# Patient Record
Sex: FEMALE | Race: WHITE | NOT HISPANIC OR LATINO | Employment: FULL TIME | URBAN - METROPOLITAN AREA
[De-identification: names, ages, dates, MRNs, and addresses within clinical notes are randomized per-mention and may not be internally consistent; named-entity substitution may affect disease eponyms.]

---

## 2023-04-25 LAB
ESTRADIOL (PG/ML) IN SER/PLAS: 19 PG/ML
ESTRADIOL (PG/ML) IN SER/PLAS: NORMAL PG/ML
HEMATOCRIT (%) IN BLOOD BY AUTOMATED COUNT: NORMAL

## 2023-05-02 LAB
ESTRADIOL (PG/ML) IN SER/PLAS: 402 PG/ML
HEMATOCRIT (%) IN BLOOD BY AUTOMATED COUNT: 43.6 % (ref 36–46)

## 2023-05-04 LAB — ESTRADIOL (PG/ML) IN SER/PLAS: 1080 PG/ML

## 2023-05-06 LAB
ESTRADIOL (PG/ML) IN SER/PLAS: 1837 PG/ML
PROGESTERONE (NG/ML) IN SER/PLAS: 0.9 NG/ML

## 2023-05-07 LAB
ESTRADIOL (PG/ML) IN SER/PLAS: 2562 PG/ML
PROGESTERONE (NG/ML) IN SER/PLAS: 1.2 NG/ML

## 2023-05-08 LAB — CHORIOGONADOTROPIN (MIU/ML) IN SER/PLAS: 25 MIU/ML

## 2023-05-09 LAB
LUTEINIZING HORMONE (IU/ML) IN SER/PLAS: 57.9 IU/L
PROGESTERONE (NG/ML) IN SER/PLAS: 3.6 NG/ML
PROGESTERONE (NG/ML) IN SER/PLAS: 6.3 NG/ML

## 2023-06-02 ENCOUNTER — LAB (OUTPATIENT)
Dept: LAB | Facility: LAB | Age: 33
End: 2023-06-02
Payer: COMMERCIAL

## 2023-06-03 LAB — TOBACCO SCREEN, URINE: NEGATIVE

## 2023-07-05 LAB
ESTRADIOL (PG/ML) IN SER/PLAS: <19 PG/ML
HEMATOCRIT (%) IN BLOOD BY AUTOMATED COUNT: 38.7 % (ref 36–46)

## 2023-07-11 LAB — ESTRADIOL (PG/ML) IN SER/PLAS: 322 PG/ML

## 2023-07-13 LAB — ESTRADIOL (PG/ML) IN SER/PLAS: 789 PG/ML

## 2023-07-15 LAB
ESTRADIOL (PG/ML) IN SER/PLAS: 1402 PG/ML
PROGESTERONE (NG/ML) IN SER/PLAS: 0.9 NG/ML

## 2023-07-16 LAB
ESTRADIOL (PG/ML) IN SER/PLAS: 2312 PG/ML
PROGESTERONE (NG/ML) IN SER/PLAS: 1 NG/ML

## 2023-07-17 LAB
CHORIOGONADOTROPIN (MIU/ML) IN SER/PLAS: 46 MIU/ML
LUTEINIZING HORMONE (IU/ML) IN SER/PLAS: 46.2 IU/L
PROGESTERONE (NG/ML) IN SER/PLAS: 4.8 NG/ML

## 2023-08-24 LAB
ESTRADIOL (PG/ML) IN SER/PLAS: 121 PG/ML
PROGESTERONE (NG/ML) IN SER/PLAS: 0.5 NG/ML

## 2023-09-01 LAB — PROGESTERONE (NG/ML) IN SER/PLAS: 54.5 NG/ML

## 2023-09-11 LAB — CHORIOGONADOTROPIN (MIU/ML) IN SER/PLAS: 261 MIU/ML

## 2023-09-18 LAB — CHORIOGONADOTROPIN (MIU/ML) IN SER/PLAS: 3056 MIU/ML

## 2023-09-26 LAB
CHORIOGONADOTROPIN (MIU/ML) IN SER/PLAS: ABNORMAL MIU/ML
PROGESTERONE (NG/ML) IN SER/PLAS: 54.8 NG/ML

## 2023-10-06 ENCOUNTER — TELEPHONE (OUTPATIENT)
Dept: ENDOCRINOLOGY | Facility: CLINIC | Age: 33
End: 2023-10-06
Payer: COMMERCIAL

## 2023-10-06 NOTE — TELEPHONE ENCOUNTER
Returned patient's phone call. Patient aware to continue baby ASA 81mg daily and once she sees OB, they will manage from there. No further questions at this time.    MAYLIN SIDDIQUI on 10/6/23 at 1:05 PM.

## 2023-10-11 ENCOUNTER — TELEPHONE (OUTPATIENT)
Dept: MATERNAL FETAL MEDICINE | Facility: CLINIC | Age: 33
End: 2023-10-11
Payer: COMMERCIAL

## 2023-10-17 ENCOUNTER — PHARMACY VISIT (OUTPATIENT)
Dept: PHARMACY | Facility: CLINIC | Age: 33
End: 2023-10-17
Payer: COMMERCIAL

## 2023-10-17 ENCOUNTER — SPECIALTY PHARMACY (OUTPATIENT)
Dept: PHARMACY | Facility: CLINIC | Age: 33
End: 2023-10-17

## 2023-10-17 PROCEDURE — RXMED WILLOW AMBULATORY MEDICATION CHARGE

## 2023-10-19 ENCOUNTER — PHARMACY VISIT (OUTPATIENT)
Dept: PHARMACY | Facility: CLINIC | Age: 33
End: 2023-10-19
Payer: COMMERCIAL

## 2023-10-19 ENCOUNTER — SPECIALTY PHARMACY (OUTPATIENT)
Dept: PHARMACY | Facility: CLINIC | Age: 33
End: 2023-10-19

## 2023-10-19 PROBLEM — L41.0 PLEVA (PITYRIASIS LICHENOIDES ET VARIOLIFORMIS ACUTA): Status: ACTIVE | Noted: 2023-10-19

## 2023-10-19 PROBLEM — N97.9 FEMALE INFERTILITY: Status: ACTIVE | Noted: 2023-10-19

## 2023-10-19 PROBLEM — Q95.0 BALANCED AUTOSOMAL TRANSLOCATION IN NORMAL INDIVIDUAL: Status: ACTIVE | Noted: 2023-10-19

## 2023-10-19 PROCEDURE — RXMED WILLOW AMBULATORY MEDICATION CHARGE

## 2023-10-19 RX ORDER — PNV NO.95/FERROUS FUM/FOLIC AC 28MG-0.8MG
TABLET ORAL
COMMUNITY
End: 2024-04-10 | Stop reason: ALTCHOICE

## 2023-10-19 RX ORDER — NORGESTIMATE AND ETHINYL ESTRADIOL 0.25-0.035
1 KIT ORAL DAILY
COMMUNITY
End: 2024-01-16 | Stop reason: ALTCHOICE

## 2023-10-19 RX ORDER — NEEDLES, DISPOSABLE 25GX5/8"
NEEDLE, DISPOSABLE MISCELLANEOUS
COMMUNITY
End: 2024-01-16 | Stop reason: ALTCHOICE

## 2023-10-19 RX ORDER — SPIRONOLACTONE 50 MG/1
1 TABLET, FILM COATED ORAL DAILY
COMMUNITY
Start: 2018-09-11 | End: 2024-01-16 | Stop reason: ALTCHOICE

## 2023-10-19 RX ORDER — CLOMIPHENE CITRATE 50 MG/1
1 TABLET ORAL DAILY
COMMUNITY
Start: 2022-02-25 | End: 2023-10-20

## 2023-10-19 RX ORDER — ESTRADIOL 2 MG/1
3 TABLET ORAL DAILY
COMMUNITY
Start: 2023-10-04 | End: 2024-01-16 | Stop reason: ALTCHOICE

## 2023-10-20 ENCOUNTER — INITIAL PRENATAL (OUTPATIENT)
Dept: OBSTETRICS AND GYNECOLOGY | Facility: CLINIC | Age: 33
End: 2023-10-20
Payer: COMMERCIAL

## 2023-10-20 VITALS — WEIGHT: 146 LBS | DIASTOLIC BLOOD PRESSURE: 62 MMHG | SYSTOLIC BLOOD PRESSURE: 136 MMHG | BODY MASS INDEX: 22.89 KG/M2

## 2023-10-20 DIAGNOSIS — Z34.01 ENCOUNTER FOR PRENATAL CARE OF FIRST PREGNANCY, FIRST TRIMESTER (HHS-HCC): Primary | ICD-10-CM

## 2023-10-20 DIAGNOSIS — Z34.01 ENCOUNTER FOR SUPERVISION OF NORMAL FIRST PREGNANCY IN FIRST TRIMESTER (HHS-HCC): ICD-10-CM

## 2023-10-20 DIAGNOSIS — Z31.83 IN VITRO FERTILIZATION: ICD-10-CM

## 2023-10-20 PROBLEM — L41.0 PLEVA (PITYRIASIS LICHENOIDES ET VARIOLIFORMIS ACUTA): Status: RESOLVED | Noted: 2023-10-19 | Resolved: 2023-10-20

## 2023-10-20 PROCEDURE — 87086 URINE CULTURE/COLONY COUNT: CPT

## 2023-10-20 PROCEDURE — 90471 IMMUNIZATION ADMIN: CPT | Performed by: OBSTETRICS & GYNECOLOGY

## 2023-10-20 PROCEDURE — 90686 IIV4 VACC NO PRSV 0.5 ML IM: CPT | Performed by: OBSTETRICS & GYNECOLOGY

## 2023-10-20 PROCEDURE — 0500F INITIAL PRENATAL CARE VISIT: CPT | Performed by: OBSTETRICS & GYNECOLOGY

## 2023-10-20 NOTE — PROGRESS NOTES
Subjective   Patient ID [id number]   Emelyn Perez is a 32 y.o.  at 9w5d with a working estimated date of delivery of 2024, by Ultrasound who presents for an initial prenatal visit. This pregnancy is planned and conceived via IVF for a balanced translocation.     Pob: three very early ?chemical pregnancies at 4 wks  PMH: none  PSH: none  Meds: estrogen/progesterone (stop at 11 wks), prenatal vitamin, Vit D and ASA 81 mg     Her pregnancy is complicated by:  none    OB History    Para Term  AB Living   1             SAB IAB Ectopic Multiple Live Births                  # Outcome Date GA Lbr Chevy/2nd Weight Sex Delivery Anes PTL Lv   1 Current                   Objective   Physical Exam  Weight: 66.2 kg (146 lb)  Expected Total Weight Gain: Could not be calculated   Pregravid BMI: Could not be calculated  BP: 136/62         Physical Exam  Constitutional:       Appearance: Normal appearance. She is normal weight.   Abdominal:      Comments: US: single intrauterine pregnancy with +FCA c/w prior scans   Neurological:      Mental Status: She is alert.         Assessment/Plan   33 y/o  at 9.5 wks by IVF presenting for new ob visit.    Routine prenatal care: plan OB labs n/v, not a candidate for ASA, will stop her ASA 81 mg now.  Immunizations: flu shot given today, COVID booster recommended  Genetics: embyro PGT tested, discussed if she would like genetic screening, NIPS is still recommended.   had negative carrier screening, patient herself did not have carrier screening  IVF pregnancy: discussed serial growth, NSTs at 36 wks and delivery by 39.6 wks.    Follow up in 4 weeks for return OB visit.

## 2023-10-22 LAB — BACTERIA UR CULT: NO GROWTH

## 2023-10-27 ENCOUNTER — LAB (OUTPATIENT)
Dept: LAB | Facility: LAB | Age: 33
End: 2023-10-27
Payer: COMMERCIAL

## 2023-10-27 DIAGNOSIS — Z34.01 ENCOUNTER FOR PRENATAL CARE OF FIRST PREGNANCY, FIRST TRIMESTER (HHS-HCC): ICD-10-CM

## 2023-10-27 LAB
ABO GROUP (TYPE) IN BLOOD: NORMAL
ANTIBODY SCREEN: NORMAL
ERYTHROCYTE [DISTWIDTH] IN BLOOD BY AUTOMATED COUNT: 12.7 % (ref 11.5–14.5)
HBV SURFACE AG SERPL QL IA: NONREACTIVE
HCT VFR BLD AUTO: 38.7 % (ref 36–46)
HCV AB SER QL: NONREACTIVE
HGB BLD-MCNC: 12.7 G/DL (ref 12–16)
HIV 1+2 AB+HIV1 P24 AG SERPL QL IA: NONREACTIVE
MCH RBC QN AUTO: 29.2 PG (ref 26–34)
MCHC RBC AUTO-ENTMCNC: 32.8 G/DL (ref 32–36)
MCV RBC AUTO: 89 FL (ref 80–100)
NRBC BLD-RTO: 0 /100 WBCS (ref 0–0)
PLATELET # BLD AUTO: 344 X10*3/UL (ref 150–450)
PMV BLD AUTO: 10.9 FL (ref 7.5–11.5)
RBC # BLD AUTO: 4.35 X10*6/UL (ref 4–5.2)
RH FACTOR (ANTIGEN D): NORMAL
WBC # BLD AUTO: 15.4 X10*3/UL (ref 4.4–11.3)

## 2023-10-27 PROCEDURE — 36415 COLL VENOUS BLD VENIPUNCTURE: CPT

## 2023-10-27 PROCEDURE — 86900 BLOOD TYPING SEROLOGIC ABO: CPT

## 2023-10-27 PROCEDURE — 87340 HEPATITIS B SURFACE AG IA: CPT

## 2023-10-27 PROCEDURE — 85027 COMPLETE CBC AUTOMATED: CPT

## 2023-10-27 PROCEDURE — 86780 TREPONEMA PALLIDUM: CPT

## 2023-10-27 PROCEDURE — 86901 BLOOD TYPING SEROLOGIC RH(D): CPT

## 2023-10-27 PROCEDURE — 86850 RBC ANTIBODY SCREEN: CPT

## 2023-10-27 PROCEDURE — 86317 IMMUNOASSAY INFECTIOUS AGENT: CPT

## 2023-10-27 PROCEDURE — 86803 HEPATITIS C AB TEST: CPT

## 2023-10-27 PROCEDURE — 87389 HIV-1 AG W/HIV-1&-2 AB AG IA: CPT

## 2023-10-28 LAB
RUBV IGG SERPL IA-ACNC: 3 IA
RUBV IGG SERPL QL IA: POSITIVE
T PALLIDUM AB SER QL: NONREACTIVE

## 2023-11-03 ENCOUNTER — TELEPHONE (OUTPATIENT)
Dept: OBSTETRICS AND GYNECOLOGY | Facility: CLINIC | Age: 33
End: 2023-11-03

## 2023-11-03 DIAGNOSIS — O21.9 NAUSEA AND VOMITING IN PREGNANCY PRIOR TO 22 WEEKS GESTATION (HHS-HCC): Primary | ICD-10-CM

## 2023-11-03 DIAGNOSIS — N97.9 FEMALE INFERTILITY, UNSPECIFIED: ICD-10-CM

## 2023-11-03 RX ORDER — ONDANSETRON 4 MG/1
4 TABLET, FILM COATED ORAL EVERY 12 HOURS PRN
Qty: 14 TABLET | Refills: 0 | Status: SHIPPED | OUTPATIENT
Start: 2023-11-03 | End: 2023-11-10

## 2023-11-03 RX ORDER — ESTRADIOL 2 MG/1
6 TABLET ORAL DAILY
Qty: 90 TABLET | Refills: 2 | OUTPATIENT
Start: 2023-11-03

## 2023-11-03 NOTE — TELEPHONE ENCOUNTER
Anjel Mandujano, can you please check that this patient needs this script. She has been seen for OB care and should have stopped her Estrace at the end of October, TYShanna :)

## 2023-11-03 NOTE — TELEPHONE ENCOUNTER
Called patient to make sure she stopped taking her estrace last week, as Shanna Warner received Rx request for it. Patient confirmed she stopped taking her estrace at 10w6d, and did not put in the request.    MAYLIN SIDDIQUI on 11/3/23 at 9:11 AM.

## 2023-11-03 NOTE — TELEPHONE ENCOUNTER
11.5 wk ob called OB line c/o horrible nausea, which has recently started.  Patients coworkers, who are also pregnant, advised her to call to see if she can have a prescription for Zofran.  Patient also has a headache today.  Patient has not had any vomiting yet but her nausea is becoming very bothersome.    Patient advised ok to take tylenol in pregnancy, per box instructions for her headache.  Patient advised to try over the counter options below.    *Ginger capsules up to 4 times a day  *Unisom 1 tab/Vitamin B6 1 tab at bedtime, 1 vitamin B6 in the morning and 1 vitamin B6 in the afternoon  * Prego pops, ginger snaps, ginger candies, ginger tea, lemon drop candies, lemon water, pregnancy bands  *Switching to a gummy prenatal vitamin for the next few weeks may help with nausea  *Eating small meals throughout the day can help as well.    Patient encouraged to try to push fluids to prevent dehydration.  Try to drink at least 2-3 liters of fluids a day.  Propel, Gatorade, PowerAde, Pedialyte, Liquid IV, ginger ale or even sprite.  Eating bland foods or broth for the 1st few weeks may help until her nausea can get under control.  Try the over the counter options for at least 4-5 days and call if not helping.      Patient was assured that I will send in a message to the provider to see if something can be sent to your pharmacy as well.      Please advise on Zofran request

## 2023-11-14 ENCOUNTER — APPOINTMENT (OUTPATIENT)
Dept: RADIOLOGY | Facility: CLINIC | Age: 33
End: 2023-11-14
Payer: COMMERCIAL

## 2023-11-16 ENCOUNTER — ANCILLARY PROCEDURE (OUTPATIENT)
Dept: RADIOLOGY | Facility: CLINIC | Age: 33
End: 2023-11-16
Payer: COMMERCIAL

## 2023-11-16 DIAGNOSIS — Z34.01 ENCOUNTER FOR PRENATAL CARE OF FIRST PREGNANCY, FIRST TRIMESTER (HHS-HCC): ICD-10-CM

## 2023-11-16 PROCEDURE — 76801 OB US < 14 WKS SINGLE FETUS: CPT | Performed by: OBSTETRICS & GYNECOLOGY

## 2023-11-16 PROCEDURE — 76801 OB US < 14 WKS SINGLE FETUS: CPT

## 2023-11-17 ENCOUNTER — ROUTINE PRENATAL (OUTPATIENT)
Dept: OBSTETRICS AND GYNECOLOGY | Facility: CLINIC | Age: 33
End: 2023-11-17
Payer: COMMERCIAL

## 2023-11-17 VITALS — DIASTOLIC BLOOD PRESSURE: 80 MMHG | BODY MASS INDEX: 23.91 KG/M2 | WEIGHT: 152.5 LBS | SYSTOLIC BLOOD PRESSURE: 126 MMHG

## 2023-11-17 DIAGNOSIS — Z34.01 ENCOUNTER FOR SUPERVISION OF NORMAL FIRST PREGNANCY IN FIRST TRIMESTER (HHS-HCC): ICD-10-CM

## 2023-11-17 DIAGNOSIS — Z3A.13 13 WEEKS GESTATION OF PREGNANCY (HHS-HCC): Primary | ICD-10-CM

## 2023-11-17 PROCEDURE — 88175 CYTOPATH C/V AUTO FLUID REDO: CPT

## 2023-11-17 PROCEDURE — 88141 CYTOPATH C/V INTERPRET: CPT | Performed by: PATHOLOGY

## 2023-11-17 PROCEDURE — 87800 DETECT AGNT MULT DNA DIREC: CPT

## 2023-11-17 PROCEDURE — 87624 HPV HI-RISK TYP POOLED RSLT: CPT

## 2023-11-17 PROCEDURE — 0501F PRENATAL FLOW SHEET: CPT | Performed by: OBSTETRICS & GYNECOLOGY

## 2023-11-17 NOTE — PROGRESS NOTES
Routine ob at 13.5 wks.  Feeling okay, having some nausea but manageable.  Unable to measure NT but did not appear abnormal.  Normal ob labs.  Declining NIPS.  Normal physical exam today, pap and gc/ct done.  RTC 4 wks.

## 2023-11-18 LAB
C TRACH RRNA SPEC QL NAA+PROBE: NEGATIVE
N GONORRHOEA DNA SPEC QL PROBE+SIG AMP: NEGATIVE

## 2023-12-08 ENCOUNTER — TELEPHONE (OUTPATIENT)
Dept: OBSTETRICS AND GYNECOLOGY | Facility: CLINIC | Age: 33
End: 2023-12-08
Payer: COMMERCIAL

## 2023-12-08 LAB

## 2023-12-12 NOTE — TELEPHONE ENCOUNTER
----- Message from Radha Abad MD sent at 12/8/2023  5:29 PM EST -----  Pap asc-us, hpv pos, needs colposcopy    ----- Message -----  From: Lab, Background User  Sent: 11/18/2023   1:16 PM EST  To: Radha Abad MD

## 2023-12-18 ENCOUNTER — ROUTINE PRENATAL (OUTPATIENT)
Dept: OBSTETRICS AND GYNECOLOGY | Facility: CLINIC | Age: 33
End: 2023-12-18
Payer: COMMERCIAL

## 2023-12-18 VITALS — BODY MASS INDEX: 24.93 KG/M2 | WEIGHT: 159 LBS | SYSTOLIC BLOOD PRESSURE: 122 MMHG | DIASTOLIC BLOOD PRESSURE: 82 MMHG

## 2023-12-18 DIAGNOSIS — R87.620 ATYPICAL SQUAMOUS CELL CHANGES OF UNDETERMINED SIGNIFICANCE (ASCUS) ON VAGINAL CYTOLOGY WITH POSITIVE HIGH RISK HUMAN PAPILLOMA VIRUS (HPV): ICD-10-CM

## 2023-12-18 DIAGNOSIS — Z3A.18 18 WEEKS GESTATION OF PREGNANCY (HHS-HCC): Primary | ICD-10-CM

## 2023-12-18 DIAGNOSIS — R87.811 ATYPICAL SQUAMOUS CELL CHANGES OF UNDETERMINED SIGNIFICANCE (ASCUS) ON VAGINAL CYTOLOGY WITH POSITIVE HIGH RISK HUMAN PAPILLOMA VIRUS (HPV): ICD-10-CM

## 2023-12-18 PROCEDURE — 0501F PRENATAL FLOW SHEET: CPT | Performed by: OBSTETRICS & GYNECOLOGY

## 2023-12-18 PROCEDURE — 57452 EXAM OF CERVIX W/SCOPE: CPT | Performed by: OBSTETRICS & GYNECOLOGY

## 2023-12-18 NOTE — PROGRESS NOTES
Routine ob at 18.1 wks.  Feeling well.  Ob colpo today for ASC-US, hpv pos (other) pap.  Has anatomy scan scheduled.  RTC 4 wks.

## 2023-12-18 NOTE — PROGRESS NOTES
Patient ID: Emelyn Perez is a 33 y.o. female.    Colposcopy    Date/Time: 12/18/2023 12:44 PM    Performed by: Radha Abad MD  Authorized by: Radha Abad MD    Consent:     Patient questions answered: yes      Risks and benefits of the procedure and its alternatives discussed: yes      Consent obtained:  Written  Procedure:     Colposcopy with: colposcopy only      Cervix visibility: fully visualized      SCJ visibility: fully visualized      Lesion visualized: fully visualized      Acetowhite lesion(s): cervix    Post-procedure:     Patient tolerance of procedure:  Patient tolerated the procedure well with no immediate complications  Comments:      Ob colpo for ASC-US, hpv positive (other) pap.  Impression CIN1.  No biopsies taken.  Plan postpartum repeat colpo.    Radha Abad MD

## 2023-12-26 ENCOUNTER — ANCILLARY PROCEDURE (OUTPATIENT)
Dept: RADIOLOGY | Facility: CLINIC | Age: 33
End: 2023-12-26
Payer: COMMERCIAL

## 2023-12-26 DIAGNOSIS — Z34.90 ENCOUNTER FOR SUPERVISION OF NORMAL PREGNANCY, UNSPECIFIED, UNSPECIFIED TRIMESTER (HHS-HCC): ICD-10-CM

## 2023-12-26 DIAGNOSIS — O09.819 HIGH RISK PREGNANCY DUE TO ASSISTED REPRODUCTIVE TECHNOLOGY (HHS-HCC): ICD-10-CM

## 2023-12-26 PROCEDURE — 76817 TRANSVAGINAL US OBSTETRIC: CPT | Performed by: OBSTETRICS & GYNECOLOGY

## 2023-12-26 PROCEDURE — 76811 OB US DETAILED SNGL FETUS: CPT | Performed by: OBSTETRICS & GYNECOLOGY

## 2023-12-26 PROCEDURE — 76820 UMBILICAL ARTERY ECHO: CPT | Performed by: OBSTETRICS & GYNECOLOGY

## 2023-12-26 PROCEDURE — 76817 TRANSVAGINAL US OBSTETRIC: CPT

## 2023-12-26 PROCEDURE — 76811 OB US DETAILED SNGL FETUS: CPT

## 2024-01-02 ENCOUNTER — TELEPHONE (OUTPATIENT)
Dept: OBSTETRICS AND GYNECOLOGY | Facility: CLINIC | Age: 34
End: 2024-01-02

## 2024-01-02 NOTE — TELEPHONE ENCOUNTER
20.2 wk ob left message on ob line c/o horrible sinus pressure and is wondering what she can take for relief.    Left message for patient to call back to discuss symptoms.    Following message sent through [employer]:     Anjel Dunaway,    Sorry to hear your not feeling well.  Attached is our list of safe over the counter medication that can be taken in pregnancy to refer to.    You can take the following based of your symptoms:        Mucines- for congestion; per box instructions  Sudafed or pseudoephedrine- for sinus pressure/headache if you do not have issues with high blood pressure; per box instructions.  If you do have high blood pressure, then should avoid Sudafed or pseudoephedrine.  You can try Afrin nasal spray, per box instructions; Actifed or Claritin D, per box instructions.    Tylenol- for headache, body aches, chills; per box instructions.     Make sure you are staying well hydrated, drinking at least 2-3 liters of water a day and getting plenty of rest.  If you start feeling worse, than she would need to get in with either PCP or Urgent Care for evaluation.  If needed, antibiotics are safe in pregnancy      Please call with any other questions or concerns.    -Ludmila  OB navigator

## 2024-01-12 ENCOUNTER — ANCILLARY PROCEDURE (OUTPATIENT)
Dept: RADIOLOGY | Facility: CLINIC | Age: 34
End: 2024-01-12
Payer: COMMERCIAL

## 2024-01-12 DIAGNOSIS — Z34.90 ENCOUNTER FOR SUPERVISION OF NORMAL PREGNANCY, UNSPECIFIED, UNSPECIFIED TRIMESTER (HHS-HCC): ICD-10-CM

## 2024-01-12 PROCEDURE — 76816 OB US FOLLOW-UP PER FETUS: CPT

## 2024-01-12 PROCEDURE — 76816 OB US FOLLOW-UP PER FETUS: CPT | Performed by: OBSTETRICS & GYNECOLOGY

## 2024-01-16 ENCOUNTER — ROUTINE PRENATAL (OUTPATIENT)
Dept: OBSTETRICS AND GYNECOLOGY | Facility: CLINIC | Age: 34
End: 2024-01-16
Payer: COMMERCIAL

## 2024-01-16 VITALS — BODY MASS INDEX: 26.65 KG/M2 | WEIGHT: 170 LBS | SYSTOLIC BLOOD PRESSURE: 124 MMHG | DIASTOLIC BLOOD PRESSURE: 84 MMHG

## 2024-01-16 DIAGNOSIS — Z3A.22 22 WEEKS GESTATION OF PREGNANCY (HHS-HCC): Primary | ICD-10-CM

## 2024-01-16 DIAGNOSIS — Z34.01 ENCOUNTER FOR SUPERVISION OF NORMAL FIRST PREGNANCY IN FIRST TRIMESTER (HHS-HCC): ICD-10-CM

## 2024-01-16 PROBLEM — O43.199 MARGINAL INSERTION OF UMBILICAL CORD AFFECTING MANAGEMENT OF MOTHER (HHS-HCC): Status: ACTIVE | Noted: 2024-01-16

## 2024-01-16 PROCEDURE — 59425 ANTEPARTUM CARE ONLY: CPT | Performed by: OBSTETRICS & GYNECOLOGY

## 2024-01-16 NOTE — PROGRESS NOTES
Routine ob at 22.2 wks.  Feeling well.  Not yet feeling fetal movement.  Anatomy scan sig for marginal cord insertion and EFW 10% at anatomy scan.  Had follow up scan on Friday but report is pending, has next scan at 28 wks.  RTC 4 wks, 1 hour then.

## 2024-01-26 ENCOUNTER — TELEPHONE (OUTPATIENT)
Dept: OBSTETRICS AND GYNECOLOGY | Facility: CLINIC | Age: 34
End: 2024-01-26
Payer: COMMERCIAL

## 2024-01-26 ENCOUNTER — HOSPITAL ENCOUNTER (INPATIENT)
Facility: HOSPITAL | Age: 34
LOS: 26 days | Discharge: HOME | End: 2024-02-21
Attending: OBSTETRICS & GYNECOLOGY | Admitting: OBSTETRICS & GYNECOLOGY
Payer: COMMERCIAL

## 2024-01-26 DIAGNOSIS — Z31.83 IN VITRO FERTILIZATION: ICD-10-CM

## 2024-01-26 DIAGNOSIS — O16.9: ICD-10-CM

## 2024-01-26 DIAGNOSIS — O36.5921 POOR FETAL GROWTH AFFECTING MANAGEMENT OF MOTHER IN SECOND TRIMESTER, FETUS 1 OF MULTIPLE GESTATION (HHS-HCC): Primary | ICD-10-CM

## 2024-01-26 DIAGNOSIS — Q95.0 BALANCED AUTOSOMAL TRANSLOCATION IN NORMAL INDIVIDUAL: ICD-10-CM

## 2024-01-26 DIAGNOSIS — O36.5920: ICD-10-CM

## 2024-01-26 DIAGNOSIS — O14.12: ICD-10-CM

## 2024-01-26 DIAGNOSIS — O43.199 MARGINAL INSERTION OF UMBILICAL CORD AFFECTING MANAGEMENT OF MOTHER (HHS-HCC): ICD-10-CM

## 2024-01-26 LAB
ALBUMIN SERPL BCP-MCNC: 3.6 G/DL (ref 3.4–5)
ALBUMIN SERPL BCP-MCNC: 3.9 G/DL (ref 3.4–5)
ALP SERPL-CCNC: 43 U/L (ref 33–110)
ALP SERPL-CCNC: 49 U/L (ref 33–110)
ALT SERPL W P-5'-P-CCNC: 22 U/L (ref 7–45)
ALT SERPL W P-5'-P-CCNC: 23 U/L (ref 7–45)
ANION GAP SERPL CALC-SCNC: 12 MMOL/L (ref 10–20)
ANION GAP SERPL CALC-SCNC: 16 MMOL/L (ref 10–20)
AST SERPL W P-5'-P-CCNC: 20 U/L (ref 9–39)
AST SERPL W P-5'-P-CCNC: 21 U/L (ref 9–39)
BILIRUB SERPL-MCNC: 0.3 MG/DL (ref 0–1.2)
BILIRUB SERPL-MCNC: 0.3 MG/DL (ref 0–1.2)
BUN SERPL-MCNC: 14 MG/DL (ref 6–23)
BUN SERPL-MCNC: 15 MG/DL (ref 6–23)
CALCIUM SERPL-MCNC: 8 MG/DL (ref 8.6–10.6)
CALCIUM SERPL-MCNC: 9 MG/DL (ref 8.6–10.3)
CHLORIDE SERPL-SCNC: 102 MMOL/L (ref 98–107)
CHLORIDE SERPL-SCNC: 107 MMOL/L (ref 98–107)
CO2 SERPL-SCNC: 19 MMOL/L (ref 21–32)
CO2 SERPL-SCNC: 22 MMOL/L (ref 21–32)
CREAT SERPL-MCNC: 0.55 MG/DL (ref 0.5–1.05)
CREAT SERPL-MCNC: 0.59 MG/DL (ref 0.5–1.05)
CREAT UR-MCNC: 12.6 MG/DL (ref 20–320)
EGFRCR SERPLBLD CKD-EPI 2021: >90 ML/MIN/1.73M*2
EGFRCR SERPLBLD CKD-EPI 2021: >90 ML/MIN/1.73M*2
ERYTHROCYTE [DISTWIDTH] IN BLOOD BY AUTOMATED COUNT: 13.4 % (ref 11.5–14.5)
ERYTHROCYTE [DISTWIDTH] IN BLOOD BY AUTOMATED COUNT: 13.4 % (ref 11.5–14.5)
GLUCOSE SERPL-MCNC: 120 MG/DL (ref 74–99)
GLUCOSE SERPL-MCNC: 98 MG/DL (ref 74–99)
HCT VFR BLD AUTO: 38.3 % (ref 36–46)
HCT VFR BLD AUTO: 38.7 % (ref 36–46)
HGB BLD-MCNC: 12.7 G/DL (ref 12–16)
HGB BLD-MCNC: 12.9 G/DL (ref 12–16)
HOLD SPECIMEN: NORMAL
HOLD SPECIMEN: NORMAL
MCH RBC QN AUTO: 29.3 PG (ref 26–34)
MCH RBC QN AUTO: 29.4 PG (ref 26–34)
MCHC RBC AUTO-ENTMCNC: 33.2 G/DL (ref 32–36)
MCHC RBC AUTO-ENTMCNC: 33.3 G/DL (ref 32–36)
MCV RBC AUTO: 88 FL (ref 80–100)
MCV RBC AUTO: 88 FL (ref 80–100)
NRBC BLD-RTO: 0 /100 WBCS (ref 0–0)
NRBC BLD-RTO: 0.1 /100 WBCS (ref 0–0)
PLATELET # BLD AUTO: 255 X10*3/UL (ref 150–450)
PLATELET # BLD AUTO: 266 X10*3/UL (ref 150–450)
POTASSIUM SERPL-SCNC: 3.8 MMOL/L (ref 3.5–5.3)
POTASSIUM SERPL-SCNC: 4 MMOL/L (ref 3.5–5.3)
PROT SERPL-MCNC: 6.2 G/DL (ref 6.4–8.2)
PROT SERPL-MCNC: 6.3 G/DL (ref 6.4–8.2)
PROT UR-ACNC: 18 MG/DL (ref 5–24)
PROT/CREAT UR: 1.43 MG/MG CREAT (ref 0–0.17)
RBC # BLD AUTO: 4.34 X10*6/UL (ref 4–5.2)
RBC # BLD AUTO: 4.39 X10*6/UL (ref 4–5.2)
SODIUM SERPL-SCNC: 133 MMOL/L (ref 136–145)
SODIUM SERPL-SCNC: 137 MMOL/L (ref 136–145)
TREPONEMA PALLIDUM IGG+IGM AB [PRESENCE] IN SERUM OR PLASMA BY IMMUNOASSAY: NONREACTIVE
URATE SERPL-MCNC: 3.8 MG/DL (ref 2.3–6.7)
WBC # BLD AUTO: 16.1 X10*3/UL (ref 4.4–11.3)
WBC # BLD AUTO: 19.6 X10*3/UL (ref 4.4–11.3)

## 2024-01-26 PROCEDURE — 36415 COLL VENOUS BLD VENIPUNCTURE: CPT | Performed by: OBSTETRICS & GYNECOLOGY

## 2024-01-26 PROCEDURE — 51702 INSERT TEMP BLADDER CATH: CPT

## 2024-01-26 PROCEDURE — 85027 COMPLETE CBC AUTOMATED: CPT

## 2024-01-26 PROCEDURE — 84075 ASSAY ALKALINE PHOSPHATASE: CPT

## 2024-01-26 PROCEDURE — 36415 COLL VENOUS BLD VENIPUNCTURE: CPT

## 2024-01-26 PROCEDURE — 2500000004 HC RX 250 GENERAL PHARMACY W/ HCPCS (ALT 636 FOR OP/ED)

## 2024-01-26 PROCEDURE — 84075 ASSAY ALKALINE PHOSPHATASE: CPT | Performed by: OBSTETRICS & GYNECOLOGY

## 2024-01-26 PROCEDURE — 2500000004 HC RX 250 GENERAL PHARMACY W/ HCPCS (ALT 636 FOR OP/ED): Performed by: OBSTETRICS & GYNECOLOGY

## 2024-01-26 PROCEDURE — 85027 COMPLETE CBC AUTOMATED: CPT | Performed by: OBSTETRICS & GYNECOLOGY

## 2024-01-26 PROCEDURE — 99223 1ST HOSP IP/OBS HIGH 75: CPT

## 2024-01-26 PROCEDURE — 2500000001 HC RX 250 WO HCPCS SELF ADMINISTERED DRUGS (ALT 637 FOR MEDICARE OP)

## 2024-01-26 PROCEDURE — 99199 UNLISTED SPECIAL SVC PX/RPRT: CPT

## 2024-01-26 PROCEDURE — 1220000001 HC OB SEMI-PRIVATE ROOM DAILY

## 2024-01-26 PROCEDURE — 82570 ASSAY OF URINE CREATININE: CPT | Performed by: OBSTETRICS & GYNECOLOGY

## 2024-01-26 PROCEDURE — 99215 OFFICE O/P EST HI 40 MIN: CPT | Mod: 25,27

## 2024-01-26 PROCEDURE — 84550 ASSAY OF BLOOD/URIC ACID: CPT | Performed by: OBSTETRICS & GYNECOLOGY

## 2024-01-26 PROCEDURE — 86780 TREPONEMA PALLIDUM: CPT

## 2024-01-26 RX ORDER — CALCIUM GLUCONATE 98 MG/ML
1 INJECTION, SOLUTION INTRAVENOUS ONCE AS NEEDED
Status: DISCONTINUED | OUTPATIENT
Start: 2024-01-26 | End: 2024-01-26

## 2024-01-26 RX ORDER — LABETALOL HYDROCHLORIDE 5 MG/ML
20 INJECTION, SOLUTION INTRAVENOUS ONCE
Status: DISCONTINUED | OUTPATIENT
Start: 2024-01-26 | End: 2024-01-27

## 2024-01-26 RX ORDER — NIFEDIPINE 30 MG/1
30 TABLET, FILM COATED, EXTENDED RELEASE ORAL ONCE
Status: COMPLETED | OUTPATIENT
Start: 2024-01-26 | End: 2024-01-26

## 2024-01-26 RX ORDER — HYDRALAZINE HYDROCHLORIDE 20 MG/ML
5 INJECTION INTRAMUSCULAR; INTRAVENOUS ONCE AS NEEDED
Status: DISCONTINUED | OUTPATIENT
Start: 2024-01-26 | End: 2024-01-27

## 2024-01-26 RX ORDER — ACETAMINOPHEN 325 MG/1
975 TABLET ORAL ONCE
Status: COMPLETED | OUTPATIENT
Start: 2024-01-26 | End: 2024-01-26

## 2024-01-26 RX ORDER — LABETALOL HYDROCHLORIDE 5 MG/ML
INJECTION, SOLUTION INTRAVENOUS
Status: COMPLETED
Start: 2024-01-26 | End: 2024-01-26

## 2024-01-26 RX ORDER — ONDANSETRON 4 MG/1
4 TABLET, FILM COATED ORAL EVERY 6 HOURS PRN
Status: DISCONTINUED | OUTPATIENT
Start: 2024-01-26 | End: 2024-01-27

## 2024-01-26 RX ORDER — NIFEDIPINE 10 MG/1
10 CAPSULE ORAL ONCE AS NEEDED
Status: DISCONTINUED | OUTPATIENT
Start: 2024-01-26 | End: 2024-01-27 | Stop reason: DRUGHIGH

## 2024-01-26 RX ORDER — CALCIUM GLUCONATE 98 MG/ML
1 INJECTION, SOLUTION INTRAVENOUS ONCE AS NEEDED
Status: DISCONTINUED | OUTPATIENT
Start: 2024-01-26 | End: 2024-01-29 | Stop reason: ALTCHOICE

## 2024-01-26 RX ORDER — NIFEDIPINE 30 MG/1
30 TABLET, FILM COATED, EXTENDED RELEASE ORAL
Status: COMPLETED | OUTPATIENT
Start: 2024-01-26 | End: 2024-01-26

## 2024-01-26 RX ORDER — METOCLOPRAMIDE 10 MG/1
10 TABLET ORAL ONCE
Status: COMPLETED | OUTPATIENT
Start: 2024-01-26 | End: 2024-01-26

## 2024-01-26 RX ORDER — LABETALOL HYDROCHLORIDE 5 MG/ML
20 INJECTION, SOLUTION INTRAVENOUS ONCE AS NEEDED
Status: COMPLETED | OUTPATIENT
Start: 2024-01-26 | End: 2024-01-26

## 2024-01-26 RX ORDER — LABETALOL HYDROCHLORIDE 5 MG/ML
40 INJECTION, SOLUTION INTRAVENOUS ONCE
Status: COMPLETED | OUTPATIENT
Start: 2024-01-26 | End: 2024-01-26

## 2024-01-26 RX ORDER — BETAMETHASONE SODIUM PHOSPHATE AND BETAMETHASONE ACETATE 3; 3 MG/ML; MG/ML
12 INJECTION, SUSPENSION INTRA-ARTICULAR; INTRALESIONAL; INTRAMUSCULAR; SOFT TISSUE ONCE
Status: COMPLETED | OUTPATIENT
Start: 2024-01-26 | End: 2024-01-26

## 2024-01-26 RX ORDER — MAGNESIUM SULFATE HEPTAHYDRATE 40 MG/ML
2 INJECTION, SOLUTION INTRAVENOUS CONTINUOUS
Status: DISCONTINUED | OUTPATIENT
Start: 2024-01-26 | End: 2024-01-29 | Stop reason: ALTCHOICE

## 2024-01-26 RX ORDER — MAGNESIUM SULFATE HEPTAHYDRATE 40 MG/ML
2 INJECTION, SOLUTION INTRAVENOUS CONTINUOUS
Status: DISCONTINUED | OUTPATIENT
Start: 2024-01-26 | End: 2024-01-26

## 2024-01-26 RX ORDER — ONDANSETRON HYDROCHLORIDE 2 MG/ML
4 INJECTION, SOLUTION INTRAVENOUS EVERY 6 HOURS PRN
Status: DISCONTINUED | OUTPATIENT
Start: 2024-01-26 | End: 2024-01-27

## 2024-01-26 RX ORDER — DIPHENHYDRAMINE HCL 25 MG
25 CAPSULE ORAL ONCE
Status: COMPLETED | OUTPATIENT
Start: 2024-01-26 | End: 2024-01-26

## 2024-01-26 RX ORDER — LIDOCAINE HYDROCHLORIDE 10 MG/ML
0.5 INJECTION INFILTRATION; PERINEURAL ONCE AS NEEDED
Status: DISCONTINUED | OUTPATIENT
Start: 2024-01-26 | End: 2024-01-27

## 2024-01-26 RX ORDER — SODIUM CHLORIDE, SODIUM LACTATE, POTASSIUM CHLORIDE, CALCIUM CHLORIDE 600; 310; 30; 20 MG/100ML; MG/100ML; MG/100ML; MG/100ML
75 INJECTION, SOLUTION INTRAVENOUS CONTINUOUS
Status: DISCONTINUED | OUTPATIENT
Start: 2024-01-26 | End: 2024-01-27

## 2024-01-26 RX ADMIN — ACETAMINOPHEN 975 MG: 325 TABLET ORAL at 23:46

## 2024-01-26 RX ADMIN — NIFEDIPINE 30 MG: 30 TABLET, FILM COATED, EXTENDED RELEASE ORAL at 15:26

## 2024-01-26 RX ADMIN — BETAMETHASONE SODIUM PHOSPHATE AND BETAMETHASONE ACETATE 12 MG: 3; 3 INJECTION, SUSPENSION INTRA-ARTICULAR; INTRALESIONAL; INTRAMUSCULAR at 14:45

## 2024-01-26 RX ADMIN — MAGNESIUM SULFATE IN WATER 2 G/HR: 20 INJECTION, SOLUTION INTRAVENOUS at 21:10

## 2024-01-26 RX ADMIN — LABETALOL HYDROCHLORIDE 40 MG: 5 INJECTION, SOLUTION INTRAVENOUS at 15:05

## 2024-01-26 RX ADMIN — DIPHENHYDRAMINE HYDROCHLORIDE 25 MG: 25 CAPSULE ORAL at 23:46

## 2024-01-26 RX ADMIN — SODIUM CHLORIDE, POTASSIUM CHLORIDE, SODIUM LACTATE AND CALCIUM CHLORIDE 75 ML/HR: 600; 310; 30; 20 INJECTION, SOLUTION INTRAVENOUS at 15:08

## 2024-01-26 RX ADMIN — LABETALOL HYDROCHLORIDE 20 MG: 5 INJECTION, SOLUTION INTRAVENOUS at 14:17

## 2024-01-26 RX ADMIN — NIFEDIPINE 30 MG: 30 TABLET, FILM COATED, EXTENDED RELEASE ORAL at 21:10

## 2024-01-26 RX ADMIN — MAGNESIUM SULFATE HEPTAHYDRATE 2 G/HR: 40 INJECTION, SOLUTION INTRAVENOUS at 14:29

## 2024-01-26 RX ADMIN — METOCLOPRAMIDE 10 MG: 10 TABLET ORAL at 23:46

## 2024-01-26 RX ADMIN — MAGNESIUM SULFATE HEPTAHYDRATE 2 G/HR: 40 INJECTION, SOLUTION INTRAVENOUS at 14:52

## 2024-01-26 RX ADMIN — LABETALOL HYDROCHLORIDE 40 MG: 5 INJECTION, SOLUTION INTRAVENOUS at 16:25

## 2024-01-26 ASSESSMENT — PAIN SCALES - GENERAL
PAINLEVEL_OUTOF10: 0 - NO PAIN
PAINLEVEL_OUTOF10: 0 - NO PAIN
PAINLEVEL_OUTOF10: 1
PAINLEVEL_OUTOF10: 0 - NO PAIN
PAINLEVEL_OUTOF10: 2
PAINLEVEL_OUTOF10: 0 - NO PAIN
PAINLEVEL_OUTOF10: 1
PAINLEVEL_OUTOF10: 0 - NO PAIN
PAINLEVEL_OUTOF10: 0 - NO PAIN

## 2024-01-26 ASSESSMENT — PAIN DESCRIPTION - DESCRIPTORS
DESCRIPTORS: ACHING

## 2024-01-26 NOTE — TELEPHONE ENCOUNTER
Pt called at 23-5 weeks, IVF pregnancy, stating she has had ankle swelling for about a week now. Woke up with morning with severe swelling with pitting edema. Put on compression stockings, no relief. 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 At work today she had her BP taken. First reading was 150/93 on auto cuff. Then 144/64 with manual, then again on auto 144/100. Denies any HA's or URQ pain. Next obfu with Rafi on 2/6/24. Please advise.

## 2024-01-26 NOTE — NURSING NOTE
"1340: pt vital signs severe range, provider notified new orders for blood work and an IV  1355: provider aware of BP  1410: provider aware of BP, new orders for 20mg labetalol IV, new orders to initiate mag sulfate IV  1417: 20 mg labetalol IV given  1428: provider aware of BP  1429: Mag sulfate 6mg bolus given, RN remains at bedside for continuous nursing evaluation  1430: New orders for handley catheter  1435: handley catheter inserted by RN   1445: Betamethasone given IM   1452\" Magnesium sulfate running at 2mg  1458: provider aware of BP, new orders for 40 mg of labetalol  1505: 40 mg of labetalol given  1526: Oral nifedipine given per provider order  1530: provider at bedside, ultrasound performed infant HR audible at 140's  1618: provider aware of BP, new orders for labetalol 40 mg  1625: 40 mg of labetalol given per provider order  1628: transport at bedside  1652: pt off unit with transport    "

## 2024-01-26 NOTE — H&P
Obstetrical Admission History and Physical     Emelyn Perez is a 33 y.o. . At 23w 5d with new onset of severe range HTN.    Chief Complaint: Hypertension   She had swollen ankles today. 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 Advised to come in to triage. Denies past hx HTN. IVF pregnancy. Has no HA , nausea or vision changes.   BP here 162/92, 166/98, 165/100 - Labetalol 20 IV given       Assessment/Plan    Labetalol 20 then  40 mg IV, added 40 mg IV w recurrence /98 not sustained.   Mag sulfate prophylaxis .  BMZ 1st dose given   Transfer to [facility] Dr Jazmín Duron accepts.   Pt aware and agree.       Pregnancy Problems (from 10/20/23 to present)    Preeclampsia w severe features     IVF pregnancy      Problem Noted Resolved    Marginal insertion of umbilical cord affecting management of mother 2024 by Radha Abad MD No    Priority:  Medium      Overview Signed 2024  2:58 PM by Radha Abad MD     -serial growth         Encounter for supervision of normal first pregnancy in first trimester 10/20/2023 by Radha Abad MD No    Priority:  Medium      Overview Addendum 2023  5:11 PM by Radha Abad MD     -s/p flu shot 10/20  -declines NIPS                    Obstetrical History   OB History    Para Term  AB Living   1             SAB IAB Ectopic Multiple Live Births                  # Outcome Date GA Lbr Chevy/2nd Weight Sex Delivery Anes PTL Lv   1 Current                Past Medical History  Past Medical History:   Diagnosis Date    Benedict product of in vitro fertilization (IVF) pregnancy     Personal history of other diseases of the respiratory system 2020    History of sinusitis        Past Surgical History   Past Surgical History:   Procedure Laterality Date    OTHER SURGICAL HISTORY  2020    No history of surgery       Social History  Social History     Tobacco Use    Smoking status: Never     Passive exposure: Never    Smokeless tobacco: Never    Substance Use Topics    Alcohol use: Never     Substance and Sexual Activity   Drug Use Never       Allergies  Patient has no known allergies.     Medications  PNV     Objective    Last Vitals  Temp Pulse Resp BP MAP O2 Sat     76   (!) 159/95   99 %     Physical Examination  GENERAL: Examination reveals a well developed, well nourished, gravid female in no acute distress. She is alert and cooperative.  ABDOMEN: soft, gravid, nontender, nondistended, no abnormal masses, no epigastric pain  FHR is  , with  , and a   tracing.    EXTREMITIES: no redness or tenderness in the calves or thighs, no edema  SKIN: normal coloration and turgor, no rashes  NEUROLOGICAL: DTRs normal and symmetrical  No ctxs.   FHR Cat 1 - 150s baseline w no decels     Lab Review  Lab Results   Component Value Date    WBC 16.1 (H) 01/26/2024    HGB 12.7 01/26/2024    HCT 38.3 01/26/2024     01/26/2024     Lab Results   Component Value Date    GLUCOSE 98 01/26/2024     01/26/2024    K 3.8 01/26/2024     01/26/2024    CO2 22 01/26/2024    ANIONGAP 12 01/26/2024    BUN 15 01/26/2024    CREATININE 0.59 01/26/2024    EGFR >90 01/26/2024    CALCIUM 9.0 01/26/2024    ALBUMIN 3.6 01/26/2024    PROT 6.2 (L) 01/26/2024    ALKPHOS 43 01/26/2024    ALT 22 01/26/2024    AST 21 01/26/2024    BILITOT 0.3 01/26/2024         Sade Green MD

## 2024-01-26 NOTE — TELEPHONE ENCOUNTER
Per Dr Perdomo and Dr Green (who conversed over the phone) recommendation is to visit L&D at [facility] for triage and lab workup. Pt notified and will be on her way. No further questions.

## 2024-01-27 LAB
ABO GROUP (TYPE) IN BLOOD: NORMAL
ANTIBODY SCREEN: NORMAL
GLUCOSE BLD MANUAL STRIP-MCNC: 103 MG/DL (ref 74–99)
GLUCOSE BLD MANUAL STRIP-MCNC: 114 MG/DL (ref 74–99)
GLUCOSE BLD MANUAL STRIP-MCNC: 125 MG/DL (ref 74–99)
RH FACTOR (ANTIGEN D): NORMAL

## 2024-01-27 PROCEDURE — 99232 SBSQ HOSP IP/OBS MODERATE 35: CPT | Performed by: STUDENT IN AN ORGANIZED HEALTH CARE EDUCATION/TRAINING PROGRAM

## 2024-01-27 PROCEDURE — 2500000004 HC RX 250 GENERAL PHARMACY W/ HCPCS (ALT 636 FOR OP/ED)

## 2024-01-27 PROCEDURE — 1210000001 HC SEMI-PRIVATE ROOM DAILY

## 2024-01-27 PROCEDURE — 59025 FETAL NON-STRESS TEST: CPT

## 2024-01-27 PROCEDURE — 36415 COLL VENOUS BLD VENIPUNCTURE: CPT | Performed by: STUDENT IN AN ORGANIZED HEALTH CARE EDUCATION/TRAINING PROGRAM

## 2024-01-27 PROCEDURE — 86850 RBC ANTIBODY SCREEN: CPT | Performed by: STUDENT IN AN ORGANIZED HEALTH CARE EDUCATION/TRAINING PROGRAM

## 2024-01-27 PROCEDURE — 99222 1ST HOSP IP/OBS MODERATE 55: CPT

## 2024-01-27 PROCEDURE — 59025 FETAL NON-STRESS TEST: CPT | Mod: GC

## 2024-01-27 PROCEDURE — 2500000001 HC RX 250 WO HCPCS SELF ADMINISTERED DRUGS (ALT 637 FOR MEDICARE OP)

## 2024-01-27 PROCEDURE — 82947 ASSAY GLUCOSE BLOOD QUANT: CPT

## 2024-01-27 PROCEDURE — 87081 CULTURE SCREEN ONLY: CPT

## 2024-01-27 PROCEDURE — 2500000004 HC RX 250 GENERAL PHARMACY W/ HCPCS (ALT 636 FOR OP/ED): Performed by: OBSTETRICS & GYNECOLOGY

## 2024-01-27 PROCEDURE — 99199 UNLISTED SPECIAL SVC PX/RPRT: CPT

## 2024-01-27 RX ORDER — OXYTOCIN 10 [USP'U]/ML
10 INJECTION, SOLUTION INTRAMUSCULAR; INTRAVENOUS ONCE AS NEEDED
Status: DISCONTINUED | OUTPATIENT
Start: 2024-01-27 | End: 2024-02-17

## 2024-01-27 RX ORDER — HYDRALAZINE HYDROCHLORIDE 20 MG/ML
5 INJECTION INTRAMUSCULAR; INTRAVENOUS ONCE AS NEEDED
Status: COMPLETED | OUTPATIENT
Start: 2024-01-27 | End: 2024-01-29

## 2024-01-27 RX ORDER — MISOPROSTOL 200 UG/1
800 TABLET ORAL ONCE AS NEEDED
Status: COMPLETED | OUTPATIENT
Start: 2024-01-27 | End: 2024-02-16

## 2024-01-27 RX ORDER — NIFEDIPINE 10 MG/1
10 CAPSULE ORAL ONCE AS NEEDED
Status: DISCONTINUED | OUTPATIENT
Start: 2024-01-27 | End: 2024-02-17

## 2024-01-27 RX ORDER — LOPERAMIDE HYDROCHLORIDE 2 MG/1
4 CAPSULE ORAL EVERY 2 HOUR PRN
Status: DISCONTINUED | OUTPATIENT
Start: 2024-01-27 | End: 2024-02-17

## 2024-01-27 RX ORDER — TERBUTALINE SULFATE 1 MG/ML
0.25 INJECTION SUBCUTANEOUS ONCE AS NEEDED
Status: DISCONTINUED | OUTPATIENT
Start: 2024-01-27 | End: 2024-02-17

## 2024-01-27 RX ORDER — CARBOPROST TROMETHAMINE 250 UG/ML
250 INJECTION, SOLUTION INTRAMUSCULAR ONCE AS NEEDED
Status: COMPLETED | OUTPATIENT
Start: 2024-01-27 | End: 2024-02-16

## 2024-01-27 RX ORDER — BETAMETHASONE SODIUM PHOSPHATE AND BETAMETHASONE ACETATE 3; 3 MG/ML; MG/ML
12 INJECTION, SUSPENSION INTRA-ARTICULAR; INTRALESIONAL; INTRAMUSCULAR; SOFT TISSUE ONCE
Status: COMPLETED | OUTPATIENT
Start: 2024-01-27 | End: 2024-01-27

## 2024-01-27 RX ORDER — LABETALOL HYDROCHLORIDE 5 MG/ML
20 INJECTION, SOLUTION INTRAVENOUS ONCE AS NEEDED
Status: COMPLETED | OUTPATIENT
Start: 2024-01-27 | End: 2024-01-29

## 2024-01-27 RX ORDER — ONDANSETRON 4 MG/1
4 TABLET, FILM COATED ORAL EVERY 6 HOURS PRN
Status: DISCONTINUED | OUTPATIENT
Start: 2024-01-27 | End: 2024-02-17

## 2024-01-27 RX ORDER — METOCLOPRAMIDE 10 MG/1
10 TABLET ORAL EVERY 6 HOURS PRN
Status: DISCONTINUED | OUTPATIENT
Start: 2024-01-27 | End: 2024-02-17

## 2024-01-27 RX ORDER — ACETAMINOPHEN 325 MG/1
975 TABLET ORAL ONCE
Status: COMPLETED | OUTPATIENT
Start: 2024-01-27 | End: 2024-01-27

## 2024-01-27 RX ORDER — ONDANSETRON HYDROCHLORIDE 2 MG/ML
4 INJECTION, SOLUTION INTRAVENOUS EVERY 6 HOURS PRN
Status: DISCONTINUED | OUTPATIENT
Start: 2024-01-27 | End: 2024-02-17

## 2024-01-27 RX ORDER — METOCLOPRAMIDE 10 MG/1
10 TABLET ORAL ONCE
Status: COMPLETED | OUTPATIENT
Start: 2024-01-27 | End: 2024-01-27

## 2024-01-27 RX ORDER — METOCLOPRAMIDE HYDROCHLORIDE 5 MG/ML
10 INJECTION INTRAMUSCULAR; INTRAVENOUS EVERY 6 HOURS PRN
Status: DISCONTINUED | OUTPATIENT
Start: 2024-01-27 | End: 2024-02-17

## 2024-01-27 RX ORDER — NIFEDIPINE 60 MG/1
60 TABLET, FILM COATED, EXTENDED RELEASE ORAL
Status: DISCONTINUED | OUTPATIENT
Start: 2024-01-27 | End: 2024-01-29

## 2024-01-27 RX ORDER — SODIUM CHLORIDE, SODIUM LACTATE, POTASSIUM CHLORIDE, CALCIUM CHLORIDE 600; 310; 30; 20 MG/100ML; MG/100ML; MG/100ML; MG/100ML
125 INJECTION, SOLUTION INTRAVENOUS CONTINUOUS
Status: DISCONTINUED | OUTPATIENT
Start: 2024-01-27 | End: 2024-02-19

## 2024-01-27 RX ORDER — TRANEXAMIC ACID 100 MG/ML
1000 INJECTION, SOLUTION INTRAVENOUS ONCE AS NEEDED
Status: COMPLETED | OUTPATIENT
Start: 2024-01-27 | End: 2024-02-16

## 2024-01-27 RX ORDER — METHYLERGONOVINE MALEATE 0.2 MG/ML
0.2 INJECTION INTRAVENOUS ONCE AS NEEDED
Status: DISCONTINUED | OUTPATIENT
Start: 2024-01-27 | End: 2024-02-17

## 2024-01-27 RX ORDER — OXYTOCIN/0.9 % SODIUM CHLORIDE 30/500 ML
60 PLASTIC BAG, INJECTION (ML) INTRAVENOUS ONCE AS NEEDED
Status: COMPLETED | OUTPATIENT
Start: 2024-01-27 | End: 2024-02-17

## 2024-01-27 RX ORDER — DIPHENHYDRAMINE HCL 25 MG
25 CAPSULE ORAL ONCE
Status: COMPLETED | OUTPATIENT
Start: 2024-01-27 | End: 2024-01-27

## 2024-01-27 RX ORDER — LIDOCAINE HYDROCHLORIDE 10 MG/ML
30 INJECTION INFILTRATION; PERINEURAL ONCE AS NEEDED
Status: DISCONTINUED | OUTPATIENT
Start: 2024-01-27 | End: 2024-02-17

## 2024-01-27 RX ADMIN — ACETAMINOPHEN 975 MG: 325 TABLET ORAL at 05:55

## 2024-01-27 RX ADMIN — DIPHENHYDRAMINE HYDROCHLORIDE 25 MG: 25 CAPSULE ORAL at 03:54

## 2024-01-27 RX ADMIN — MAGNESIUM SULFATE IN WATER 2 G/HR: 20 INJECTION, SOLUTION INTRAVENOUS at 08:33

## 2024-01-27 RX ADMIN — NIFEDIPINE 60 MG: 60 TABLET, FILM COATED, EXTENDED RELEASE ORAL at 07:32

## 2024-01-27 RX ADMIN — SODIUM CHLORIDE, POTASSIUM CHLORIDE, SODIUM LACTATE AND CALCIUM CHLORIDE 75 ML/HR: 600; 310; 30; 20 INJECTION, SOLUTION INTRAVENOUS at 06:00

## 2024-01-27 RX ADMIN — METOCLOPRAMIDE 10 MG: 10 TABLET ORAL at 05:55

## 2024-01-27 RX ADMIN — BETAMETHASONE ACETATE AND BETAMETHASONE SODIUM PHOSPHATE 12 MG: 3; 3 INJECTION, SUSPENSION INTRA-ARTICULAR; INTRALESIONAL; INTRAMUSCULAR; SOFT TISSUE at 14:44

## 2024-01-27 SDOH — SOCIAL STABILITY: SOCIAL INSECURITY: VERBAL ABUSE: DENIES

## 2024-01-27 SDOH — SOCIAL STABILITY: SOCIAL INSECURITY: HAVE YOU HAD THOUGHTS OF HARMING ANYONE ELSE?: NO

## 2024-01-27 SDOH — HEALTH STABILITY: MENTAL HEALTH: WISH TO BE DEAD (PAST 1 MONTH): NO

## 2024-01-27 SDOH — SOCIAL STABILITY: SOCIAL INSECURITY: ABUSE SCREEN: ADULT

## 2024-01-27 SDOH — HEALTH STABILITY: MENTAL HEALTH: WERE YOU ABLE TO COMPLETE ALL THE BEHAVIORAL HEALTH SCREENINGS?: YES

## 2024-01-27 SDOH — SOCIAL STABILITY: SOCIAL INSECURITY: ARE YOU OR HAVE YOU BEEN THREATENED OR ABUSED PHYSICALLY, EMOTIONALLY, OR SEXUALLY BY ANYONE?: NO

## 2024-01-27 SDOH — HEALTH STABILITY: MENTAL HEALTH: NON-SPECIFIC ACTIVE SUICIDAL THOUGHTS (PAST 1 MONTH): NO

## 2024-01-27 SDOH — HEALTH STABILITY: MENTAL HEALTH: SUICIDAL BEHAVIOR (LIFETIME): NO

## 2024-01-27 SDOH — SOCIAL STABILITY: SOCIAL INSECURITY: ARE THERE ANY APPARENT SIGNS OF INJURIES/BEHAVIORS THAT COULD BE RELATED TO ABUSE/NEGLECT?: NO

## 2024-01-27 SDOH — ECONOMIC STABILITY: HOUSING INSECURITY: DO YOU FEEL UNSAFE GOING BACK TO THE PLACE WHERE YOU ARE LIVING?: NO

## 2024-01-27 SDOH — SOCIAL STABILITY: SOCIAL INSECURITY: HAS ANYONE EVER THREATENED TO HURT YOUR FAMILY OR YOUR PETS?: NO

## 2024-01-27 SDOH — SOCIAL STABILITY: SOCIAL INSECURITY: DOES ANYONE TRY TO KEEP YOU FROM HAVING/CONTACTING OTHER FRIENDS OR DOING THINGS OUTSIDE YOUR HOME?: NO

## 2024-01-27 SDOH — SOCIAL STABILITY: SOCIAL INSECURITY: PHYSICAL ABUSE: DENIES

## 2024-01-27 SDOH — SOCIAL STABILITY: SOCIAL INSECURITY: DO YOU FEEL ANYONE HAS EXPLOITED OR TAKEN ADVANTAGE OF YOU FINANCIALLY OR OF YOUR PERSONAL PROPERTY?: NO

## 2024-01-27 ASSESSMENT — PAIN DESCRIPTION - DESCRIPTORS
DESCRIPTORS: ACHING

## 2024-01-27 ASSESSMENT — PAIN SCALES - GENERAL
PAINLEVEL_OUTOF10: 0 - NO PAIN
PAINLEVEL_OUTOF10: 2
PAINLEVEL_OUTOF10: 0 - NO PAIN
PAINLEVEL_OUTOF10: 1
PAINLEVEL_OUTOF10: 0 - NO PAIN
PAINLEVEL_OUTOF10: 1
PAINLEVEL_OUTOF10: 2
PAINLEVEL_OUTOF10: 1
PAINLEVEL_OUTOF10: 3
PAINLEVEL_OUTOF10: 0 - NO PAIN
PAINLEVEL_OUTOF10: 1
PAINLEVEL_OUTOF10: 0 - NO PAIN
PAINLEVEL_OUTOF10: 0 - NO PAIN
PAINLEVEL_OUTOF10: 1
PAINLEVEL_OUTOF10: 0 - NO PAIN
PAINLEVEL_OUTOF10: 2
PAINLEVEL_OUTOF10: 0 - NO PAIN

## 2024-01-27 ASSESSMENT — LIFESTYLE VARIABLES
HOW OFTEN DO YOU HAVE 6 OR MORE DRINKS ON ONE OCCASION: NEVER
HOW OFTEN DO YOU HAVE A DRINK CONTAINING ALCOHOL: NEVER
HOW MANY STANDARD DRINKS CONTAINING ALCOHOL DO YOU HAVE ON A TYPICAL DAY: PATIENT DOES NOT DRINK
SKIP TO QUESTIONS 9-10: 1
AUDIT-C TOTAL SCORE: 0
AUDIT-C TOTAL SCORE: 0

## 2024-01-27 ASSESSMENT — ACTIVITIES OF DAILY LIVING (ADL): LACK_OF_TRANSPORTATION: NO

## 2024-01-27 ASSESSMENT — PAIN - FUNCTIONAL ASSESSMENT
PAIN_FUNCTIONAL_ASSESSMENT: 0-10

## 2024-01-27 ASSESSMENT — PATIENT HEALTH QUESTIONNAIRE - PHQ9
SUM OF ALL RESPONSES TO PHQ9 QUESTIONS 1 & 2: 0
2. FEELING DOWN, DEPRESSED OR HOPELESS: NOT AT ALL
1. LITTLE INTEREST OR PLEASURE IN DOING THINGS: NOT AT ALL

## 2024-01-27 NOTE — H&P
Obstetrical Admission History and Physical     Emelyn Perez is a 33 y.o. .     Chief Complaint: Hypertension    Assessment/Plan    32yo  @ 23w5d by IVF dating presenting as transfer for severe range BP    sPEC  - Diagnosed by severe range BP requiring IV tx, has also had severe ranges >4 hrs apart  - S/p IV lab  at [place] [facility]   - HELLP labs were negative x1, P:C 1.43   - Asx  - Magnesium started at OSH, will cont IV Mg @ 2 g/hr, no si/sx of toxicity. Plan to continue Mg for 12 hours minimum  - PO Nifed 30 started at OSH, will give additional 30 now to uptitrate to nifed 60, given that pt is intermittently severe range currently  - Monitor UOP  - Discussed with patient recommendation for inpatient admission due to sPEC, and recommendation for delivery at 34.0 wga at the latest. Discussed indications for delivery before 34wks including but not limited to rapid uptitration of antihypertensives.    Fetal status  - cEFM while on L&D  - AGA tracing currently.  - BMZ #1 given at OSH , 2nd dose ordered for 24hrs later  - Marginal insertion of umbilical cord, last fetal biometry low-normal with EFW 10% on , follow up US  pending final read. Bedside growth scan on admission with EFW 537g, 11%. Plan for formal US on Monday if still pregnant.  - Breech presentation   - NICU consulted    Routine:  - GBS collected, pending  - TDAP: will administer when appropriate  - Flu 10/23  - 1hr: not yet completed, will obtain fasting BG and postprandial checks in s/o BMZ  - BCM: undecided    Dispo: Admit for sPEC. Discussed with patient recommendation for inpatient admission until delivery at 34wks or sooner    Pt seen and d/w Dr. Lyly Garza MD PGY-2        Principal Problem:    Labor and delivery indication for care or intervention      Subjective   Pt reports that she noticed leg and hand swelling over the last few days, and then was worried so took BP at work today. States BP ranged 140-160/ at  work. Pt then presented to [facility]. Denies HA, vision changes, RUQ pain, CP, SOB today.    OSH hosp course: Received lab 20/40 for severe ranges. Mag started. 1st BMZ given approx 1445. Nifed 30 administered. Transferred to [facility].     Obstetrical History   OB History    Para Term  AB Living   1             SAB IAB Ectopic Multiple Live Births                  # Outcome Date GA Lbr Chevy/2nd Weight Sex Delivery Anes PTL Lv   1 Current                Past Medical History  Past Medical History:   Diagnosis Date     product of in vitro fertilization (IVF) pregnancy     Personal history of other diseases of the respiratory system 2020    History of sinusitis        Past Surgical History   Past Surgical History:   Procedure Laterality Date    OTHER SURGICAL HISTORY  2020    No history of surgery       Social History  Social History     Tobacco Use    Smoking status: Never     Passive exposure: Never    Smokeless tobacco: Never   Substance Use Topics    Alcohol use: Never     Substance and Sexual Activity   Drug Use Never       Allergies  Patient has no known allergies.     Medications  Medications Prior to Admission   Medication Sig Dispense Refill Last Dose    chorionic gonadotropin (Pregnyl) 10,000 unit injection RECONSTITUTE ACCORDING TO INSTRUCTIONS AND INJECT 10,000 UNITS (1 ML) UNDER THE SKIN AS A ONE TIME DOSE, AS DIRECTED PER PROVIDER FOR TRIGGER. (Patient not taking: Reported on 2024) 1 each 1 More than a month    chorionic gonadotropin (Pregnyl) 10,000 unit injection RECONSTITUTE ACCORDING TO INSTRUCTIONS AND INJECT 10,000 UNITS (1 ML) UNDER THE SKIN AS A ONE TIME DOSE, AS DIRECTED PER PROVIDER FOR TRIGGER. (Patient not taking: Reported on 2024) 1 each 2 More than a month    lidocaine-prilocaine (Emla) 2.5-2.5 % cream APPLY TO TREATMENT AREA 1 HOUR PRIOR TO INJECTION. (Patient not taking: Reported on 2024) 60 g 2 More than a month    prenatal vit no.124-iron-folic  "(Prenatal Vitamin) 27 mg iron- 800 mcg tablet Take by mouth.       progesterone 50 mg/mL injection DRAW UP 1.5 ML (75 MG) AND INJECT INTO THE MUSCLE AT THE SAME TIME EACH MORNING AS DIRECTED PER PROVIDER. (Patient not taking: Reported on 1/26/2024) 50 mL 2 More than a month    syringe, disposable, 3 mL syringe USED TO ADMINISTER PROGESTERONE IN OIL (Patient not taking: Reported on 1/26/2024) 30 each 2 More than a month    transparent dressings 2 3/8 X 2 3/4 \" bandage USE AS DIRECTED WITH LIDOCAINE CREAM (Patient not taking: Reported on 1/26/2024) 30 each 2 More than a month       Objective    Last Vitals  Temp Pulse Resp BP MAP O2 Sat   36.5 °C (97.7 °F) 88 19 (!) 150/88   98 %     Physical Examination  Constitutional: No visible distress, alert and cooperative  Eyes: clear sclera  Head/Neck: Normocephalic  Respiratory/Thorax: Normal respiratory effort on RA  Cardiovascular: Reg rate  Gastrointestinal: soft, nondistended, nontender, gravid  Musculoskeletal: grossly normal ROM  Extremities: No LE edema. Scant edema in hands.  Neurological: A&Ox3  Psychological: Appropriate mood and behavior    BSUS: breech presentation, posterior placenta, EFW 537g (11%)      Lab Review  Lab Results   Component Value Date    WBC 19.6 (H) 01/26/2024    HGB 12.9 01/26/2024    HCT 38.7 01/26/2024     01/26/2024     Lab Results   Component Value Date    GLUCOSE 98 01/26/2024     01/26/2024    K 3.8 01/26/2024     01/26/2024    CO2 22 01/26/2024    ANIONGAP 12 01/26/2024    BUN 15 01/26/2024    CREATININE 0.59 01/26/2024    EGFR >90 01/26/2024    CALCIUM 9.0 01/26/2024    ALBUMIN 3.6 01/26/2024    PROT 6.2 (L) 01/26/2024    ALKPHOS 43 01/26/2024    ALT 22 01/26/2024    AST 21 01/26/2024    BILITOT 0.3 01/26/2024     Lab Results   Component Value Date    UTPCR 1.43 (H) 01/26/2024       "

## 2024-01-27 NOTE — PROGRESS NOTES
OB Progress Note    Reason for Consult: sPEC    Assessment/Plan    32yo  @ 23w6d by IVF dating presenting as transfer for severe range BP     sPEC  - Diagnosed by severe range BP requiring IV tx, has also had severe ranges >4 hrs apart  - S/p IV lab  at [facility]   - HELLP labs were negative x2, P:C 1.43   - Currently has 3/10 HA s/p tylenol -> will give benadryl   - Magnesium started at OSH at approx 1430, so already s/p 12 hrs magnesium, and will cont IV Mg @ 2 g/hr at this time given headache  - PO Nifed 30 started at OSH -> nifed 60 daily ()  - Discussed with patient recommendation for inpatient admission due to sPEC, and recommendation for delivery at 34.0 wga at the latest. Discussed indications for delivery before 34wks including but not limited to rapid uptitration of antihypertensives.     Fetal status  - cEFM while on L&D, AGA  - AGA tracing currently.  - BMZ #1 given at OSH , 2nd dose ordered for 24hrs later  - Marginal insertion of umbilical cord, last fetal biometry low-normal with EFW 10% on , follow up US  pending final read. Bedside growth scan on admission with EFW 537g, 11%. Plan for formal US on Monday if still pregnant.  - Breech presentation   - NICU consulted, to see pt today     Routine:  - GBS collected, pending  - TDAP: will administer when appropriate  - Flu 10/23  - 1hr: not yet completed, will obtain fasting BG and postprandial checks in s/o BMZ  - BCM: undecided     Dispo: Admit for sPEC. Discussed with patient recommendation for inpatient admission until delivery at 34wks or sooner    To be discussed with Dr.Onslow Domonique Garza MD PGY-2  MFM [id number]    Subjective   Pt feeling okay this morning, reports HA 2/10 that is similar to headaches she gets when she is very tired. Mag infusing. Denies CP, SOB, BRET pain, vision changes. Swelling improved.    Obstetrical History   OB History    Para Term  AB Living   1             SAB IAB Ectopic Multiple  Live Births                  # Outcome Date GA Lbr Chevy/2nd Weight Sex Delivery Anes PTL Lv   1 Current                Past Medical History  Past Medical History:   Diagnosis Date     product of in vitro fertilization (IVF) pregnancy     Personal history of other diseases of the respiratory system 2020    History of sinusitis        Past Surgical History   Past Surgical History:   Procedure Laterality Date    OTHER SURGICAL HISTORY  2020    No history of surgery       Social History  Social History     Tobacco Use    Smoking status: Never     Passive exposure: Never    Smokeless tobacco: Never   Substance Use Topics    Alcohol use: Never     Substance and Sexual Activity   Drug Use Never       Allergies  Patient has no known allergies.     Medications  Medications Prior to Admission   Medication Sig Dispense Refill Last Dose    [] chorionic gonadotropin (Pregnyl) 10,000 unit injection RECONSTITUTE ACCORDING TO INSTRUCTIONS AND INJECT 10,000 UNITS (1 ML) UNDER THE SKIN AS A ONE TIME DOSE, AS DIRECTED PER PROVIDER FOR TRIGGER. (Patient not taking: Reported on 2024) 1 each 1 More than a month    chorionic gonadotropin (Pregnyl) 10,000 unit injection RECONSTITUTE ACCORDING TO INSTRUCTIONS AND INJECT 10,000 UNITS (1 ML) UNDER THE SKIN AS A ONE TIME DOSE, AS DIRECTED PER PROVIDER FOR TRIGGER. (Patient not taking: Reported on 2024) 1 each 2 More than a month    lidocaine-prilocaine (Emla) 2.5-2.5 % cream APPLY TO TREATMENT AREA 1 HOUR PRIOR TO INJECTION. (Patient not taking: Reported on 2024) 60 g 2 More than a month    prenatal vit no.124-iron-folic (Prenatal Vitamin) 27 mg iron- 800 mcg tablet Take by mouth.       progesterone 50 mg/mL injection DRAW UP 1.5 ML (75 MG) AND INJECT INTO THE MUSCLE AT THE SAME TIME EACH MORNING AS DIRECTED PER PROVIDER. (Patient not taking: Reported on 2024) 50 mL 2 More than a month    syringe, disposable, 3 mL syringe USED TO ADMINISTER  "PROGESTERONE IN OIL (Patient not taking: Reported on 1/26/2024) 30 each 2 More than a month    transparent dressings 2 3/8 X 2 3/4 \" bandage USE AS DIRECTED WITH LIDOCAINE CREAM (Patient not taking: Reported on 1/26/2024) 30 each 2 More than a month       Objective    Last Vitals  Temp Pulse Resp BP MAP O2 Sat   37.2 °C (99 °F) 81 18 119/73   100 %     Physical Examination  Constitutional: No visible distress, alert and cooperative  Eyes: clear sclera  Head/Neck: Normocephalic  Respiratory/Thorax: Normal respiratory effort on RA  Cardiovascular: Reg rate  Gastrointestinal: soft, nondistended, nontender, gravid  Musculoskeletal: grossly normal ROM  Extremities: No LE edema. Scant edema in hands.  Neurological: A&Ox3  Psychological: Appropriate mood and behavior     BSUS: breech presentation, posterior placenta, EFW 537g (11%)    Lab Review  No results found for: \"GRPBSTREP\"  Lab Results   Component Value Date    GLUCOSE 120 (H) 01/26/2024     (L) 01/26/2024    K 4.0 01/26/2024     01/26/2024    CO2 19 (L) 01/26/2024    ANIONGAP 16 01/26/2024    BUN 14 01/26/2024    CREATININE 0.55 01/26/2024    EGFR >90 01/26/2024    CALCIUM 8.0 (L) 01/26/2024    ALBUMIN 3.9 01/26/2024    PROT 6.3 (L) 01/26/2024    ALKPHOS 49 01/26/2024    ALT 23 01/26/2024    AST 20 01/26/2024    BILITOT 0.3 01/26/2024     "

## 2024-01-27 NOTE — SIGNIFICANT EVENT
At bedside to assess patient. Has now been on IV Mg for >12hrs. Upon transfer she initially was asymptomatic, then developed a mild 1/10 headache. HA then resolved with tylenol, but now is back at 3/10 in severity. Otherwise, denies vision changes, CP, SOB, or RUQ pain.    Visit Vitals  /61   Pulse 87   Temp 36.3 °C (97.3 °F) (Temporal)   Resp 18     sPEC  - Diagnosed by severe range BP requiring IV tx, has also had severe ranges >4 hrs apart  - S/p IV lab 20/40 at [facility] 1/26  - HELLP labs were negative x2, P:C 1.43   - Bps currently controlled on nifed 60mg daily (1/26)   - Has now received >12hrs of IV Mg, however given recurrence of headache will continue Mg until HA resolved. Will treat now with benadryl. Continue to monitor on L&D while on Mg.     Fetal well being  - cEFM while on L&D  - AGA tracing currently.  - BMZ #1 given at OSH 1/26, 2nd dose ordered for 24hrs later  - Marginal insertion of umbilical cord, last fetal biometry low-normal with EFW 10% on 12/26, follow up US 1/12 pending final read. Bedside growth scan on admission with EFW 537g, 11%. Plan for formal US on Monday if still pregnant.  - Breech presentation   - NICU consulted    D/w Dr. Lyly Garcia MD PGY-4  Obstetrics and Gynecology

## 2024-01-27 NOTE — CARE PLAN
The patient's goals for the shift include patient will remain pregnant as long as FHR remains reassuring.    The clinical goals for the shift include maternal BP remains <160/110, FHR remains reassuring    Over the shift, the patient made progress towards goals. Maternal BP remained <160/110, HA resolved. Transferred to [facility] 4.

## 2024-01-27 NOTE — CARE PLAN
The patient's goals for the shift include patient will remain pregnant as long as FHR remains reassuring.    The clinical goals for the shift include Patient will not fall by end of shift      Problem: Antepartum  Goal: FHR remains reassuring  Outcome: Progressing  Goal: Minimize anxiety/maximize coping  Outcome: Progressing     Problem: Vaginal Birth or  Section  Goal: Minimal s/sx of HDP and BP<160/110  Outcome: Progressing     Problem: Hypertensive Disorder of Pregnancy (HDP)  Goal: Minimal s/sx of HDP and BP<160/110  Outcome: Progressing     Problem: Pain - Adult  Goal: Verbalizes/displays adequate comfort level or baseline comfort level  Outcome: Progressing     Problem: Safety - Adult  Goal: Free from fall injury  Outcome: Progressing

## 2024-01-28 LAB
GLUCOSE BLD MANUAL STRIP-MCNC: 120 MG/DL (ref 74–99)
GLUCOSE BLD MANUAL STRIP-MCNC: 86 MG/DL (ref 74–99)
GLUCOSE BLD MANUAL STRIP-MCNC: 96 MG/DL (ref 74–99)

## 2024-01-28 PROCEDURE — 59025 FETAL NON-STRESS TEST: CPT | Mod: GC | Performed by: STUDENT IN AN ORGANIZED HEALTH CARE EDUCATION/TRAINING PROGRAM

## 2024-01-28 PROCEDURE — 99232 SBSQ HOSP IP/OBS MODERATE 35: CPT | Performed by: STUDENT IN AN ORGANIZED HEALTH CARE EDUCATION/TRAINING PROGRAM

## 2024-01-28 PROCEDURE — 82947 ASSAY GLUCOSE BLOOD QUANT: CPT

## 2024-01-28 PROCEDURE — 2500000004 HC RX 250 GENERAL PHARMACY W/ HCPCS (ALT 636 FOR OP/ED)

## 2024-01-28 PROCEDURE — 59025 FETAL NON-STRESS TEST: CPT | Performed by: STUDENT IN AN ORGANIZED HEALTH CARE EDUCATION/TRAINING PROGRAM

## 2024-01-28 PROCEDURE — 1210000001 HC SEMI-PRIVATE ROOM DAILY

## 2024-01-28 RX ADMIN — NIFEDIPINE 60 MG: 60 TABLET, FILM COATED, EXTENDED RELEASE ORAL at 07:04

## 2024-01-28 ASSESSMENT — PAIN SCALES - GENERAL: PAINLEVEL_OUTOF10: 0 - NO PAIN

## 2024-01-28 NOTE — PROGRESS NOTES
OB Progress Note    Reason for Consult: sPEC    Assessment/Plan    32yo  @ 24w0d by IVF dating presenting as transfer for severe range BP     sPEC  - Diagnosed by severe range BP requiring IV tx, has also had severe ranges >4 hrs apart  - S/p IV lab  at [facility]   - HELLP labs were negative x2, P:C 1.43   - Currently has 3/10 HA s/p tylenol -> will give benadryl   - Magnesium started at OSH at approx 1430, so already s/p 12 hrs magnesium, and will cont IV Mg @ 2 g/hr at this time given headache  - PO Nifed 30 started at OSH -> nifed 60 daily ()  - Discussed with patient recommendation for inpatient admission due to sPEC, and recommendation for delivery at 34.0 wga at the latest. Discussed indications for delivery before 34wks including but not limited to rapid uptitration of antihypertensives.     Fetal status  - cEFM while on L&D, AGA  - AGA tracing currently.  - BMZ #1 given at OSH , 2nd dose ordered for 24hrs later  - Marginal insertion of umbilical cord, last fetal biometry low-normal with EFW 10% on , follow up US  pending final read. Bedside growth scan on admission with EFW 537g, 11%. Plan for formal US tomorrow.  - Breech presentation, re-scan if headed toward delivery.  - NICU consulted, to see pt today     Routine:  - GBS collected, pending  - TDAP: will administer when appropriate  - Flu 10/23  - 1hr: not yet completed, will obtain fasting BG and postprandial checks in s/o BMZ  - BCM: undecided     Dispo: Continued inpatient BP management and observation.    D/w Dr. Michael Bustamante MD PGY-3  M[facility] Pager [phone]  Phone: [phone]     Subjective   Pt feeling okay this morning, Denies CP, SOB, BRET pain, vision changes. Swelling improved.    Obstetrical History   OB History    Para Term  AB Living   1             SAB IAB Ectopic Multiple Live Births                  # Outcome Date GA Lbr Chevy/2nd Weight Sex Delivery Anes PTL Lv   1 Current                Past  Medical History  Past Medical History:   Diagnosis Date    [place] product of in vitro fertilization (IVF) pregnancy     Personal history of other diseases of the respiratory system 2020    History of sinusitis        Past Surgical History   Past Surgical History:   Procedure Laterality Date    OTHER SURGICAL HISTORY  2020    No history of surgery       Social History  Social History     Tobacco Use    Smoking status: Never     Passive exposure: Never    Smokeless tobacco: Never   Substance Use Topics    Alcohol use: Never     Substance and Sexual Activity   Drug Use Never       Allergies  Patient has no known allergies.     Medications  Medications Prior to Admission   Medication Sig Dispense Refill Last Dose    [] chorionic gonadotropin (Pregnyl) 10,000 unit injection RECONSTITUTE ACCORDING TO INSTRUCTIONS AND INJECT 10,000 UNITS (1 ML) UNDER THE SKIN AS A ONE TIME DOSE, AS DIRECTED PER PROVIDER FOR TRIGGER. (Patient not taking: Reported on 2024) 1 each 1 More than a month    chorionic gonadotropin (Pregnyl) 10,000 unit injection RECONSTITUTE ACCORDING TO INSTRUCTIONS AND INJECT 10,000 UNITS (1 ML) UNDER THE SKIN AS A ONE TIME DOSE, AS DIRECTED PER PROVIDER FOR TRIGGER. (Patient not taking: Reported on 2024) 1 each 2 More than a month    lidocaine-prilocaine (Emla) 2.5-2.5 % cream APPLY TO TREATMENT AREA 1 HOUR PRIOR TO INJECTION. (Patient not taking: Reported on 2024) 60 g 2 More than a month    prenatal vit no.124-iron-folic (Prenatal Vitamin) 27 mg iron- 800 mcg tablet Take by mouth.       progesterone 50 mg/mL injection DRAW UP 1.5 ML (75 MG) AND INJECT INTO THE MUSCLE AT THE SAME TIME EACH MORNING AS DIRECTED PER PROVIDER. (Patient not taking: Reported on 2024) 50 mL 2 More than a month    syringe, disposable, 3 mL syringe USED TO ADMINISTER PROGESTERONE IN OIL (Patient not taking: Reported on 2024) 30 each 2 More than a month    transparent dressings 2 3/8 X 2 3/4  "\" bandage USE AS DIRECTED WITH LIDOCAINE CREAM (Patient not taking: Reported on 1/26/2024) 30 each 2 More than a month       Objective    Last Vitals  Temp Pulse Resp BP MAP O2 Sat   37 °C (98.6 °F) 64 18 (!) 141/78   98 %     Physical Examination  Constitutional: No visible distress, alert and cooperative  Eyes: clear sclera  Head/Neck: Normocephalic  Respiratory/Thorax: Normal respiratory effort on RA  Cardiovascular: Reg rate  Gastrointestinal: soft, nondistended, nontender, gravid  Musculoskeletal: grossly normal ROM  Extremities: No LE edema. Scant edema in hands.  Neurological: A&Ox3  Psychological: Appropriate mood and behavior     BSUS: breech presentation, posterior placenta, EFW 537g (11%)    Lab Review  No results found for: \"GRPBSTREP\"  Lab Results   Component Value Date    GLUCOSE 120 (H) 01/26/2024     (L) 01/26/2024    K 4.0 01/26/2024     01/26/2024    CO2 19 (L) 01/26/2024    ANIONGAP 16 01/26/2024    BUN 14 01/26/2024    CREATININE 0.55 01/26/2024    EGFR >90 01/26/2024    CALCIUM 8.0 (L) 01/26/2024    ALBUMIN 3.9 01/26/2024    PROT 6.3 (L) 01/26/2024    ALKPHOS 49 01/26/2024    ALT 23 01/26/2024    AST 20 01/26/2024    BILITOT 0.3 01/26/2024     "

## 2024-01-29 ENCOUNTER — APPOINTMENT (OUTPATIENT)
Dept: RADIOLOGY | Facility: HOSPITAL | Age: 34
End: 2024-01-29
Payer: COMMERCIAL

## 2024-01-29 PROBLEM — O14.12: Status: ACTIVE | Noted: 2024-01-29

## 2024-01-29 PROBLEM — O36.5920 POOR FETAL GROWTH AFFECTING MANAGEMENT OF MOTHER IN SECOND TRIMESTER (HHS-HCC): Status: ACTIVE | Noted: 2024-01-29

## 2024-01-29 LAB
ALBUMIN SERPL BCP-MCNC: 3.3 G/DL (ref 3.4–5)
ALBUMIN SERPL BCP-MCNC: 3.6 G/DL (ref 3.4–5)
ALP SERPL-CCNC: 40 U/L (ref 33–110)
ALP SERPL-CCNC: 42 U/L (ref 33–110)
ALT SERPL W P-5'-P-CCNC: 16 U/L (ref 7–45)
ALT SERPL W P-5'-P-CCNC: 24 U/L (ref 7–45)
ANION GAP SERPL CALC-SCNC: 13 MMOL/L (ref 10–20)
ANION GAP SERPL CALC-SCNC: 14 MMOL/L (ref 10–20)
AST SERPL W P-5'-P-CCNC: 16 U/L (ref 9–39)
AST SERPL W P-5'-P-CCNC: 22 U/L (ref 9–39)
BILIRUB SERPL-MCNC: 0.3 MG/DL (ref 0–1.2)
BILIRUB SERPL-MCNC: 0.3 MG/DL (ref 0–1.2)
BUN SERPL-MCNC: 16 MG/DL (ref 6–23)
BUN SERPL-MCNC: 18 MG/DL (ref 6–23)
CALCIUM SERPL-MCNC: 9 MG/DL (ref 8.6–10.6)
CALCIUM SERPL-MCNC: 9 MG/DL (ref 8.6–10.6)
CHLORIDE SERPL-SCNC: 105 MMOL/L (ref 98–107)
CHLORIDE SERPL-SCNC: 106 MMOL/L (ref 98–107)
CO2 SERPL-SCNC: 21 MMOL/L (ref 21–32)
CO2 SERPL-SCNC: 22 MMOL/L (ref 21–32)
CREAT SERPL-MCNC: 0.54 MG/DL (ref 0.5–1.05)
CREAT SERPL-MCNC: 0.66 MG/DL (ref 0.5–1.05)
EGFRCR SERPLBLD CKD-EPI 2021: >90 ML/MIN/1.73M*2
EGFRCR SERPLBLD CKD-EPI 2021: >90 ML/MIN/1.73M*2
ERYTHROCYTE [DISTWIDTH] IN BLOOD BY AUTOMATED COUNT: 13.8 % (ref 11.5–14.5)
ERYTHROCYTE [DISTWIDTH] IN BLOOD BY AUTOMATED COUNT: 13.8 % (ref 11.5–14.5)
GLUCOSE SERPL-MCNC: 66 MG/DL (ref 74–99)
GLUCOSE SERPL-MCNC: 80 MG/DL (ref 74–99)
GP B STREP GENITAL QL CULT: NORMAL
HCT VFR BLD AUTO: 37.3 % (ref 36–46)
HCT VFR BLD AUTO: 40.7 % (ref 36–46)
HGB BLD-MCNC: 12.8 G/DL (ref 12–16)
HGB BLD-MCNC: 13.4 G/DL (ref 12–16)
MCH RBC QN AUTO: 30 PG (ref 26–34)
MCH RBC QN AUTO: 30.8 PG (ref 26–34)
MCHC RBC AUTO-ENTMCNC: 32.9 G/DL (ref 32–36)
MCHC RBC AUTO-ENTMCNC: 34.3 G/DL (ref 32–36)
MCV RBC AUTO: 90 FL (ref 80–100)
MCV RBC AUTO: 91 FL (ref 80–100)
NRBC BLD-RTO: 0 /100 WBCS (ref 0–0)
NRBC BLD-RTO: 0 /100 WBCS (ref 0–0)
PLATELET # BLD AUTO: 269 X10*3/UL (ref 150–450)
PLATELET # BLD AUTO: 270 X10*3/UL (ref 150–450)
POTASSIUM SERPL-SCNC: 4.1 MMOL/L (ref 3.5–5.3)
POTASSIUM SERPL-SCNC: 4.4 MMOL/L (ref 3.5–5.3)
PROT SERPL-MCNC: 5.7 G/DL (ref 6.4–8.2)
PROT SERPL-MCNC: 6.1 G/DL (ref 6.4–8.2)
RBC # BLD AUTO: 4.15 X10*6/UL (ref 4–5.2)
RBC # BLD AUTO: 4.46 X10*6/UL (ref 4–5.2)
SODIUM SERPL-SCNC: 136 MMOL/L (ref 136–145)
SODIUM SERPL-SCNC: 137 MMOL/L (ref 136–145)
WBC # BLD AUTO: 16.2 X10*3/UL (ref 4.4–11.3)
WBC # BLD AUTO: 16.5 X10*3/UL (ref 4.4–11.3)

## 2024-01-29 PROCEDURE — 2500000004 HC RX 250 GENERAL PHARMACY W/ HCPCS (ALT 636 FOR OP/ED)

## 2024-01-29 PROCEDURE — 36415 COLL VENOUS BLD VENIPUNCTURE: CPT | Performed by: STUDENT IN AN ORGANIZED HEALTH CARE EDUCATION/TRAINING PROGRAM

## 2024-01-29 PROCEDURE — 2500000004 HC RX 250 GENERAL PHARMACY W/ HCPCS (ALT 636 FOR OP/ED): Performed by: STUDENT IN AN ORGANIZED HEALTH CARE EDUCATION/TRAINING PROGRAM

## 2024-01-29 PROCEDURE — 99232 SBSQ HOSP IP/OBS MODERATE 35: CPT | Performed by: STUDENT IN AN ORGANIZED HEALTH CARE EDUCATION/TRAINING PROGRAM

## 2024-01-29 PROCEDURE — 99222 1ST HOSP IP/OBS MODERATE 55: CPT | Performed by: PEDIATRICS

## 2024-01-29 PROCEDURE — 1100000001 HC PRIVATE ROOM DAILY

## 2024-01-29 PROCEDURE — 99223 1ST HOSP IP/OBS HIGH 75: CPT | Performed by: STUDENT IN AN ORGANIZED HEALTH CARE EDUCATION/TRAINING PROGRAM

## 2024-01-29 PROCEDURE — 80053 COMPREHEN METABOLIC PANEL: CPT | Performed by: STUDENT IN AN ORGANIZED HEALTH CARE EDUCATION/TRAINING PROGRAM

## 2024-01-29 PROCEDURE — 76820 UMBILICAL ARTERY ECHO: CPT

## 2024-01-29 PROCEDURE — 76816 OB US FOLLOW-UP PER FETUS: CPT | Performed by: OBSTETRICS & GYNECOLOGY

## 2024-01-29 PROCEDURE — 59025 FETAL NON-STRESS TEST: CPT | Performed by: OBSTETRICS & GYNECOLOGY

## 2024-01-29 PROCEDURE — 76816 OB US FOLLOW-UP PER FETUS: CPT

## 2024-01-29 PROCEDURE — 2500000001 HC RX 250 WO HCPCS SELF ADMINISTERED DRUGS (ALT 637 FOR MEDICARE OP): Performed by: STUDENT IN AN ORGANIZED HEALTH CARE EDUCATION/TRAINING PROGRAM

## 2024-01-29 PROCEDURE — 85027 COMPLETE CBC AUTOMATED: CPT | Performed by: STUDENT IN AN ORGANIZED HEALTH CARE EDUCATION/TRAINING PROGRAM

## 2024-01-29 PROCEDURE — 76820 UMBILICAL ARTERY ECHO: CPT | Performed by: OBSTETRICS & GYNECOLOGY

## 2024-01-29 RX ORDER — LABETALOL 100 MG/1
200 TABLET, FILM COATED ORAL 2 TIMES DAILY
Status: DISCONTINUED | OUTPATIENT
Start: 2024-01-29 | End: 2024-01-29

## 2024-01-29 RX ORDER — LABETALOL HYDROCHLORIDE 5 MG/ML
40 INJECTION, SOLUTION INTRAVENOUS ONCE
Status: COMPLETED | OUTPATIENT
Start: 2024-01-29 | End: 2024-01-29

## 2024-01-29 RX ORDER — HYDROXYZINE HYDROCHLORIDE 25 MG/1
50 TABLET, FILM COATED ORAL ONCE
Status: COMPLETED | OUTPATIENT
Start: 2024-01-29 | End: 2024-01-29

## 2024-01-29 RX ORDER — LABETALOL 100 MG/1
200 TABLET, FILM COATED ORAL ONCE
Status: COMPLETED | OUTPATIENT
Start: 2024-01-29 | End: 2024-01-29

## 2024-01-29 RX ORDER — LABETALOL HYDROCHLORIDE 5 MG/ML
20 INJECTION, SOLUTION INTRAVENOUS ONCE
Status: COMPLETED | OUTPATIENT
Start: 2024-01-29 | End: 2024-01-29

## 2024-01-29 RX ORDER — LABETALOL 100 MG/1
400 TABLET, FILM COATED ORAL EVERY 12 HOURS
Status: DISCONTINUED | OUTPATIENT
Start: 2024-01-30 | End: 2024-02-01

## 2024-01-29 RX ORDER — NIFEDIPINE 90 MG/1
90 TABLET, EXTENDED RELEASE ORAL DAILY
Status: DISCONTINUED | OUTPATIENT
Start: 2024-01-30 | End: 2024-01-29

## 2024-01-29 RX ORDER — NIFEDIPINE 60 MG/1
60 TABLET, FILM COATED, EXTENDED RELEASE ORAL EVERY 12 HOURS
Status: DISCONTINUED | OUTPATIENT
Start: 2024-01-30 | End: 2024-02-17

## 2024-01-29 RX ORDER — NIFEDIPINE 30 MG/1
30 TABLET, FILM COATED, EXTENDED RELEASE ORAL ONCE
Status: COMPLETED | OUTPATIENT
Start: 2024-01-29 | End: 2024-01-29

## 2024-01-29 RX ORDER — LABETALOL 100 MG/1
200 TABLET, FILM COATED ORAL EVERY 12 HOURS
Status: DISCONTINUED | OUTPATIENT
Start: 2024-01-30 | End: 2024-01-29

## 2024-01-29 RX ORDER — LABETALOL HYDROCHLORIDE 5 MG/ML
20 INJECTION, SOLUTION INTRAVENOUS ONCE
Status: DISCONTINUED | OUTPATIENT
Start: 2024-01-29 | End: 2024-01-29

## 2024-01-29 RX ORDER — HYDRALAZINE HYDROCHLORIDE 20 MG/ML
5 INJECTION INTRAMUSCULAR; INTRAVENOUS ONCE
Status: DISCONTINUED | OUTPATIENT
Start: 2024-01-29 | End: 2024-01-29

## 2024-01-29 RX ORDER — NIFEDIPINE 60 MG/1
60 TABLET, FILM COATED, EXTENDED RELEASE ORAL 2 TIMES DAILY
Status: DISCONTINUED | OUTPATIENT
Start: 2024-01-30 | End: 2024-01-29

## 2024-01-29 RX ADMIN — LABETALOL HYDROCHLORIDE 20 MG: 5 INJECTION, SOLUTION INTRAVENOUS at 12:55

## 2024-01-29 RX ADMIN — HYDROXYZINE HYDROCHLORIDE 50 MG: 25 TABLET, FILM COATED ORAL at 19:43

## 2024-01-29 RX ADMIN — HYDRALAZINE HYDROCHLORIDE 5 MG: 20 INJECTION INTRAMUSCULAR; INTRAVENOUS at 16:22

## 2024-01-29 RX ADMIN — NIFEDIPINE 30 MG: 30 TABLET, FILM COATED, EXTENDED RELEASE ORAL at 16:22

## 2024-01-29 RX ADMIN — LABETALOL HYDROCHLORIDE 200 MG: 100 TABLET, FILM COATED ORAL at 19:43

## 2024-01-29 RX ADMIN — LABETALOL HYDROCHLORIDE 40 MG: 5 INJECTION, SOLUTION INTRAVENOUS at 19:40

## 2024-01-29 RX ADMIN — NIFEDIPINE 60 MG: 60 TABLET, FILM COATED, EXTENDED RELEASE ORAL at 06:44

## 2024-01-29 RX ADMIN — LABETALOL HYDROCHLORIDE 20 MG: 5 INJECTION INTRAVENOUS at 19:17

## 2024-01-29 RX ADMIN — NIFEDIPINE 30 MG: 30 TABLET, FILM COATED, EXTENDED RELEASE ORAL at 12:38

## 2024-01-29 RX ADMIN — LABETALOL HYDROCHLORIDE 200 MG: 100 TABLET, FILM COATED ORAL at 22:33

## 2024-01-29 RX ADMIN — LABETALOL HYDROCHLORIDE 40 MG: 5 INJECTION INTRAVENOUS at 13:24

## 2024-01-29 ASSESSMENT — PAIN SCALES - GENERAL

## 2024-01-29 ASSESSMENT — PAIN - FUNCTIONAL ASSESSMENT
PAIN_FUNCTIONAL_ASSESSMENT: 0-10

## 2024-01-29 NOTE — PROGRESS NOTES
Antepartum Progress Note    Assessment/Plan   Emelyn Perez is a 34yo  @ 24w1d by IVF dating admitted for sPEC    sPEC  - Diagnosed by severe range BP requiring IV tx  - Brought down to L&D this PM for severe range BP requiring further IR txt. Asx. Defer Mg at this time given BP control now improving and asx  - Most recent IR txt: lab 20/40, hydral 5 ( @ 1620)  - Current BP regimen: nifed 60 daily () -> increased to 60 BID ()  - HELLP labs neg x2 on admission, P:C 1.43. HELLP labs neg this AM  - Continue twice weekly HELLP labs (Mon/Thurs) while inpatient  - S/p 12hr Mag gtt on admission     Fetal well-being, FGR  - Growth US  EFW 526g 4%, AC 33%. Normal dopplers  - Short long bones noted on US , for genetics eval  - For twice weekly BPP and Dopplers  - CEFM while on L&D, cat 1 currently  - Daily NSTs on antepartum  - S/p BMZ -  - Marginal cord insertion  - NICU consulted and seen      IUP  - GBS negative   - Consider early Tdap given increased likelihood of PTD  - 1hr GTT not yet completed, POCT BG checks wnl in s/o BMZ  - PPBC: undecided    Seen and d/w Dr. Sharda Hernandez MD  PGY-3, Obstetrics and Gynecology    Subjective   No HA, vision changes, SOB, CP, RUQ pain, LE edema    Objective   Allergies:   Patient has no known allergies.    Last Vitals:  Temp Pulse Resp BP MAP Pulse Ox   36.8 °C (98.2 °F) 75 18 (!) 153/87   98 %     Vitals Min/Max Last 24 Hours:  Temp  Min: 36.8 °C (98.2 °F)  Max: 37.1 °C (98.8 °F)  Pulse  Min: 55  Max: 79  Resp  Min: 18  Max: 20  BP  Min: 119/65  Max: 169/92    Intake/Output:   No intake or output data in the 24 hours ending 24 1830    Physical Exam:  Physical Exam  Constitutional:       Appearance: Normal appearance.   HENT:      Head: Normocephalic and atraumatic.      Nose: Nose normal.      Mouth/Throat:      Mouth: Mucous membranes are moist.   Eyes:      Extraocular Movements: Extraocular movements intact.    Pulmonary:      Effort: Pulmonary effort is normal.   Musculoskeletal:         General: Normal range of motion.   Skin:     General: Skin is warm and dry.   Neurological:      General: No focal deficit present.      Mental Status: She is alert and oriented to person, place, and time.   Psychiatric:         Mood and Affect: Mood normal.         Behavior: Behavior normal.       Fetal Assessment  Baseline Fetal Heart Rate (bpm): 145 bpm  Baseline Classification: Normal  Variability: Moderate (Between 6 and 25 BPM)  Pattern: Accelerations  FHR Category: Category I     Contraction Frequency: none    Lab Data:  Lab Results   Component Value Date    WBC 16.2 (H) 01/29/2024    HGB 13.4 01/29/2024    HCT 40.7 01/29/2024     01/29/2024     Lab Results   Component Value Date    GLUCOSE 66 (L) 01/29/2024     01/29/2024    K 4.1 01/29/2024     01/29/2024    CO2 22 01/29/2024    ANIONGAP 14 01/29/2024    BUN 18 01/29/2024    CREATININE 0.66 01/29/2024    EGFR >90 01/29/2024    CALCIUM 9.0 01/29/2024    ALBUMIN 3.6 01/29/2024    PROT 6.1 (L) 01/29/2024    ALKPHOS 40 01/29/2024    ALT 16 01/29/2024    AST 16 01/29/2024    BILITOT 0.3 01/29/2024

## 2024-01-29 NOTE — PROGRESS NOTES
"ANTEPARTUM PROGRESS NOTE   2024, 11:23 AM     SUBJECTIVE: No acute events overnight. Patient has no complaints this morning. Denies painful contractions, VB, LOF. Reports normal fetal movement.   Denies HA, vision changes, CP, SOB, RUQ or epigastric pain    OBJECTIVE:   BP (!) 157/89   Pulse 70   Temp 37.1 °C (98.8 °F) (Temporal)   Resp 18   Ht 1.702 m (5' 7\")   Wt 78.2 kg (172 lb 6.4 oz)   LMP 2023   SpO2 99%   BMI 27.00 kg/m²    Temp  Min: 36.9 °C (98.4 °F)  Max: 37.5 °C (99.5 °F)  Pulse  Min: 68  Max: 79  BP  Min: 119/65  Max: 157/89    General:  AAOx3, No acute distress  Cardiovascular: Warm and well perfused  Respiratory: Normal respiratory effort     NST: Baseline 140/ + accels/ - decels/ mod anjana  [place]: quiet     Labs:   Lab Results   Component Value Date    WBC 16.2 (H) 2024    HGB 13.4 2024    HCT 40.7 2024     2024     Lab Results   Component Value Date    GLUCOSE 66 (L) 2024     2024    K 4.1 2024     2024    CO2 22 2024    ANIONGAP 14 2024    BUN 18 2024    CREATININE 0.66 2024    EGFR >90 2024    CALCIUM 9.0 2024    ALBUMIN 3.6 2024    PROT 6.1 (L) 2024    ALKPHOS 40 2024    ALT 16 2024    AST 16 2024    BILITOT 0.3 2024       ASSESSMENT AND PLAN:   32yo  @ 24w1d by IVF dating presenting as transfer for severe range BP.        sPEC  - Diagnosed by severe range BP requiring IV tx, has also had severe ranges >4 hrs apart  - S/p IV lab  at [facility]   - HELLP labs were negative x2, P:C 1.43  - For Twice weekly HELLP labs (Mon/Thurs) while inpatient  - Asymptomatic this AM  -s/p 12hr Mag gtt on admission  - Current BP Regimen: nifed 60 daily ()  - Continued recommendation for inpatient monitoring due to sPEC, and recommendation for delivery at 34.0 wga at the latest. Today we re-addressed indications for delivery before 34wks including but " not limited to rapid uptitration of antihypertensives and for concern for fetal wellbeing in the setting of now known FGR.     FGR  - EFW (1/25): 526g (4%ile), normal Dopplers  - Twice weekly BPP and Dopplers     Fetal status  - AGA tracing this AM  - BMZ 1/26-1/27  - Marginal insertion of umbilical cord, last fetal biometry low-normal with EFW 10% on 12/26, follow up US 1/12 pending final read. Bedside growth scan on admission with EFW 537g, 11%. Formal US with EFW 4%, AC 33%, Long Bones <1%. Dopplers wnl. Discussed plan for twice weekly dopplers moving forward to monitor fetal status closely.  - Breech presentation 1/29 US, re-scan if headed toward delivery.  - NICU consulted and aware, to see pt today (1/29)     Routine:  - GBS negative 1/27  - TDAP: will offer today given increased likelihood of PTD.  - Flu 10/23  - 1hr: not yet completed, POCT BG checks wnl in the s/o BMZ  - BCM: undecided     Dispo: Continued inpatient BP management and observation.    D/w Dr. Michael Bustamante MD PGY-3  [facility] Pager [phone]  Phone: [phone]     Principal Problem:    Severe pre-eclampsia in second trimester  Active Problems:    Poor fetal growth affecting management of mother in second trimester

## 2024-01-29 NOTE — SIGNIFICANT EVENT
BP Cuff and Home Monitoring  Patient will obtain her cuff on [employer].  Please check your blood pressure twice a day; when you're resting, sitting upright, feet flat on the floor, in left arm resting on a table. If the top number is greater than 160 and/or the bottom number is greater than 110, call your OB provider immediately or come to the nearest emergency room. If your blood pressure is between 150/100 and 160/110, rest for 1 hour and then recheck you blood pressure. If on recheck it is still between 150/100 and 160/110, call your OB provider or go to the nearest emergency room.

## 2024-01-29 NOTE — CONSULTS
"Reason For Consult  24 1/7 weeks with sPEC and FGR    NICU PRENATAL CONSULT NOTE  Emelyn Perez is a 33 y.o. .  She has been admitted for management of her sPEC and for fetal growth monitoring.  This is an IVF pregnancy    Chief Complaint: Hypertension    Emelyn Perez is a 33 y.o.  with AGUILAR 2024, by Ultrasound presenting with high BP.    Requesting Physician/Service:  OB- Dr Stafford  Consulting Physician/Service: DENISE - DANIELLA Peterson    Reason for Consultation: 24 weeks 1d with sPEC    Pregnancy Complications: None up until the high BP  IVF, marginal insertion of cord    Prenatal labs:   Lab Results   Component Value Date    ABO O 2024    LABRH POS 2024    ABSCRN NEG 2024    RUBIG Positive 10/27/2023     Toxicology:   No results found for: \"AMPHETAMINE\", \"MAMPHBLDS\", \"BARBITURATE\", \"BARBSCRNUR\", \"BENZODIAZ\", \"BENZO\", \"BUPRENBLDS\", \"CANNABBLDS\", \"CANNABINOID\", \"COCBLDS\", \"COCAI\", \"METHABLDS\", \"METH\", \"OXYBLDS\", \"OXYCODONE\", \"PCPBLDS\", \"PCP\", \"OPIATBLDS\", \"OPIATE\", \"FENTANYL\", \"DRBLDCOMM\"  Labs:  Lab Results   Component Value Date    GRPBSTREP No Group B Streptococcus (GBS) isolated 2024    HIV1X2 Nonreactive 10/27/2023    HEPBSAG Nonreactive 10/27/2023    HEPCAB Nonreactive 10/27/2023    NEISSGONOAMP Negative 2023    CHLAMTRACAMP Negative 2023    SYPHT Nonreactive 2024     Fetal Imaging:  === Results for orders placed in visit on 23 ===    US OB < 14 weeks early [[id number]] 2023    Status: Normal    Medical History  She has a past medical history of [place] product of in vitro fertilization (IVF) pregnancy and Personal history of other diseases of the respiratory system (2020).     Family History  Family History   Problem Relation Name Age of Onset    No Known Problems Mother      No Known Problems Father          Social History  She reports that she has never smoked. She has never been exposed to tobacco smoke. She has never used smokeless tobacco. " She reports that she does not drink alcohol and does not use drugs.      Assessment/Recommendations:  Met with Ms Perez and her  and they were very receptive to have a discussion.  They are having a son.  She has received 2 doses of Betamethasone  Resuscitation and ICN care plans and expectations for hospital course were discussed including:  RDS/BPD/Ventilator, IVH/Developmental outcome, OG feedings/NEC/use of breastmilk , risk of infection, expected length of stay, and apnea/monitoring,      1. I discussed the predicted survival (50-60%) and neurodevelopment impairment risk of an infant at 24 weeks gestation.  I explained resuscitation teams’ presence at delivery and our plan of care.    2. Discussed possible short term morbidities including:   RDS, possible intubation, surfactant.   IVH   Infection.   Need for vascular access and obtained verbal permission.   Duration of hospitalization.    3. I strongly encouraged the mother to provide breast milk to the infant. Discussed use of donor milk and she is agreeable to using donor breast milk.    4.         Discussed with parents plan may need to be modified after the baby has been born and examined.    Encouraged them to tour the NICU when clinically stable  Will be happy to return if they have more questions.      Patient made aware that Neonatology will be present for delivery and that we remain available for any questions/concerns that might arise. Thank you for the consult.    Electronically signed by:  Michelle Peterson MD     I spent 60 minutes for the consult with at least half the time being face to face with the patient.

## 2024-01-30 PROCEDURE — 59025 FETAL NON-STRESS TEST: CPT | Mod: GC

## 2024-01-30 PROCEDURE — 1210000001 HC SEMI-PRIVATE ROOM DAILY

## 2024-01-30 PROCEDURE — 99418 PROLNG IP/OBS E/M EA 15 MIN: CPT | Performed by: STUDENT IN AN ORGANIZED HEALTH CARE EDUCATION/TRAINING PROGRAM

## 2024-01-30 PROCEDURE — 99232 SBSQ HOSP IP/OBS MODERATE 35: CPT

## 2024-01-30 PROCEDURE — 2500000004 HC RX 250 GENERAL PHARMACY W/ HCPCS (ALT 636 FOR OP/ED)

## 2024-01-30 PROCEDURE — 99233 SBSQ HOSP IP/OBS HIGH 50: CPT | Performed by: STUDENT IN AN ORGANIZED HEALTH CARE EDUCATION/TRAINING PROGRAM

## 2024-01-30 PROCEDURE — 2500000001 HC RX 250 WO HCPCS SELF ADMINISTERED DRUGS (ALT 637 FOR MEDICARE OP)

## 2024-01-30 PROCEDURE — 59025 FETAL NON-STRESS TEST: CPT

## 2024-01-30 RX ADMIN — NIFEDIPINE 60 MG: 60 TABLET, EXTENDED RELEASE ORAL at 21:54

## 2024-01-30 RX ADMIN — NIFEDIPINE 60 MG: 60 TABLET, EXTENDED RELEASE ORAL at 09:02

## 2024-01-30 RX ADMIN — LABETALOL HYDROCHLORIDE 400 MG: 100 TABLET, FILM COATED ORAL at 20:09

## 2024-01-30 RX ADMIN — LABETALOL HYDROCHLORIDE 400 MG: 100 TABLET, FILM COATED ORAL at 07:46

## 2024-01-30 ASSESSMENT — PAIN SCALES - GENERAL
PAINLEVEL_OUTOF10: 0 - NO PAIN

## 2024-01-30 ASSESSMENT — PAIN - FUNCTIONAL ASSESSMENT
PAIN_FUNCTIONAL_ASSESSMENT: 0-10
PAIN_FUNCTIONAL_ASSESSMENT: 0-10

## 2024-01-30 NOTE — CARE PLAN
Problem: Antepartum  Goal: FHR remains reassuring  Outcome: Progressing  Goal: Minimize anxiety/maximize coping  Outcome: Progressing     Problem: Hypertensive Disorder of Pregnancy (HDP)  Goal: Minimal s/sx of HDP and BP<160/110  Outcome: Progressing  Pt was stable this shift. She had no s/sx of HDP. Her VS were stable. She had no cramping or contractions

## 2024-01-30 NOTE — SIGNIFICANT EVENT
"Update    Subjective: To bedside to eval pt in setting of recent severe range BP. Genetics in room talking with patient. Pt reports anxiety somewhat improved with atarax. No HA, vision changes, SOB, CP     Objective:  Vitals: BP (!) 161/84 Comment: 15x2  Pulse 62   Temp 36.7 °C (98.1 °F) (Temporal)   Resp 18   Ht 1.702 m (5' 7\")   Wt 78.2 kg (172 lb 6.4 oz)   LMP 03/14/2023   SpO2 98%   BMI 27.00 kg/m²     Fetal Assessment  Baseline Fetal Heart Rate (bpm): 140 bpm  Baseline Classification: Normal  Variability: Moderate (Between 6 and 25 BPM)  Pattern: Accelerations  FHR Category: Category I     Contraction Frequency: none    Assessment/Plan:  sPEC  - Diagnosed by severe range BP requiring IV tx  - Most recent IR txt: lab 20/40, hydral 5, lab 20 (1/29 @ 1900)  - Current BP regimen: nifed 60 daily (1/27) -> increased to 60 BID (1/29) + lab 200 BID -> 400 BID (1/29 PM). Rapidly uptitrated since this afternoon  - HELLP labs neg x2 on admission, P:C 1.43. HELLP labs neg 1/29 AM & PM  - S/p 12hr Mag gtt on admission. Discussed restarting Mg with Dr. Rodriguez - restart if trouble controlling BP, defer at this time    Dispo: continue to monitor on L&D until 4hrs without severe range BP    D/w Dr. Danielle and Dr. Sharda Hernandez MD  PGY-3, Obstetrics and Gynecology    "

## 2024-01-30 NOTE — CONSULTS
Basic info:  Emelyn Perez (Legal Name)    Pronouns: she/her/hers   33 y.o., 1990   Gender identity: Female   Legal sex: Female   Sex assigned at birth: Female   Marital status:    Race: White   Ethnicity: Not , /a, or Tajik origin   Languages   English   [address]  [facility] [address]   Employer: [employer]   Occupation: PHYSICAL THERAPIST ASST   MRN: [id number]        Requesting physician/service:  Reason for the visit / consultation:      Referred by: MFDOREEN  For: abnormal ultrasound: shortened long bone    History of Present Illness:      Emelyn Perez is a 33 y.o.  woman at 24w1d admitted for preeclampsia with severe features and FGR in an IVF pregnancy. Now normotensive to mild range on Nifedipine 60mg every day.     Growth US :   - EFW 526g 4%, AC 33%. Normal dopplers  - Short long bones noted on US   - Marginal cord insertion  Formal US with EFW 4%, AC 33%, Long Bones <1%. Dopplers wnl. Discussed plan for twice weekly dopplers moving forward to monitor fetal status closely.  - Breech presentation  US, re-scan if headed toward delivery.    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 Emelyn has since had three chemical pregnancies, and no live births     had negative carrier screening, patient herself did not have carrier screening    The fetus was conceived by IVF + PGT.   Preimplantation Genetic Testing (PGT-A?, PGT-M?, and PGT-SR) was performed through [facility], and selected for implantation.      Review of Systems: (for fetus)    Constitutional: FGR  Head and Neck: No concerns reported  Eyes:  No concerns reported  ENT:  No concerns reported  Respiratory:  No concerns reported  Cardiovascular: No concerns reported  Gastrointestinal:  No concerns reported  Genitourinary:  " No concerns reported  Reproductive/Endocrine:  No concerns reported  Musculoskeletal:  Shortened long bone <1%  Hematology/Lymphatic:  No concerns reported  Skin: No concerns reported  Neurological:  No concerns reported  Psychiatric: No concerns reported       Pertinent positive and negative ROS are listed in HPI, PMH and above, otherwise reviewed in detail for 15 systems and negative      Birth History: (for fetus)   Conceived by IVF  Mom 33 years old,     - GBS negative   - TDAP: will be offered by MFM given increased likelihood of PTD.  - Flu 10/23  - 1hr: not yet completed, POCT BG checks wnl in the s/o BMZ  - BCM: undecided    The - GBS negative   - TDAP: will be offered by MFM given increased likelihood of PTD.  - Flu 10/23  - 1hr: not yet completed, POCT BG checks wnl in the s/o BMZ    The pregnancy was complicated by sPEC-severe feature  Prenatal ultrasound was abnormal with FGR, shortened log bone    Developmental History:      n/a    Past Medical History:      n/a    Past Surgical History:      n/a    Allergies:   n/a    Immunizations:    n/a    Medications:      Family History:    (Ref: scanned pedigree in the EMR)    For proper prenatal genetics evaluation and counseling, the following family history is documented from the perspectives of the fetus (instead of the mother)      Social History:    Occupation: Physical therapist  Smoke? No            Examination:       Basic Vital Sign Results:  Blood pressure (!) 141/79, pulse 62, temperature 36.7 °C (98.1 °F), temperature source Temporal, resp. rate 18, height 1.702 m (5' 7\"), weight 78.2 kg (172 lb 6.4 oz), last menstrual period 2023, SpO2 98 %.    Physical Exam:   General: No distress. Spontaneous movements. Appears well-developed and well-nourished.   Skin: No apparent rashes or jaundice  Hair: No abnormal distribution   Head Shape: normocephalic, atraumatic      Face:  no asymmetry for facial expression  Eyes:  No hypertelorism, no " periorbital edema, no epicanthal folds. No discharge or swelling. No scleral icterus or injection.   Ears:  set and rotation within normal limit  Nose: no apparent dysmorphology found  Mouth: no microretrognathia  Neck:  no webbing   Chest: No respiratory distress.   Heart: No cyanosis  Extremities:  No evidence of joint contractures or hypermobility.   Neurological: Good head control. Spontaneous movements of arms. No evidence of spasticity. There were no involuntary movements or tremor.  Psychiatric: Alert and awake. Oriented to person, place, and time. Normal mood and affect. Speech is normal and behavior is normal. Judgment and thought content normal.      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 this is a common chromosomal variant that is present in approximately 1% of the population and is believed to be of no clinical signi?cance.    Final Report date: 2023  PREIMPLANTATION GENETIC TESTING FOR STRUCTURAL    REARRANGEMENTS (PGT-SR)        Diagnostic Imagin-01-29 OB Ultrasound:   Study Result    Narrative & Impression  Interpreted by: Edenilson Rodriguez  Indication  ========     Suspected Problem with Fetal Growth, Intrauterine Growth Restriction, Hypertension, Superimposed Pre-eclampsia, Inpatient, Mac 4  History  ======     General History  Smoking:    no  Height      170 cm  Height (ft) 5 ft  Height (in) 7 in  Previous Outcomes       3  Para  0  Abortions (A)     2  Miscarriages      2  Maternal Assessment  =================     Height      170 cm  Height (ft) 5 ft  Height (in) 7 in  Weight      68 kg  Weight (lb) 151 lb  Weight gain 0 kg  Weight gain (lb)  0 lb  BMI   23.65 kg/mï¿½  Physical Exam  Initial weight (lb)     151 lb  Pregnancy  =========     Chavez pregnancy. Number of fetuses: 1  Dating  ======     Conception: IVF Embryo Transfer  Embryo transfer on:      9/1/2023  IVF / ET    5 d  GA by IVF / ET    24 w + 1 d  AGUILAR by IVF / ET:  5/19/2024  Ultrasound examination on:    1/29/2024  GA by U/S based upon:   AC, BPD, Femur, HC  GA by U/S   23 w + 0 d  AGUILAR by U/S: 5/27/2024  Assigned:   based on the IVF / ET date, selected on 09/29/2023  Assigned GA 24 w + 1 d  Assigned AGUILAR:     5/19/2024  Fetal Growth Overview  =================     Exam date        GA              BPD (mm)          HC (mm)              AC (mm)               FL (mm)             HL (mm)             EFW (g)  12/26/2023        19w 2d        45.1     66%        157.8    16%        130.5     23%        26.7    10%         26.7    8%          240    10%  01/29/2024        24w 1d        57.7     26%        212.5    9%          191.7    33%         34.5    <1%        34.9    <1%         526    4%  Impression  =========     Patient here for growth ultrasound in a pregnancy complicated by IVF conception and preeclampsia with severe features     - Fetal biometry is consistent with growth restriction with EFW at 4%ile and AC at 33%ile  - Long bones are short though only femur and humerus are >2SD from the mean. Femur:foot ratio is 0.9 which is normal. There is no bowing or fracturing noted. The bone  echotexture is normal  - Normal amniotic fluid, NST AGA this AM  - Normal UA Doppler indices  Rhizomelic limb shortening is noted. Will address inpatient.  General Evaluation  ==============     Cardiac activity present.  bpm. Fetal movements: visualized. Presentation: cephalic  Placenta: Placental site: posterior  Umbilical cord: Cord vessels: 3 vessel cord. Insertion site: marginal insertion  Amniotic fluid: Amount of AF: normal amount. MVP 5.8 cm. GRACIELA 18.4 cm. Q1 3.8 cm, Q2 5.3 cm, Q3 5.8 cm, Q4 3.6 cm  Fetal Biometry  ============     Standard  BPD   57.7 mm     23w 5d 26% Hadlock  OFD   74.7 mm      17% INTERGROWTH-21st  HC    212.5 mm    23w 2d 9% Hadlock  Cerebellum tr     25.9 mm     23w 4d 46% Griffin  AC     191.7 mm    23w 6d 33% Hadlock  Femur 34.5 mm     20w 6d <1% Hadlock  Humerus     34.9 mm      <1% Chitty  HC / AC     1.11  EFW   526 g 22w 4d 4% Hadlock  EFW (lb)    1 lb  EFW (oz)    3 oz  EFW by:     Hadlock (BPD-HC-AC-FL)  Extended  Tibia 32.3 mm     21w 3d <1% Chitty  Fibula      31.3 mm     21w 2d <1% Chitty      8.6 mm  Head / Face / Neck  Cephalic index    0.77   36% Nicolaides  Extremities / Bony Struc  Rt Radius   29.4 mm      <1% Chitty  Rt Ulna     33.8 mm     21w 5d 1% Chitty  FL / BPD    0.60  FL / HC     0.16  FL / AC     0.18  Other Structures  FHR   146 bpm  Fetal Anatomy  ===========     Cranium:    Normal  Lateral ventricles:     Normal  Midline falx:     Normal  Cavum septi pellucidi:  Normal  Cerebellum: Normal  Cisterna magna:   Normal  Head / Neck  Rt lateral ventricle:   Normal  Lt lateral ventricle:   Normal  Thalami:    Normal  Cerebellar lobes: Normal  Lips: Normal  Profile:    Normal  Nose: Normal  4-chamber view:   Normal  RVOT view:  suboptimal  LVOT view:  suboptimal  3-vessel view:    Normal  Heart / Thorax  Cardiac axis:     Normal  Diaphragm:  Normal  Stomach:    Normal  Kidneys:    Normal  Bladder:    visualized  Abdomen  Stomach:    correct situs  Rt kidney:  Normal  Lt kidney:  Normal  Large bowel:      Normal  Arms: Both Upper Extremities Seen  Legs: Both Lower Extremities Seen  Fetal sex:  male  Wants to know fetal sex:      yes  Fetal Doppler  ===========     Arterial  Umbilical A PI    1.48   82% Bruno  Umbilical A RI    0.79   79% Bruno  Maternal Structures  ===============     Uterus / Cervix  Uterus:     Normal  Ovaries / Tubes / Adnexa  Rt ovary:   Not visualized  Lt ovary:   Not visualized  Method  ======     Transabdominal ultrasound examination. View: Suboptimal view: limited by late gestational age  Indication  ========     Suspected Problem with Fetal Growth, Intrauterine Growth Restriction, Hypertension, Superimposed Pre-eclampsia, Inpatient, Mac  4  History  ======     General History  Smoking:    no  Height      170 cm  Height (ft) 5 ft  Height (in) 7 in  Previous Outcomes       3  Para  0  Abortions (A)     2  Miscarriages      2  Maternal Assessment  =================     Height      170 cm  Height (ft) 5 ft  Height (in) 7 in  Weight      68 kg  Weight (lb) 151 lb  Weight gain 0 kg  Weight gain (lb)  0 lb  BMI   23.65 kg/mï¿½  Physical Exam  Initial weight (lb)     151 lb  Pregnancy  =========     Chavez pregnancy. Number of fetuses: 1  Dating  ======     Conception: IVF Embryo Transfer  Embryo transfer on:     2023  IVF / ET    5 d  GA by IVF / ET    24 w + 1 d  AGUILAR by IVF / ET:  2024  Ultrasound examination on:    2024  GA by U/S based upon:   AC, BPD, Femur, HC  GA by U/S   23 w + 0 d  AGUILAR by U/S: 2024  Assigned:   based on the IVF / ET date, selected on 2023  Assigned GA 24 w + 1 d  Assigned AGUILAR:     2024  Fetal Growth Overview  =================     Exam date        GA              BPD (mm)          HC (mm)              AC (mm)               FL (mm)             HL (mm)             EFW (g)  2023        19w 2d        45.1     66%        157.8    16%        130.5     23%        26.7    10%         26.7    8%          240    10%  2024        24w 1d        57.7     26%        212.5    9%          191.7    33%         34.5    <1%        34.9    <1%         526    4%  Impression  =========     Patient here for growth ultrasound in a pregnancy complicated by IVF conception and preeclampsia with severe features     - Fetal biometry is consistent with growth restriction with EFW at 4%ile and AC at 33%ile  - Long bones are short though only femur and humerus are >2SD from the mean. Femur:foot ratio is 0.9 which is normal. There is no bowing or fracturing noted. The bone  echotexture is normal  - Normal amniotic fluid, NST AGA this AM  - Normal UA Doppler indices  Rhizomelic limb shortening is noted. Will  address inpatient.  General Evaluation  ==============     Cardiac activity present.  bpm. Fetal movements: visualized. Presentation: cephalic  Placenta: Placental site: posterior  Umbilical cord: Cord vessels: 3 vessel cord. Insertion site: marginal insertion  Amniotic fluid: Amount of AF: normal amount. MVP 5.8 cm. GRACIEAL 18.4 cm. Q1 3.8 cm, Q2 5.3 cm, Q3 5.8 cm, Q4 3.6 cm  Fetal Biometry  ============     Standard  BPD   57.7 mm     23w 5d 26% Hadlock  OFD   74.7 mm      17% INTERGROWTH-21st  HC    212.5 mm    23w 2d 9% Hadlock  Cerebellum tr     25.9 mm     23w 4d 46% Griffin  AC    191.7 mm    23w 6d 33% Hadlock  Femur 34.5 mm     20w 6d <1% Hadlock  Humerus     34.9 mm      <1% Chitty  HC / AC     1.11  EFW   526 g 22w 4d 4% Hadlock  EFW (lb)    1 lb  EFW (oz)    3 oz  EFW by:     Hadlock (BPD-HC-AC-FL)  Extended  Tibia 32.3 mm     21w 3d <1% Chitty  Fibula      31.3 mm     21w 2d <1% Chitty      8.6 mm  Head / Face / Neck  Cephalic index    0.77   36% Nicolaides  Extremities / Bony Struc  Rt Radius   29.4 mm      <1% Chitty  Rt Ulna     33.8 mm     21w 5d 1% Chitty  FL / BPD    0.60  FL / HC     0.16  FL / AC     0.18  Other Structures  FHR   146 bpm  Fetal Anatomy  ===========     Cranium:    Normal  Lateral ventricles:     Normal  Midline falx:     Normal  Cavum septi pellucidi:  Normal  Cerebellum: Normal  Cisterna magna:   Normal  Head / Neck  Rt lateral ventricle:   Normal  Lt lateral ventricle:   Normal  Thalami:    Normal  Cerebellar lobes: Normal  Lips: Normal  Profile:    Normal  Nose: Normal  4-chamber view:   Normal  RVOT view:  suboptimal  LVOT view:  suboptimal  3-vessel view:    Normal  Heart / Thorax  Cardiac axis:     Normal  Diaphragm:  Normal  Stomach:    Normal  Kidneys:    Normal  Bladder:    visualized  Abdomen  Stomach:    correct situs  Rt kidney:  Normal  Lt kidney:  Normal  Large bowel:      Normal  Arms: Both Upper Extremities Seen  Legs: Both Lower Extremities Seen  Fetal sex:   male  Wants to know fetal sex:      yes  Fetal Doppler  ===========     Arterial  Umbilical A PI    1.48   82% Bruno  Umbilical A RI    0.79   79% Bruno  Maternal Structures  ===============     Uterus / Cervix  Uterus:     Normal  Ovaries / Tubes / Adnexa  Rt ovary:   Not visualized  Lt ovary:   Not visualized  Method  ======     Transabdominal ultrasound examination. View: Suboptimal view: limited by late gestational age        Assessment & Recommendations:     Emelyn Perez is a 33 y.o. woman with:   -- preeclampsia with severe features  -- IVF pregnancy  at 24w1d   -- FGR:  EFW 526g 4%, AC 33%. 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   -- Preimplantation Genetic Testing (PGT-A?, PGT-M?, and PGT-SR) was performed through [email or web address], and selected for implantation. --> Only PGT-SR was performed    We discussed short femur may indicate/ be a feature of:   a small normal baby   IUGR   a chromosomal disorder   a skeletal dysplasia   a malformation syndrome with IUGR or limb reduction as one of the major features    There may be ascertainment bias and f/u is needed to draw a conclusion.    Recommendations:     -- The finding could be a small normal baby, FGR, a chromosomal abnormality, or skeletal dysplasia  -- We discussed a few potential options for genetic workups   1. Prenatal:   ---- cfDNA for common aneuploidy: Chr 13,18, 21, X  ---- Amniocentesis for karyotyping, CMA, genetic panel, or even RONEL  2. :   ---- Cord blood for karyotyping, CMA, genetic panel, or even RONEL  ---- Fetal blood for karyotyping, CMA, genetic panel, or even RONEL    The patient understands the options and will think about them.         Attending Physician Statement:        The history was reviewed with the family and is detailed above. I performed a physical examination which is documented  above. The impression and plan were reviewed with the patient/family extensively and are documented above. I have reviewed and edited the above note. Greater than 50% of the time was spent on patient counseling and coordination of care.      Prep 40 minutes  Face to face 37 minutes  Additional Patient Care Activities 22 minutes  Documentation 46 minutes  Other 0 minutes  Total 145 minutes    --  Dave Childress MD, PhD  Director, Urogenetics Program, Department of Genetics and Genome Sciences  Chief, Urogenetics Division, [facility]   and Attending in Genetics and Urology  [facility], and [facility]

## 2024-01-30 NOTE — PROGRESS NOTES
"ANTEPARTUM PROGRESS NOTE   2024, 10:56 AM     SUBJECTIVE: Was transferred to L&D yesterday due to severe BP, was treated with IV lab 20. Uptitrated to nifed 60 BID and Lab 400 BID. Transferred back to mac4 after 4 hrs obs since last severe range BP. Patient has no complaints this morning. Denies painful contractions, VB, LOF. Reports normal fetal movement.   Denies HA, vision changes, CP, SOB, RUQ or epigastric pain.    OBJECTIVE:   /78   Pulse 77   Temp 36.9 °C (98.4 °F) (Temporal)   Resp 16   Ht 1.702 m (5' 7\")   Wt 78.2 kg (172 lb 6.4 oz)   LMP 2023   SpO2 97%   BMI 27.00 kg/m²    Temp  Min: 36.7 °C (98.1 °F)  Max: 37 °C (98.6 °F)  Pulse  Min: 55  Max: 77  BP  Min: 130/78  Max: 169/92    General:  AAOx3, No acute distress  Cardiovascular: Warm and well perfused, RRR  Respiratory: Normal respiratory effort, CTAB  Abdominal:  Soft, gravid, non-tender, no rebound or guarding    NST: Baseline 135/ pos 10x10 accels/ no decels/ mod anjana  [place]: acontractile     Labs:   Lab Results   Component Value Date    WBC 16.5 (H) 2024    HGB 12.8 2024    HCT 37.3 2024     2024     Lab Results   Component Value Date    GLUCOSE 80 2024     2024    K 4.4 2024     2024    CO2 21 2024    ANIONGAP 13 2024    BUN 16 2024    CREATININE 0.54 2024    EGFR >90 2024    CALCIUM 9.0 2024    ALBUMIN 3.3 (L) 2024    PROT 5.7 (L) 2024    ALKPHOS 42 2024    ALT 24 2024    AST 22 2024    BILITOT 0.3 2024     No results found for: \"UTPCR\"    ASSESSMENT AND PLAN:     34yo  @ 24w2d by IVF dating admitted as transfer for severe range BP, found to have sPEC.      sPEC  - Diagnosed by severe range BP requiring IV tx, has also had severe ranges >4 hrs apart  - S/p IV lab  at [facility] ; last IV tx here  pm IV lab 20  - HELLP labs negative over multiple sets with last , P:C 1.43  - " For twice weekly HELLP labs (Mon/Thurs) while inpatient if BP & symptoms stable  - Asymptomatic this AM  - s/p 12hr Mag gtt on admission  - Current BP Regimen: nifed 60 BID + Lab 400 BID  - Continued recommendation for inpatient monitoring due to sPEC, and recommendation for delivery at 34.0 wga at the latest, however would deliver before 34wks for indications including but not limited to rapid uptitration of antihypertensives and concern for fetal wellbeing in the setting of now known FGR.      FGR, rhizomelic shortening of long bones  - EFW (1/25): 526g (4%ile), normal Dopplers  - Twice weekly BPP and Dopplers (Mon/Thurs)  - Genetics consulted, will fu recs     Fetal status  - AGA tracing this AM  - BMZ 1/26-1/27  - Marginal insertion of umbilical cord, last fetal biometry low-normal with EFW 10% on 12/26, follow up US 1/12 pending final read. Formal US with EFW 4%, AC 33%, Long Bones <1%. Dopplers wnl. Discussed plan for twice weekly dopplers moving forward to monitor fetal status closely.  - Breech presentation 1/29 US, re-scan if headed toward delivery.  - s/p NICU cs 1/29     Routine:  - GBS negative 1/27  - TDAP: offered 1/29 given increased likelihood of PTD, not yet received, will fu today  - Flu 10/23  - 1hr: not yet completed, POCT BG checks wnl in the s/o BMZ  - BCM: undecided     Dispo: Continued inpatient BP management and observation for sPEC.    Pt seen and discussed with [facility] Attending, Dr. Rodriguez.   Domonique Garza MD PGY2  [facility]  Pager [phone]     Principal Problem:    Severe pre-eclampsia in second trimester  Active Problems:    Poor fetal growth affecting management of mother in second trimester

## 2024-01-30 NOTE — SIGNIFICANT EVENT
BPs normal-mild range over 4 hours observation on Nifedipine 60/60 and Labetalol 400/400. CEFM appropriate for gestational age. Ok for transfer to antepartum service for continued observation.    Discussed with Dr. Sharda Danielle MD  Labor and Delivery

## 2024-01-30 NOTE — CARE PLAN
Problem: Antepartum  Goal: FHR remains reassuring  Outcome: Progressing  Goal: Minimize anxiety/maximize coping  Outcome: Progressing     Problem: Hypertensive Disorder of Pregnancy (HDP)  Goal: Minimal s/sx of HDP and BP<160/110  Outcome: Progressing     Problem: Pain - Adult  Goal: Verbalizes/displays adequate comfort level or baseline comfort level  Outcome: Progressing     Problem: Safety - Adult  Goal: Free from fall injury  Outcome: Progressing   The patient's goals for the shift include stay pregnant    The clinical goals for the shift include BPs less than 160/110

## 2024-01-31 ENCOUNTER — APPOINTMENT (OUTPATIENT)
Dept: RADIOLOGY | Facility: HOSPITAL | Age: 34
End: 2024-01-31
Payer: COMMERCIAL

## 2024-01-31 PROCEDURE — 99233 SBSQ HOSP IP/OBS HIGH 50: CPT | Performed by: STUDENT IN AN ORGANIZED HEALTH CARE EDUCATION/TRAINING PROGRAM

## 2024-01-31 PROCEDURE — 76818 FETAL BIOPHYS PROFILE W/NST: CPT

## 2024-01-31 PROCEDURE — 76818 FETAL BIOPHYS PROFILE W/NST: CPT | Performed by: OBSTETRICS & GYNECOLOGY

## 2024-01-31 PROCEDURE — 76820 UMBILICAL ARTERY ECHO: CPT

## 2024-01-31 PROCEDURE — 2500000004 HC RX 250 GENERAL PHARMACY W/ HCPCS (ALT 636 FOR OP/ED)

## 2024-01-31 PROCEDURE — 2500000001 HC RX 250 WO HCPCS SELF ADMINISTERED DRUGS (ALT 637 FOR MEDICARE OP)

## 2024-01-31 PROCEDURE — 1210000001 HC SEMI-PRIVATE ROOM DAILY

## 2024-01-31 PROCEDURE — 76820 UMBILICAL ARTERY ECHO: CPT | Performed by: OBSTETRICS & GYNECOLOGY

## 2024-01-31 RX ORDER — ACETAMINOPHEN 325 MG/1
975 TABLET ORAL ONCE
Status: COMPLETED | OUTPATIENT
Start: 2024-01-31 | End: 2024-01-31

## 2024-01-31 RX ADMIN — ACETAMINOPHEN 975 MG: 325 TABLET ORAL at 07:01

## 2024-01-31 RX ADMIN — NIFEDIPINE 60 MG: 60 TABLET, EXTENDED RELEASE ORAL at 20:20

## 2024-01-31 RX ADMIN — NIFEDIPINE 60 MG: 60 TABLET, EXTENDED RELEASE ORAL at 08:28

## 2024-01-31 RX ADMIN — LABETALOL HYDROCHLORIDE 400 MG: 100 TABLET, FILM COATED ORAL at 08:28

## 2024-01-31 RX ADMIN — LABETALOL HYDROCHLORIDE 400 MG: 100 TABLET, FILM COATED ORAL at 20:20

## 2024-01-31 ASSESSMENT — PAIN SCALES - GENERAL
PAINLEVEL_OUTOF10: 3
PAINLEVEL_OUTOF10: 0 - NO PAIN
PAINLEVEL_OUTOF10: 0 - NO PAIN
PAINLEVEL_OUTOF10: 3
PAINLEVEL_OUTOF10: 0 - NO PAIN
PAINLEVEL_OUTOF10: 0 - NO PAIN

## 2024-01-31 ASSESSMENT — PAIN - FUNCTIONAL ASSESSMENT
PAIN_FUNCTIONAL_ASSESSMENT: 0-10

## 2024-01-31 ASSESSMENT — PAIN DESCRIPTION - LOCATION: LOCATION: HEAD

## 2024-01-31 NOTE — CARE PLAN
The patient's goals for the shift include stay pregnant    The clinical goals for the shift include BP <160/110    Over the shift, the patient VS and assessment findings remain stable. No OB complaints at this time. Pt resting comfortably in room. Denies needs at this time.

## 2024-01-31 NOTE — PROGRESS NOTES
Medical Record #: [id number]  Patient's Name: Emelyn Perez  YOB: 1990  Gender: female  Address:   [address] [place] [address]  Phone: [phone] (home) [phone] (work)     REFERRAL:  Social Work Assessment      Prenatal Care: adequate PNC  Barriers: none identified     [place] Name: N/A - not delivered yet   : N/A - not delivered yet     Other Children: N/A     FOB: Salvador Perez ([phone])    Household Composition: Resides with , Salvador Perez    Supports: Has many family members in the area who are supportive along with her spouse.     IPV/DV or Safety Concerns: Denies     Transportation Concerns: None identified. Has own vehicle.     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 Patient denies a history of depression, anxiety, or other psychiatric disorders.      Bonding: N/A - Pt has not yet delivered     Department of Children and Family Services (DCFS): Denies.     Assessment: The patient was admitted on 24 at 24w3d due to severe BP. This was an unexpected admission for the patient. She appears to be coping and is in good spirits despite the disappointment of being admitted.  She has strong support from family and friends. She also appears to have reasonable expectations regarding her possible length of stay and appears trusting of the medical team. While she hasn't experienced depression or anxiety before, this SW provided PMAD education and resources which the patient was open to receiving.      Plan: The patient denies any questions or needs at this time. This  let her know that the social work team is available should she need anything in the future. SW will continue to follow.       LUCY Herndon

## 2024-01-31 NOTE — PROGRESS NOTES
"ANTEPARTUM PROGRESS NOTE   2024, 7:38 AM     SUBJECTIVE:  Patient has no complaints this morning. Denies painful contractions, VB, LOF. Reports normal fetal movement.   Denies HA, vision changes, CP, SOB, RUQ or epigastric pain.    OBJECTIVE:   /78   Pulse 70   Temp 36.3 °C (97.3 °F) (Temporal)   Resp 17   Ht 1.702 m (5' 7\")   Wt 78.2 kg (172 lb 6.4 oz)   LMP 2023   SpO2 96%   BMI 27.00 kg/m²    Temp  Min: 36.3 °C (97.3 °F)  Max: 37 °C (98.6 °F)  Pulse  Min: 66  Max: 86  BP  Min: 108/60  Max: 150/88    General:  AAOx3, No acute distress  Cardiovascular: Warm and well perfused, RRR  Respiratory: Normal respiratory effort, CTAB  Abdominal:  Soft, gravid, non-tender, no rebound or guarding    NST: Baseline 140/mod/-accel/-decels, for BPP  [place]: acontractile     Labs:   Lab Results   Component Value Date    WBC 16.5 (H) 2024    HGB 12.8 2024    HCT 37.3 2024     2024     Lab Results   Component Value Date    GLUCOSE 80 2024     2024    K 4.4 2024     2024    CO2 21 2024    ANIONGAP 13 2024    BUN 16 2024    CREATININE 0.54 2024    EGFR >90 2024    CALCIUM 9.0 2024    ALBUMIN 3.3 (L) 2024    PROT 5.7 (L) 2024    ALKPHOS 42 2024    ALT 24 2024    AST 22 2024    BILITOT 0.3 2024     No results found for: \"UTPCR\"    ASSESSMENT AND PLAN:     34yo  @ 24w3d by IVF dating admitted as transfer for severe range BP, found to have sPEC.      sPEC  - Diagnosed by severe range BP requiring IV tx, has also had severe ranges >4 hrs apart  - S/p IV lab 20/40 at [facility] ; last IV tx here  pm IV lab 20  - HELLP labs negative over multiple sets with last , P:C 1.43  - For twice weekly HELLP labs (Mon/Thurs) while inpatient if BP & symptoms stable  - Asymptomatic this AM  - s/p 12hr Mag gtt on admission  - Current BP Regimen: nifed 60 BID + Lab 400 BID  - Continued " recommendation for inpatient monitoring due to sPEC, and recommendation for delivery at 34.0 wga at the latest, however would deliver before 34wks for indications including but not limited to rapid uptitration of antihypertensives and concern for fetal wellbeing in the setting of now known FGR.      FGR, rhizomelic shortening of long bones  - EFW (1/25): 526g (4%ile), normal Dopplers  - Twice weekly BPP and Dopplers (Mon/Thurs)  - Genetics consulted, will fu recs  - AEDF 1/31/24     Fetal status  - NRNST this AM, for BPP  - BMZ 1/26-1/27  - Marginal insertion of umbilical cord, last fetal biometry low-normal with EFW 10% on 12/26, follow up US 1/12 pending final read. Formal US with EFW 4%, AC 33%, Long Bones <1%. Dopplers wnl. Discussed plan for twice weekly dopplers moving forward to monitor fetal status closely.  -Genetics c/s in the setting of FGR with shortened long bones, Discussed options for possible amniocentesis, cord blood karyotyping  vs fetal karyotyping with CMA, genetic panel or RONEL. Will continue to address while inpatient.  - Breech presentation 1/29 US, re-scan if headed toward delivery.  - s/p NICU cs 1/29     Routine:  - GBS negative 1/27  - TDAP: offered 1/29 given increased likelihood of PTD, not yet received, will fu today  - Flu 10/23  - 1hr: not yet completed, POCT BG checks wnl in the s/o BMZ  - BCM: undecided     Dispo: Continued inpatient BP management and observation for sPEC.    Pt seen and discussed with [facility] Attending, Dr. Rodriguez.   David Bustamante MD PGY3  MFM  Pager [phone]     Principal Problem:    Severe pre-eclampsia in second trimester  Active Problems:    Poor fetal growth affecting management of mother in second trimester

## 2024-02-01 LAB
ABO GROUP (TYPE) IN BLOOD: NORMAL
ALBUMIN SERPL BCP-MCNC: 3.2 G/DL (ref 3.4–5)
ALP SERPL-CCNC: 43 U/L (ref 33–110)
ALT SERPL W P-5'-P-CCNC: 24 U/L (ref 7–45)
ANION GAP SERPL CALC-SCNC: 13 MMOL/L (ref 10–20)
ANTIBODY SCREEN: NORMAL
AST SERPL W P-5'-P-CCNC: 16 U/L (ref 9–39)
BILIRUB SERPL-MCNC: 0.3 MG/DL (ref 0–1.2)
BUN SERPL-MCNC: 17 MG/DL (ref 6–23)
CALCIUM SERPL-MCNC: 8.8 MG/DL (ref 8.6–10.6)
CHLORIDE SERPL-SCNC: 105 MMOL/L (ref 98–107)
CO2 SERPL-SCNC: 22 MMOL/L (ref 21–32)
CREAT SERPL-MCNC: 0.5 MG/DL (ref 0.5–1.05)
EGFRCR SERPLBLD CKD-EPI 2021: >90 ML/MIN/1.73M*2
ERYTHROCYTE [DISTWIDTH] IN BLOOD BY AUTOMATED COUNT: 13.4 % (ref 11.5–14.5)
GLUCOSE SERPL-MCNC: 74 MG/DL (ref 74–99)
HCT VFR BLD AUTO: 35.8 % (ref 36–46)
HGB BLD-MCNC: 12.5 G/DL (ref 12–16)
MCH RBC QN AUTO: 30.3 PG (ref 26–34)
MCHC RBC AUTO-ENTMCNC: 34.9 G/DL (ref 32–36)
MCV RBC AUTO: 87 FL (ref 80–100)
NRBC BLD-RTO: 0 /100 WBCS (ref 0–0)
PLATELET # BLD AUTO: 272 X10*3/UL (ref 150–450)
POTASSIUM SERPL-SCNC: 3.9 MMOL/L (ref 3.5–5.3)
PROT SERPL-MCNC: 5.8 G/DL (ref 6.4–8.2)
RBC # BLD AUTO: 4.13 X10*6/UL (ref 4–5.2)
RH FACTOR (ANTIGEN D): NORMAL
SODIUM SERPL-SCNC: 136 MMOL/L (ref 136–145)
WBC # BLD AUTO: 15.8 X10*3/UL (ref 4.4–11.3)

## 2024-02-01 PROCEDURE — 2500000001 HC RX 250 WO HCPCS SELF ADMINISTERED DRUGS (ALT 637 FOR MEDICARE OP): Performed by: STUDENT IN AN ORGANIZED HEALTH CARE EDUCATION/TRAINING PROGRAM

## 2024-02-01 PROCEDURE — 59025 FETAL NON-STRESS TEST: CPT | Mod: GC | Performed by: STUDENT IN AN ORGANIZED HEALTH CARE EDUCATION/TRAINING PROGRAM

## 2024-02-01 PROCEDURE — 59025 FETAL NON-STRESS TEST: CPT | Performed by: STUDENT IN AN ORGANIZED HEALTH CARE EDUCATION/TRAINING PROGRAM

## 2024-02-01 PROCEDURE — 85027 COMPLETE CBC AUTOMATED: CPT

## 2024-02-01 PROCEDURE — 36415 COLL VENOUS BLD VENIPUNCTURE: CPT

## 2024-02-01 PROCEDURE — 36415 COLL VENOUS BLD VENIPUNCTURE: CPT | Performed by: STUDENT IN AN ORGANIZED HEALTH CARE EDUCATION/TRAINING PROGRAM

## 2024-02-01 PROCEDURE — 1210000001 HC SEMI-PRIVATE ROOM DAILY

## 2024-02-01 PROCEDURE — 80053 COMPREHEN METABOLIC PANEL: CPT

## 2024-02-01 PROCEDURE — 86850 RBC ANTIBODY SCREEN: CPT

## 2024-02-01 PROCEDURE — 2500000004 HC RX 250 GENERAL PHARMACY W/ HCPCS (ALT 636 FOR OP/ED)

## 2024-02-01 PROCEDURE — 99232 SBSQ HOSP IP/OBS MODERATE 35: CPT | Performed by: STUDENT IN AN ORGANIZED HEALTH CARE EDUCATION/TRAINING PROGRAM

## 2024-02-01 RX ORDER — LABETALOL 100 MG/1
600 TABLET, FILM COATED ORAL EVERY 12 HOURS
Status: DISCONTINUED | OUTPATIENT
Start: 2024-02-01 | End: 2024-02-02

## 2024-02-01 RX ADMIN — LABETALOL HYDROCHLORIDE 600 MG: 100 TABLET, FILM COATED ORAL at 08:45

## 2024-02-01 RX ADMIN — LABETALOL HYDROCHLORIDE 600 MG: 100 TABLET, FILM COATED ORAL at 18:06

## 2024-02-01 RX ADMIN — NIFEDIPINE 60 MG: 60 TABLET, EXTENDED RELEASE ORAL at 20:29

## 2024-02-01 RX ADMIN — NIFEDIPINE 60 MG: 60 TABLET, EXTENDED RELEASE ORAL at 08:46

## 2024-02-01 ASSESSMENT — PAIN - FUNCTIONAL ASSESSMENT
PAIN_FUNCTIONAL_ASSESSMENT: 0-10

## 2024-02-01 ASSESSMENT — PAIN DESCRIPTION - DESCRIPTORS: DESCRIPTORS: ACHING

## 2024-02-01 ASSESSMENT — PAIN SCALES - GENERAL
PAINLEVEL_OUTOF10: 1
PAINLEVEL_OUTOF10: 0 - NO PAIN
PAINLEVEL_OUTOF10: 0 - NO PAIN

## 2024-02-01 NOTE — CARE PLAN
The patient's goals for the shift include remain pregnant    The clinical goals for the shift include /110

## 2024-02-01 NOTE — PROGRESS NOTES
"ANTEPARTUM PROGRESS NOTE   2024, 7:05 AM     SUBJECTIVE:  Patient has no complaints this morning, feeling well. Denies painful contractions, VB, LOF. Reports normal fetal movement.   Denies HA, vision changes, CP, SOB, RUQ or epigastric pain.     OBJECTIVE:   BP (!) 143/87   Pulse 68   Temp 36.3 °C (97.3 °F) (Temporal)   Resp 16   Ht 1.702 m (5' 7\")   Wt 78.2 kg (172 lb 6.4 oz)   LMP 2023   SpO2 98%   BMI 27.00 kg/m²    Temp  Min: 36.3 °C (97.3 °F)  Max: 37.6 °C (99.7 °F)  Pulse  Min: 63  Max: 85  BP  Min: 134/78  Max: 150/89    General:  AAOx3, No acute distress  Cardiovascular: Warm and well perfused, RRR  Respiratory: Normal respiratory effort, CTAB  Abdominal:  Soft, gravid, non-tender, no rebound or guarding    NST: Baseline 135/mod/+accel/-decels, AGA  [place]: acontractile     Labs:   Lab Results   Component Value Date    WBC 16.5 (H) 2024    HGB 12.8 2024    HCT 37.3 2024     2024     Lab Results   Component Value Date    GLUCOSE 80 2024     2024    K 4.4 2024     2024    CO2 21 2024    ANIONGAP 13 2024    BUN 16 2024    CREATININE 0.54 2024    EGFR >90 2024    CALCIUM 9.0 2024    ALBUMIN 3.3 (L) 2024    PROT 5.7 (L) 2024    ALKPHOS 42 2024    ALT 24 2024    AST 22 2024    BILITOT 0.3 2024     No results found for: \"UTPCR\"    ASSESSMENT AND PLAN:     32yo  @ 24w4d by IVF dating admitted as transfer for severe range BP, found to have sPEC.      sPEC  - Diagnosed by severe range BP requiring IV tx, has also had severe ranges >4 hrs apart  - S/p IV lab 20/40 at [facility] ; last IV tx here  pm IV lab 20  - HELLP labs negative over multiple sets with last , P:C 1.43  - For twice weekly HELLP labs (Mon/Thurs) while inpatient if BP & symptoms stable  - Asymptomatic this AM though with high mild range BP noted around 7pm each day  - s/p 12hr Mag gtt on " admission  - Current BP Regimen: nifed 60 BID + Lab 600 BID (Increased 2/1)  - Continued recommendation for inpatient monitoring due to sPEC, and recommendation for delivery at 34.0 wga at the latest, however would deliver before 34wks for indications including but not limited to rapid uptitration of antihypertensives and concern for fetal wellbeing in the setting of now known FGR.      FGR, rhizomelic shortening of long bones  - EFW (1/25): 526g (4%ile), normal Dopplers  - Twice weekly BPP and Dopplers (Mon/Thurs)  - Genetics consulted: Discussed options for possible amniocentesis, cord blood karyotyping  vs fetal karyotyping with CMA, genetic panel or RONEL. Will continue to address while inpatient.  - AEDF 1/31/24     Fetal status  - AGA NST this AM  - BMZ 1/26-1/27  - Breech presentation 1/29 US, re-scan if headed toward delivery.  - s/p NICU cs 1/29     Routine:  - GBS negative 1/27  - TDAP: offered 1/29 given increased likelihood of PTD, not yet received, will fu today  - Flu 10/23  - 1hr: not yet completed, POCT BG checks wnl in the s/o BMZ  - BCM: declines     Dispo: Continued inpatient BP management and observation for sPEC.    Pt seen and discussed with [facility] Attending, Dr. Rodriguez.   Kina Domingo MD PGY3  [facility]  Pager [phone]     Principal Problem:    Severe pre-eclampsia in second trimester  Active Problems:    Poor fetal growth affecting management of mother in second trimester

## 2024-02-02 ENCOUNTER — APPOINTMENT (OUTPATIENT)
Dept: RADIOLOGY | Facility: HOSPITAL | Age: 34
End: 2024-02-02
Payer: COMMERCIAL

## 2024-02-02 PROCEDURE — 76818 FETAL BIOPHYS PROFILE W/NST: CPT | Performed by: OBSTETRICS & GYNECOLOGY

## 2024-02-02 PROCEDURE — 99232 SBSQ HOSP IP/OBS MODERATE 35: CPT

## 2024-02-02 PROCEDURE — 59025 FETAL NON-STRESS TEST: CPT

## 2024-02-02 PROCEDURE — 76820 UMBILICAL ARTERY ECHO: CPT | Performed by: OBSTETRICS & GYNECOLOGY

## 2024-02-02 PROCEDURE — 76818 FETAL BIOPHYS PROFILE W/NST: CPT

## 2024-02-02 PROCEDURE — 2500000001 HC RX 250 WO HCPCS SELF ADMINISTERED DRUGS (ALT 637 FOR MEDICARE OP)

## 2024-02-02 PROCEDURE — 1210000001 HC SEMI-PRIVATE ROOM DAILY

## 2024-02-02 PROCEDURE — 2500000001 HC RX 250 WO HCPCS SELF ADMINISTERED DRUGS (ALT 637 FOR MEDICARE OP): Performed by: STUDENT IN AN ORGANIZED HEALTH CARE EDUCATION/TRAINING PROGRAM

## 2024-02-02 PROCEDURE — 2500000004 HC RX 250 GENERAL PHARMACY W/ HCPCS (ALT 636 FOR OP/ED)

## 2024-02-02 PROCEDURE — 59025 FETAL NON-STRESS TEST: CPT | Mod: GC

## 2024-02-02 PROCEDURE — 76820 UMBILICAL ARTERY ECHO: CPT

## 2024-02-02 PROCEDURE — 76821 MIDDLE CEREBRAL ARTERY ECHO: CPT | Performed by: OBSTETRICS & GYNECOLOGY

## 2024-02-02 RX ORDER — ACETAMINOPHEN 325 MG/1
975 TABLET ORAL ONCE
Status: COMPLETED | OUTPATIENT
Start: 2024-02-02 | End: 2024-02-02

## 2024-02-02 RX ORDER — LABETALOL 100 MG/1
600 TABLET, FILM COATED ORAL EVERY 8 HOURS
Status: DISCONTINUED | OUTPATIENT
Start: 2024-02-02 | End: 2024-02-02

## 2024-02-02 RX ORDER — LABETALOL 100 MG/1
600 TABLET, FILM COATED ORAL 2 TIMES DAILY
Status: DISCONTINUED | OUTPATIENT
Start: 2024-02-02 | End: 2024-02-04

## 2024-02-02 RX ADMIN — LABETALOL HYDROCHLORIDE 600 MG: 100 TABLET, FILM COATED ORAL at 05:37

## 2024-02-02 RX ADMIN — LABETALOL HYDROCHLORIDE 600 MG: 100 TABLET, FILM COATED ORAL at 18:04

## 2024-02-02 RX ADMIN — NIFEDIPINE 60 MG: 60 TABLET, EXTENDED RELEASE ORAL at 21:10

## 2024-02-02 RX ADMIN — NIFEDIPINE 60 MG: 60 TABLET, EXTENDED RELEASE ORAL at 08:40

## 2024-02-02 RX ADMIN — ACETAMINOPHEN 975 MG: 325 TABLET ORAL at 05:43

## 2024-02-02 ASSESSMENT — PAIN - FUNCTIONAL ASSESSMENT
PAIN_FUNCTIONAL_ASSESSMENT: 0-10

## 2024-02-02 ASSESSMENT — PAIN DESCRIPTION - LOCATION: LOCATION: HEAD

## 2024-02-02 ASSESSMENT — PAIN SCALES - GENERAL
PAINLEVEL_OUTOF10: 0 - NO PAIN
PAINLEVEL_OUTOF10: 0 - NO PAIN
PAINLEVEL_OUTOF10: 3
PAINLEVEL_OUTOF10: 0 - NO PAIN
PAINLEVEL_OUTOF10: 0 - NO PAIN

## 2024-02-02 NOTE — PROGRESS NOTES
"ANTEPARTUM PROGRESS NOTE   2024, 12:07 PM     SUBJECTIVE:  Patient has no complaints this morning, feeling well. Denies painful contractions, VB, LOF. Reports normal fetal movement.   Denies HA, vision changes, CP, SOB, RUQ or epigastric pain.     OBJECTIVE:   BP (!) 150/95 (BP Location: Right arm, Patient Position: Lying)   Pulse 69   Temp 36.6 °C (97.9 °F) (Temporal)   Resp 16   Ht 1.702 m (5' 7\")   Wt 78.2 kg (172 lb 6.4 oz)   LMP 2023   SpO2 98%   BMI 27.00 kg/m²    Temp  Min: 36.4 °C (97.5 °F)  Max: 37.1 °C (98.8 °F)  Pulse  Min: 69  Max: 79  BP  Min: 128/71  Max: 151/93    General:  AAOx3, No acute distress  Cardiovascular: Warm and well perfused, RRR  Respiratory: Normal respiratory effort, CTAB  Abdominal:  Soft, gravid, non-tender, no rebound or guarding    NST: Baseline 135/mod/+accel/-decels, AGA  [place]: acontractile     Labs:   Lab Results   Component Value Date    WBC 15.8 (H) 2024    HGB 12.5 2024    HCT 35.8 (L) 2024     2024     Lab Results   Component Value Date    GLUCOSE 74 2024     2024    K 3.9 2024     2024    CO2 22 2024    ANIONGAP 13 2024    BUN 17 2024    CREATININE 0.50 2024    EGFR >90 2024    CALCIUM 8.8 2024    ALBUMIN 3.2 (L) 2024    PROT 5.8 (L) 2024    ALKPHOS 43 2024    ALT 24 2024    AST 16 2024    BILITOT 0.3 2024     No results found for: \"UTPCR\"    ASSESSMENT AND PLAN:     32yo  @ 24w5d by IVF dating admitted as transfer for severe range BP, found to have sPEC.      sPEC   - Diagnosed by severe range BP requiring IV tx, has also had severe ranges >4 hrs apart  - S/p IV lab  at [facility] ; last IV tx here  pm IV lab 20  - HELLP labs negative over multiple sets with last , P:C 1.43  - For twice weekly HELLP labs (Mon/Thurs) while inpatient if BP & symptoms stable  - Asymptomatic this AM though still with high " mild range BP noted around 7pm each day  - s/p 12hr Mag gtt on admission  - Current BP Regimen: nifed 60 BID + Lab 600 BID (Increased 2/1)  - Continued recommendation for inpatient monitoring due to sPEC, and recommendation for delivery at 34.0 wga at the latest, however would deliver before 34wks for indications including but not limited to rapid uptitration of antihypertensives and concern for fetal wellbeing in the setting of now known FGR.      FGR, rhizomelic shortening of long bones  - EFW (1/25): 526g (4%ile), normal Dopplers  - Twice weekly BPP and Dopplers (Mon/Thurs)  - Genetics consulted: Discussed options for possible amniocentesis, cord blood karyotyping  vs fetal karyotyping with CMA, genetic panel or RONEL. Will continue to address while inpatient.  - AEDF 1/31/24  - BPP/Dopplers today, wnl     Fetal status  - AGA NST this AM, BPP/Dopplers, wnl  - [facility]26[facility]/27[facility]  - Breech presentation 1/29 US, re-scan if headed toward delivery.  - s/p NICU cs 1/29     Routine:  - GBS negative 1/27  - TDAP: offered 1/29 given increased likelihood of PTD, not yet received, will fu today  - Flu 10/23  - 1hr: not yet completed, POCT BG checks wnl in the s/o BMZ  - BCM: declines     Dispo: Continued inpatient BP management/observation for sPEC, observation of fetal status in relation to FGR diagnosis.    Pt seen and to be discussed with [facility] Attending, Dr. Rodriguez.   Kina De La Rosa MD PGY1  [facility]  Pager [phone]     Principal Problem:    Severe pre-eclampsia in second trimester  Active Problems:    Poor fetal growth affecting management of mother in second trimester

## 2024-02-03 PROCEDURE — 2500000004 HC RX 250 GENERAL PHARMACY W/ HCPCS (ALT 636 FOR OP/ED)

## 2024-02-03 PROCEDURE — 1210000001 HC SEMI-PRIVATE ROOM DAILY

## 2024-02-03 PROCEDURE — 99232 SBSQ HOSP IP/OBS MODERATE 35: CPT

## 2024-02-03 PROCEDURE — 59025 FETAL NON-STRESS TEST: CPT

## 2024-02-03 PROCEDURE — 2500000001 HC RX 250 WO HCPCS SELF ADMINISTERED DRUGS (ALT 637 FOR MEDICARE OP)

## 2024-02-03 PROCEDURE — 59025 FETAL NON-STRESS TEST: CPT | Mod: GC

## 2024-02-03 RX ADMIN — NIFEDIPINE 60 MG: 60 TABLET, EXTENDED RELEASE ORAL at 08:48

## 2024-02-03 RX ADMIN — LABETALOL HYDROCHLORIDE 600 MG: 100 TABLET, FILM COATED ORAL at 17:56

## 2024-02-03 RX ADMIN — NIFEDIPINE 60 MG: 60 TABLET, EXTENDED RELEASE ORAL at 20:14

## 2024-02-03 RX ADMIN — LABETALOL HYDROCHLORIDE 600 MG: 100 TABLET, FILM COATED ORAL at 06:31

## 2024-02-03 ASSESSMENT — PAIN SCALES - GENERAL
PAINLEVEL_OUTOF10: 0 - NO PAIN

## 2024-02-03 ASSESSMENT — PAIN - FUNCTIONAL ASSESSMENT
PAIN_FUNCTIONAL_ASSESSMENT: 0-10

## 2024-02-03 NOTE — PROGRESS NOTES
"ANTEPARTUM PROGRESS NOTE   2/3/2024, 5:37 AM     SUBJECTIVE:  Patient has no complaints this morning, feeling well. Denies painful contractions, VB, LOF. Reports normal fetal movement.   Denies HA, vision changes, CP, SOB, RUQ or epigastric pain.     OBJECTIVE:   /79 (BP Location: Right arm, Patient Position: Lying)   Pulse 87   Temp 36.4 °C (97.5 °F) (Temporal)   Resp 16   Ht 1.702 m (5' 7\")   Wt 78.2 kg (172 lb 6.4 oz)   LMP 2023   SpO2 97%   BMI 27.00 kg/m²        General:  AAOx3, No acute distress  Cardiovascular: Warm and well perfused  Respiratory: Normal respiratory effort  Abdominal:  Soft, gravid, non-tender, no rebound or guarding    NST: Baseline 130/mod/+accel/-decel, AGA  [place]: acontractile     Labs:   Lab Results   Component Value Date    WBC 15.8 (H) 2024    HGB 12.5 2024    HCT 35.8 (L) 2024     2024     Lab Results   Component Value Date    GLUCOSE 74 2024     2024    K 3.9 2024     2024    CO2 22 2024    ANIONGAP 13 2024    BUN 17 2024    CREATININE 0.50 2024    EGFR >90 2024    CALCIUM 8.8 2024    ALBUMIN 3.2 (L) 2024    PROT 5.8 (L) 2024    ALKPHOS 43 2024    ALT 24 2024    AST 16 2024    BILITOT 0.3 2024       ASSESSMENT AND PLAN:     32yo  @ 24w6d by IVF dating admitted as transfer for severe range BP, found to have sPEC.      sPEC   - Diagnosed by severe range BP requiring IV tx, has also had severe ranges >4 hrs apart  - S/p IV lab 20/40 at [facility] ; last IV tx here  pm IV lab 20  - HELLP labs negative over multiple sets with last , P:C 1.43  - For twice weekly HELLP labs (Mon/Thurs) while inpatient if BP & symptoms stable  - Asymptomatic this AM though still with high mild range BP noted around 7pm over last few days, BP normotensive overnight  - s/p 12hr Mag gtt on admission  - Current BP Regimen: nifed 60 BID + Lab 600 " BID (Increased 2/1)  - Continued recommendation for inpatient monitoring due to sPEC, and recommendation for delivery at 34.0 wga at the latest, however would deliver before 34wks for indications including but not limited to rapid uptitration of antihypertensives and concern for fetal wellbeing in the setting of now known FGR.      FGR, rhizomelic shortening of long bones  - EFW (1/25): 526g (4%ile), normal Dopplers  - Twice weekly BPP and Dopplers (Mon/Thurs)  - Genetics consulted: Discussed options for possible amniocentesis, cord blood karyotyping  vs fetal karyotyping with CMA, genetic panel or RONEL. Will continue to address while inpatient.  - AEDF 1/31/24  - BPP/Dopplers wnl on 2/2     Fetal status  - AGA NST this AM  - BPP/Dopplers wnl on 2/2  - BMZ 1/26-1/27, if rEDF will be for rBMZ 2/3  - Breech presentation 1/29 US, re-scan if headed toward delivery.  - s/p NICU cs 1/29     Routine:  - GBS negative 1/27  - TDAP: offered 1/29 given increased likelihood of PTD, not yet received yet  - Flu 10/23  - 1hr: not yet completed, POCT BG checks wnl in the s/o BMZ  - BCM: declines     Dispo: Continued inpatient BP management/observation for sPEC, observation of fetal status in relation to FGR diagnosis.    Pt seen and to be discussed with [facility] Attending, Dr. Rodriguez.   Catherine Byrne MD PGY2  [facility]  Pager [phone]     Principal Problem:    Severe pre-eclampsia in second trimester  Active Problems:    Poor fetal growth affecting management of mother in second trimester

## 2024-02-03 NOTE — CARE PLAN
The patient's goals for the shift include STAY PREGNANT    The clinical goals for the shift include BPs remain <160/110    Patient has remained stable this shift. Patient has endorsed good FM, no leakage of fluid, or VB. Patient FHR remaines reassuring. Patient BP controlled with PO antihypertensives. Patient VS and assessments stable. RN will continue to monitor for any changes.

## 2024-02-03 NOTE — CARE PLAN
The patient's goals for the shift include STAY PREGNANT    The clinical goals for the shift include BPs remain <160/110      Problem: Antepartum  Goal: FHR remains reassuring  Outcome: Progressing  Goal: Minimize anxiety/maximize coping  Outcome: Progressing     Problem: Antepartum  Goal: Minimize anxiety/maximize coping  Outcome: Progressing     Problem: Hypertensive Disorder of Pregnancy (HDP)  Goal: Minimal s/sx of HDP and BP<160/110  Outcome: Progressing     Problem: Pain - Adult  Goal: Verbalizes/displays adequate comfort level or baseline comfort level  Outcome: Progressing     Problem: Safety - Adult  Goal: Free from fall injury  Outcome: Progressing

## 2024-02-04 PROCEDURE — 59025 FETAL NON-STRESS TEST: CPT

## 2024-02-04 PROCEDURE — 59025 FETAL NON-STRESS TEST: CPT | Mod: GC

## 2024-02-04 PROCEDURE — 2500000001 HC RX 250 WO HCPCS SELF ADMINISTERED DRUGS (ALT 637 FOR MEDICARE OP)

## 2024-02-04 PROCEDURE — 99232 SBSQ HOSP IP/OBS MODERATE 35: CPT

## 2024-02-04 PROCEDURE — 1210000001 HC SEMI-PRIVATE ROOM DAILY

## 2024-02-04 PROCEDURE — 2500000004 HC RX 250 GENERAL PHARMACY W/ HCPCS (ALT 636 FOR OP/ED)

## 2024-02-04 RX ORDER — LABETALOL 100 MG/1
600 TABLET, FILM COATED ORAL EVERY 8 HOURS
Status: DISCONTINUED | OUTPATIENT
Start: 2024-02-04 | End: 2024-02-05

## 2024-02-04 RX ADMIN — NIFEDIPINE 60 MG: 60 TABLET, EXTENDED RELEASE ORAL at 08:47

## 2024-02-04 RX ADMIN — LABETALOL HYDROCHLORIDE 600 MG: 100 TABLET, FILM COATED ORAL at 23:12

## 2024-02-04 RX ADMIN — LABETALOL HYDROCHLORIDE 600 MG: 100 TABLET, FILM COATED ORAL at 06:25

## 2024-02-04 RX ADMIN — LABETALOL HYDROCHLORIDE 600 MG: 100 TABLET, FILM COATED ORAL at 18:06

## 2024-02-04 RX ADMIN — NIFEDIPINE 60 MG: 60 TABLET, EXTENDED RELEASE ORAL at 20:23

## 2024-02-04 ASSESSMENT — PAIN - FUNCTIONAL ASSESSMENT
PAIN_FUNCTIONAL_ASSESSMENT: 0-10

## 2024-02-04 ASSESSMENT — PAIN SCALES - GENERAL
PAINLEVEL_OUTOF10: 0 - NO PAIN
PAINLEVEL_OUTOF10: 0 - NO PAIN

## 2024-02-04 NOTE — CARE PLAN
The patient's goals for the shift include STAY PREGNANT    The clinical goals for the shift include Maintain BP<160/110    Patient remains stable this shift. FHR remains reassuring. Patient BP controlled with PO antihypertensives. Patient states no leakage of fluid or VB. All VS and assessments WNL.  RN will continue to monitor for any changes.

## 2024-02-04 NOTE — PROGRESS NOTES
"ANTEPARTUM PROGRESS NOTE   2024, 5:51 AM     SUBJECTIVE:  No acute events overnight. Denies painful contractions, VB, LOF. Reports normal fetal movement.   Denies HA, vision changes, CP, SOB, RUQ or epigastric pain.     OBJECTIVE:   /79   Pulse 69   Temp 37 °C (98.6 °F) (Temporal)   Resp 18   Ht 1.702 m (5' 7\")   Wt 78.2 kg (172 lb 6.4 oz)   LMP 2023   SpO2 98%   BMI 27.00 kg/m²        General:  AAOx3, No acute distress  Cardiovascular: Warm and well perfused  Respiratory: Normal respiratory effort  Abdominal:  Soft, gravid, non-tender, no rebound or guarding    NST: Baseline 125/mod/+accel/-decel, AGA  [place]: acontractile     Labs:   Lab Results   Component Value Date    WBC 15.8 (H) 2024    HGB 12.5 2024    HCT 35.8 (L) 2024     2024     Lab Results   Component Value Date    GLUCOSE 74 2024     2024    K 3.9 2024     2024    CO2 22 2024    ANIONGAP 13 2024    BUN 17 2024    CREATININE 0.50 2024    EGFR >90 2024    CALCIUM 8.8 2024    ALBUMIN 3.2 (L) 2024    PROT 5.8 (L) 2024    ALKPHOS 43 2024    ALT 24 2024    AST 16 2024    BILITOT 0.3 2024       ASSESSMENT AND PLAN:     34yo  @ 25w0d by IVF dating admitted as transfer for severe range BP, found to have sPEC.      sPEC   - Diagnosed by severe range BP requiring IV tx, has also had severe ranges >4 hrs apart  - S/p IV lab 20/40 at [facility] ; last IV tx here  pm IV lab 20  - HELLP labs negative over multiple sets with last , P:C 1.43  - For twice weekly HELLP labs (Mon/Thurs) while inpatient if BP & symptoms stable  - Asymptomatic this AM, BP normotensive overnight  - s/p 12hr Mag gtt on admission  - Current BP Regimen: nifed 60 BID + Lab 600 BID (Increased )  - Continued recommendation for inpatient monitoring due to sPEC, and recommendation for delivery at 34.0 wga at the latest, however " would deliver before 34wks for indications including but not limited to rapid uptitration of antihypertensives and concern for fetal wellbeing in the setting of now known FGR.      FGR, rhizomelic shortening of long bones  - EFW (1/25): 526g (4%ile), normal Dopplers  - Twice weekly BPP and Dopplers (Mon/Thurs)  - Genetics consulted: Discussed options for possible amniocentesis, cord blood karyotyping  vs fetal karyotyping with CMA, genetic panel or RONEL. Will continue to address while inpatient.  - AEDF 1/31/24  - BPP/Dopplers wnl on 2/2     Fetal status  - AGA NST this AM  - BPP/Dopplers wnl on 2/2  - BMZ 1/26-1/27, if rEDF will be for rBMZ 2/3  - Breech presentation 1/29 US, re-scan if headed toward delivery.  - s/p NICU cs 1/29     Routine:  - GBS negative 1/27  - TDAP: offered 1/29 given increased likelihood of PTD, not yet received yet  - Flu 10/23  - 1hr: not yet completed, POCT BG checks wnl in the s/o BMZ  - BCM: declines     Dispo: Continued inpatient BP management/observation for sPEC, observation of fetal status in relation to FGR diagnosis.    Pt seen and to be discussed with [facility] Attending, Dr. Rodriguez.   Catherine Byrne MD PGY2  [facility]  Pager [phone]     Principal Problem:    Severe pre-eclampsia in second trimester  Active Problems:    Poor fetal growth affecting management of mother in second trimester

## 2024-02-05 ENCOUNTER — APPOINTMENT (OUTPATIENT)
Dept: RADIOLOGY | Facility: HOSPITAL | Age: 34
End: 2024-02-05
Payer: COMMERCIAL

## 2024-02-05 LAB
ABO GROUP (TYPE) IN BLOOD: NORMAL
ALBUMIN SERPL BCP-MCNC: 3.3 G/DL (ref 3.4–5)
ALP SERPL-CCNC: 49 U/L (ref 33–110)
ALT SERPL W P-5'-P-CCNC: 24 U/L (ref 7–45)
ANION GAP SERPL CALC-SCNC: 13 MMOL/L (ref 10–20)
ANTIBODY SCREEN: NORMAL
AST SERPL W P-5'-P-CCNC: 20 U/L (ref 9–39)
BILIRUB SERPL-MCNC: 0.3 MG/DL (ref 0–1.2)
BUN SERPL-MCNC: 17 MG/DL (ref 6–23)
CALCIUM SERPL-MCNC: 9 MG/DL (ref 8.6–10.6)
CHLORIDE SERPL-SCNC: 107 MMOL/L (ref 98–107)
CO2 SERPL-SCNC: 20 MMOL/L (ref 21–32)
CREAT SERPL-MCNC: 0.65 MG/DL (ref 0.5–1.05)
EGFRCR SERPLBLD CKD-EPI 2021: >90 ML/MIN/1.73M*2
ERYTHROCYTE [DISTWIDTH] IN BLOOD BY AUTOMATED COUNT: 13 % (ref 11.5–14.5)
GLUCOSE SERPL-MCNC: 112 MG/DL (ref 74–99)
HCT VFR BLD AUTO: 36.8 % (ref 36–46)
HGB BLD-MCNC: 12.3 G/DL (ref 12–16)
MCH RBC QN AUTO: 29.8 PG (ref 26–34)
MCHC RBC AUTO-ENTMCNC: 33.4 G/DL (ref 32–36)
MCV RBC AUTO: 89 FL (ref 80–100)
NRBC BLD-RTO: 0 /100 WBCS (ref 0–0)
PLATELET # BLD AUTO: 290 X10*3/UL (ref 150–450)
POTASSIUM SERPL-SCNC: 3.8 MMOL/L (ref 3.5–5.3)
PROT SERPL-MCNC: 6 G/DL (ref 6.4–8.2)
RBC # BLD AUTO: 4.13 X10*6/UL (ref 4–5.2)
RH FACTOR (ANTIGEN D): NORMAL
SODIUM SERPL-SCNC: 136 MMOL/L (ref 136–145)
WBC # BLD AUTO: 15.1 X10*3/UL (ref 4.4–11.3)

## 2024-02-05 PROCEDURE — 84075 ASSAY ALKALINE PHOSPHATASE: CPT | Performed by: STUDENT IN AN ORGANIZED HEALTH CARE EDUCATION/TRAINING PROGRAM

## 2024-02-05 PROCEDURE — 2500000001 HC RX 250 WO HCPCS SELF ADMINISTERED DRUGS (ALT 637 FOR MEDICARE OP)

## 2024-02-05 PROCEDURE — 76820 UMBILICAL ARTERY ECHO: CPT

## 2024-02-05 PROCEDURE — 1100000001 HC PRIVATE ROOM DAILY

## 2024-02-05 PROCEDURE — 85027 COMPLETE CBC AUTOMATED: CPT | Performed by: STUDENT IN AN ORGANIZED HEALTH CARE EDUCATION/TRAINING PROGRAM

## 2024-02-05 PROCEDURE — 76818 FETAL BIOPHYS PROFILE W/NST: CPT

## 2024-02-05 PROCEDURE — 99233 SBSQ HOSP IP/OBS HIGH 50: CPT

## 2024-02-05 PROCEDURE — 2500000004 HC RX 250 GENERAL PHARMACY W/ HCPCS (ALT 636 FOR OP/ED)

## 2024-02-05 PROCEDURE — 76999 ECHO EXAMINATION PROCEDURE: CPT

## 2024-02-05 PROCEDURE — 86901 BLOOD TYPING SEROLOGIC RH(D): CPT | Performed by: STUDENT IN AN ORGANIZED HEALTH CARE EDUCATION/TRAINING PROGRAM

## 2024-02-05 PROCEDURE — 36415 COLL VENOUS BLD VENIPUNCTURE: CPT | Performed by: STUDENT IN AN ORGANIZED HEALTH CARE EDUCATION/TRAINING PROGRAM

## 2024-02-05 RX ORDER — LABETALOL 200 MG/1
600 TABLET, FILM COATED ORAL 2 TIMES DAILY
Status: DISCONTINUED | OUTPATIENT
Start: 2024-02-05 | End: 2024-02-06

## 2024-02-05 RX ORDER — LABETALOL 100 MG/1
600 TABLET, FILM COATED ORAL 2 TIMES DAILY
Status: DISCONTINUED | OUTPATIENT
Start: 2024-02-05 | End: 2024-02-05

## 2024-02-05 RX ORDER — BETAMETHASONE SODIUM PHOSPHATE AND BETAMETHASONE ACETATE 3; 3 MG/ML; MG/ML
12 INJECTION, SUSPENSION INTRA-ARTICULAR; INTRALESIONAL; INTRAMUSCULAR; SOFT TISSUE EVERY 24 HOURS
Status: DISCONTINUED | OUTPATIENT
Start: 2024-02-05 | End: 2024-02-06

## 2024-02-05 RX ADMIN — BETAMETHASONE ACETATE AND BETAMETHASONE SODIUM PHOSPHATE 12 MG: 3; 3 INJECTION, SUSPENSION INTRA-ARTICULAR; INTRALESIONAL; INTRAMUSCULAR; SOFT TISSUE at 12:05

## 2024-02-05 RX ADMIN — LABETALOL HYDROCHLORIDE 600 MG: 100 TABLET, FILM COATED ORAL at 06:48

## 2024-02-05 RX ADMIN — NIFEDIPINE 60 MG: 60 TABLET, EXTENDED RELEASE ORAL at 21:35

## 2024-02-05 RX ADMIN — NIFEDIPINE 60 MG: 60 TABLET, EXTENDED RELEASE ORAL at 08:52

## 2024-02-05 RX ADMIN — LABETALOL HYDROCHLORIDE 600 MG: 100 TABLET, FILM COATED ORAL at 18:01

## 2024-02-05 ASSESSMENT — PAIN SCALES - GENERAL
PAINLEVEL_OUTOF10: 0 - NO PAIN

## 2024-02-05 ASSESSMENT — PAIN - FUNCTIONAL ASSESSMENT
PAIN_FUNCTIONAL_ASSESSMENT: 0-10

## 2024-02-05 NOTE — PROGRESS NOTES
"ANTEPARTUM PROGRESS NOTE   2024, 8:44 AM     SUBJECTIVE:  No acute events overnight. Denies contractions, VB, LOF. Reports normal fetal movement.   Denies HA, vision changes, CP, SOB, RUQ or epigastric pain.     OBJECTIVE:   /82   Pulse 80   Temp 36.8 °C (98.2 °F) (Temporal)   Resp 18   Ht 1.702 m (5' 7\")   Wt 78.2 kg (172 lb 6.4 oz)   LMP 2023   SpO2 97%   BMI 27.00 kg/m²        General:  AAOx3, No acute distress  Cardiovascular: Warm and well perfused  Respiratory: Normal respiratory effort  Abdominal:  Soft, gravid, non-tender, no rebound or guarding    NST: Baseline 130/mod/+accel/variable decel x2  [place]: acontractile           ASSESSMENT AND PLAN:     34yo  @ 25w1d by IVF dating admitted as transfer for severe range BP, found to have sPEC.      sPEC   - Diagnosed by severe range BP requiring IV tx, has also had severe ranges >4 hrs apart  - S/p IV lab 20/40 at [facility] ; last IV tx here  pm IV lab 20  - HELLP labs negative over multiple sets with last , P:C 1.43  - For twice weekly HELLP labs (Mon/Thurs) while inpatient if BP & symptoms stable, repeat pending this AM  - Asymptomatic this AM, systolic BP in the 150s x2 overnight  - s/p 12hr Mag gtt on admission  - Current BP Regimen: nifed 60 BID + Lab 600 BID  -> Labetalol 600 TID (2/4 PM)  - Continued recommendation for inpatient monitoring due to sPEC, and recommendation for delivery at 34.0 wga at the latest, however would deliver before 34wks for indications including but not limited to rapid uptitration of antihypertensives and concern for fetal wellbeing in the setting of now known FGR.      FGR, rhizomelic shortening of long bones  - EFW (): 526g (4%ile), normal Dopplers  - Twice weekly BPP and Dopplers (Mon/Thurs)  - Genetics consulted: Discussed options for possible amniocentesis, cord blood karyotyping  vs fetal karyotyping with CMA, genetic panel or RONEL. Will continue to address while inpatient.  - AEDF " 1/31/24  - BPP/Dopplers wnl on 2/2     Fetal status  - AGA NST this AM  - BPP/Dopplers wnl on 2/2, repeat this AM  - BMZ 1/26-1/27, if rEDF will be for rBMZ  - Breech presentation 1/29 US, re-scan if headed toward delivery.  - s/p NICU cs 1/29     Routine:  - GBS negative 1/27  - TDAP: offered 1/29 given increased likelihood of PTD, not yet received yet  - Flu 10/23  - 1hr: not yet completed, POCT BG checks wnl in the s/o BMZ  - BCM: declines     Dispo: Continued inpatient BP management/observation for sPEC, observation of fetal status in relation to FGR diagnosis.    Pt seen and to be discussed with [facility] Attending, Dr. Walton.   Catherine Byrne MD PGY2  [facility]  Pager [phone]     Principal Problem:    Severe pre-eclampsia in second trimester  Active Problems:    Poor fetal growth affecting management of mother in second trimester

## 2024-02-05 NOTE — CARE PLAN
The patient's goals for the shift include stay pregnant    The clinical goals for the shift include maintain bpo <160/110    Over the shift, the patient did  make progress toward the following goals. Barriers to progression include none. Recommendations to address these barriers include encouraging the patient to stay relax.Labetalol changed from every eight hours to every twelve hours..

## 2024-02-06 ENCOUNTER — APPOINTMENT (OUTPATIENT)
Dept: OBSTETRICS AND GYNECOLOGY | Facility: CLINIC | Age: 34
End: 2024-02-06
Payer: COMMERCIAL

## 2024-02-06 ENCOUNTER — APPOINTMENT (OUTPATIENT)
Dept: RADIOLOGY | Facility: HOSPITAL | Age: 34
End: 2024-02-06
Payer: COMMERCIAL

## 2024-02-06 PROCEDURE — 2500000004 HC RX 250 GENERAL PHARMACY W/ HCPCS (ALT 636 FOR OP/ED)

## 2024-02-06 PROCEDURE — 1210000001 HC SEMI-PRIVATE ROOM DAILY

## 2024-02-06 PROCEDURE — 76999 ECHO EXAMINATION PROCEDURE: CPT

## 2024-02-06 PROCEDURE — 76818 FETAL BIOPHYS PROFILE W/NST: CPT | Performed by: OBSTETRICS & GYNECOLOGY

## 2024-02-06 PROCEDURE — 76818 FETAL BIOPHYS PROFILE W/NST: CPT

## 2024-02-06 PROCEDURE — 76820 UMBILICAL ARTERY ECHO: CPT

## 2024-02-06 PROCEDURE — 2500000001 HC RX 250 WO HCPCS SELF ADMINISTERED DRUGS (ALT 637 FOR MEDICARE OP)

## 2024-02-06 PROCEDURE — 99232 SBSQ HOSP IP/OBS MODERATE 35: CPT | Performed by: STUDENT IN AN ORGANIZED HEALTH CARE EDUCATION/TRAINING PROGRAM

## 2024-02-06 RX ORDER — BETAMETHASONE SODIUM PHOSPHATE AND BETAMETHASONE ACETATE 3; 3 MG/ML; MG/ML
12 INJECTION, SUSPENSION INTRA-ARTICULAR; INTRALESIONAL; INTRAMUSCULAR; SOFT TISSUE EVERY 24 HOURS
Status: DISCONTINUED | OUTPATIENT
Start: 2024-02-07 | End: 2024-02-07

## 2024-02-06 RX ORDER — ACETAMINOPHEN 325 MG/1
975 TABLET ORAL ONCE
Status: COMPLETED | OUTPATIENT
Start: 2024-02-06 | End: 2024-02-06

## 2024-02-06 RX ORDER — LABETALOL 200 MG/1
600 TABLET, FILM COATED ORAL EVERY 12 HOURS
Status: DISCONTINUED | OUTPATIENT
Start: 2024-02-06 | End: 2024-02-17

## 2024-02-06 RX ADMIN — ACETAMINOPHEN 975 MG: 325 TABLET ORAL at 05:54

## 2024-02-06 RX ADMIN — BETAMETHASONE ACETATE AND BETAMETHASONE SODIUM PHOSPHATE 12 MG: 3; 3 INJECTION, SUSPENSION INTRA-ARTICULAR; INTRALESIONAL; INTRAMUSCULAR; SOFT TISSUE at 12:07

## 2024-02-06 RX ADMIN — NIFEDIPINE 60 MG: 60 TABLET, EXTENDED RELEASE ORAL at 20:50

## 2024-02-06 RX ADMIN — LABETALOL HYDROCHLORIDE 600 MG: 200 TABLET, FILM COATED ORAL at 05:54

## 2024-02-06 RX ADMIN — LABETALOL HYDROCHLORIDE 600 MG: 200 TABLET, FILM COATED ORAL at 18:45

## 2024-02-06 RX ADMIN — NIFEDIPINE 60 MG: 60 TABLET, EXTENDED RELEASE ORAL at 09:08

## 2024-02-06 ASSESSMENT — PAIN SCALES - GENERAL
PAINLEVEL_OUTOF10: 0 - NO PAIN
PAINLEVEL_OUTOF10: 0 - NO PAIN
PAINLEVEL_OUTOF10: 4
PAINLEVEL_OUTOF10: 4
PAINLEVEL_OUTOF10: 0 - NO PAIN
PAINLEVEL_OUTOF10: 2
PAINLEVEL_OUTOF10: 0 - NO PAIN
PAINLEVEL_OUTOF10: 2

## 2024-02-06 ASSESSMENT — PAIN - FUNCTIONAL ASSESSMENT
PAIN_FUNCTIONAL_ASSESSMENT: 0-10

## 2024-02-06 ASSESSMENT — PAIN DESCRIPTION - LOCATION: LOCATION: HEAD

## 2024-02-06 ASSESSMENT — PAIN DESCRIPTION - DESCRIPTORS: DESCRIPTORS: HEADACHE

## 2024-02-06 NOTE — PROGRESS NOTES
ANTEPARTUM PROGRESS NOTE   2024, 8:44 AM      SUBJECTIVE:  Subjective pending as patient in ultrasound     OBJECTIVE:   Vitals:    24 0653   BP:    Pulse: 79   Resp:    Temp:    SpO2: 97%        Not examined as patient was in ultrasou d   NST with moderate variability overnight      ASSESSMENT AND PLAN: 34yo  at 25w2d by IVF dating admitted with preE with SF & FGR-- doing well      PreE with SF  - Diagnosed by severe range BP requiring IV tx, has also had severe ranges >4 hrs apart  - S/p IV lab 20/40 at [facility] ; last IV tx here  pm IV lab 20  - HELLP labs negative over multiple sets with last , P:C 1.43  - For twice weekly HELLP labs (Mon/Thurs) while inpatient if BP & symptoms stable, repeat appropriate yesteday  - s/p 12hr Mag gtt on admission  - Current BP Regimen: nifed 60 BID + Lab 600 BID  -> Labetalol 600 TID (/ PM)  - Continued recommendation for inpatient monitoring and recommendation for delivery at 34.0 wga at the latest, however would deliver before 34wks for indications including but not limited to rapid uptitration of antihypertensives and concern for fetal wellbeing in the setting of now known FGR.      FGR, rhizomelic shortening of long bones  - EFW (): 526g (4%ile), normal Dopplers  - Twice weekly BPP and Dopplers (Mon/Thurs)  - Genetics consulted: Discussed options for possible amniocentesis, cord blood karyotyping  vs fetal karyotyping with CMA, genetic panel or RONEL  - AEDF 24, one loop of rEDF on , dops pending this morning     Fetal status  - AGA NST this AM  - Dopplers as above  - BMZ -, s/p rBMZ x1  - s/p NICU cs , offered patient re-consult yesterday     Routine:  - GBS negative   - TDAP: offered  given increased likelihood of PTD, not yet received yet  - Flu 10/23  - 1hr: not yet completed, POCT BG checks wnl in the s/o BMZ  - BCM: declines     Dispo: Stable, but guarded position given fetal status- will continue to monitor closely      To be d/w Dr. Paulie Kent MD

## 2024-02-06 NOTE — CARE PLAN
Problem: Antepartum  Goal: Maintain pregnancy as long as maternal and/or fetal condition is stable  Outcome: Progressing     Problem: Hypertensive Disorder of Pregnancy (HDP)  Goal: Minimal s/sx of HDP and BP<160/110  Outcome: Progressing   The patient's goals for the shift include remain pregnant til 34wks GA    The clinical goals for the shift include obtain second rescue beta    Over the shift, the patient did make progress toward the following goals.

## 2024-02-06 NOTE — SIGNIFICANT EVENT
Transfer to [facility]    - PM NST reviewed by Dr. Walton, and recommended continuous monitoring on L&D overnight given concern after abnormal dopplers and nonreactive NST (lacking 10x10 accels).   - Pt evaluated: Patient resting comfortably in bed. Denies HA, scotoma, RUQ pain, SOB, chest pain. Reports GFM. Denies ctx, abdominal pain, VB, LOF.  - Reassuring cEFM thus far on L&D. Will cont to monitor throughout night per MFM plan.    D/w Dr. Anton Garza MD PGY-2

## 2024-02-06 NOTE — PROGRESS NOTES
Antepartum Progress Note    Assessment/Plan   Emelyn Perez is a 33 y.o.  at 25w2d. AGUILAR: 2024, by Ultrasound.     34yo  at 25w2d by IVF dating admitted with preE with SF & FGR.     PreE with SF  - Diagnosed by severe range BP requiring IV tx, has also had severe ranges >4 hrs apart  - S/p IV lab 20/40 at [facility] ; last IV tx here  pm IV lab 20  - HELLP labs negative over multiple sets with last , P:C 1.43  - For twice weekly HELLP labs (Mon/Thurs) while inpatient if BP & symptoms stable, repeat appropriate yesteday  - s/p 12hr Mag gtt on admission  - Current BP Regimen: nifed 60 BID and labetalol 600 TID (/ PM)     FGR, rhizomelic shortening of long bones  - EFW (): 526g (4%ile), normal Dopplers  - Twice weekly BPP and Dopplers (Mon/Thurs)  - Genetics consulted: Discussed options for possible amniocentesis, cord blood karyotyping  vs fetal karyotyping with CMA, genetic panel or RONEL  - AEDF 24, one loop of rEDF on , persistent AEDF on   - Continue cEFM in s/o AEDF     Fetal status  - AGA, reassuring, cat I  - Dopplers as above  - BMZ -, s/p rBMZ -  - s/p NICU cs      Routine:  - GBS negative   - TDAP: offered  given increased likelihood of PTD, not yet received yet  - Flu 10/23  - 1hr: not yet completed, POCT BG checks wnl in the s/o BMZ  - BCM: declines    Dispo: transfer to [facility] for close inpatient monitoring    Seen and d/w Dr. Kai Dickerson MD    Principal Problem:    Severe pre-eclampsia in second trimester  Active Problems:    Poor fetal growth affecting management of mother in second trimester    Pregnancy Problems (from 10/20/23 to present)       Problem Noted Resolved    Severe pre-eclampsia in second trimester 2024 by Edenilson Rodriguez MD No    Priority:  Medium      Poor fetal growth affecting management of mother in second trimester 2024 by Edenilson Rodriguez MD No    Priority:  Medium      Marginal insertion of umbilical cord  affecting management of mother 1/16/2024 by Radha Abad MD No    Priority:  Medium      Overview Signed 1/16/2024  2:58 PM by Radha Abad MD     -serial growth         Encounter for supervision of normal first pregnancy in first trimester 10/20/2023 by Radha Abad MD No    Priority:  Medium      Overview Addendum 11/17/2023  5:11 PM by Radha Abad MD     -s/p flu shot 10/20  -declines NIPS                 Subjective   Pt resting in bed. Denies VB, LOF, CTX. Good FM.     Objective   Allergies:   Patient has no known allergies.    Last Vitals:  Temp Pulse Resp BP MAP Pulse Ox   36.4 °C (97.5 °F) 79 16 130/76   97 %     Vitals Min/Max Last 24 Hours:  Temp  Min: 36.1 °C (97 °F)  Max: 37 °C (98.6 °F)  Pulse  Min: 59  Max: 98  Resp  Min: 16  Max: 18  BP  Min: 129/77  Max: 148/89    Intake/Output:   No intake or output data in the 24 hours ending 02/06/24 0859    Physical exam:  General:  AAOx3, No acute distress  Cardiovascular: Warm and well perfused  Respiratory: Normal respiratory effort   Abdominal:  Soft, gravid, non-tender  Skin: No rashes or lesions visualized    Lab Data:  Lab Results   Component Value Date    WBC 15.1 (H) 02/05/2024    HGB 12.3 02/05/2024    HCT 36.8 02/05/2024     02/05/2024     Lab Results   Component Value Date    GLUCOSE 112 (H) 02/05/2024     02/05/2024    K 3.8 02/05/2024     02/05/2024    CO2 20 (L) 02/05/2024    ANIONGAP 13 02/05/2024    BUN 17 02/05/2024    CREATININE 0.65 02/05/2024    EGFR >90 02/05/2024    CALCIUM 9.0 02/05/2024    ALBUMIN 3.3 (L) 02/05/2024    PROT 6.0 (L) 02/05/2024    ALKPHOS 49 02/05/2024    ALT 24 02/05/2024    AST 20 02/05/2024    BILITOT 0.3 02/05/2024

## 2024-02-07 ENCOUNTER — APPOINTMENT (OUTPATIENT)
Dept: RADIOLOGY | Facility: HOSPITAL | Age: 34
End: 2024-02-07
Payer: COMMERCIAL

## 2024-02-07 PROCEDURE — 2500000004 HC RX 250 GENERAL PHARMACY W/ HCPCS (ALT 636 FOR OP/ED)

## 2024-02-07 PROCEDURE — 59025 FETAL NON-STRESS TEST: CPT | Mod: GC | Performed by: STUDENT IN AN ORGANIZED HEALTH CARE EDUCATION/TRAINING PROGRAM

## 2024-02-07 PROCEDURE — 76999 ECHO EXAMINATION PROCEDURE: CPT

## 2024-02-07 PROCEDURE — 76820 UMBILICAL ARTERY ECHO: CPT

## 2024-02-07 PROCEDURE — 1210000001 HC SEMI-PRIVATE ROOM DAILY

## 2024-02-07 PROCEDURE — 59025 FETAL NON-STRESS TEST: CPT | Performed by: STUDENT IN AN ORGANIZED HEALTH CARE EDUCATION/TRAINING PROGRAM

## 2024-02-07 PROCEDURE — 99233 SBSQ HOSP IP/OBS HIGH 50: CPT | Performed by: STUDENT IN AN ORGANIZED HEALTH CARE EDUCATION/TRAINING PROGRAM

## 2024-02-07 PROCEDURE — 2500000001 HC RX 250 WO HCPCS SELF ADMINISTERED DRUGS (ALT 637 FOR MEDICARE OP)

## 2024-02-07 PROCEDURE — 76818 FETAL BIOPHYS PROFILE W/NST: CPT

## 2024-02-07 RX ADMIN — NIFEDIPINE 60 MG: 60 TABLET, EXTENDED RELEASE ORAL at 20:46

## 2024-02-07 RX ADMIN — LABETALOL HYDROCHLORIDE 600 MG: 200 TABLET, FILM COATED ORAL at 06:20

## 2024-02-07 RX ADMIN — NIFEDIPINE 60 MG: 60 TABLET, EXTENDED RELEASE ORAL at 08:39

## 2024-02-07 RX ADMIN — LABETALOL HYDROCHLORIDE 600 MG: 200 TABLET, FILM COATED ORAL at 18:04

## 2024-02-07 ASSESSMENT — PAIN - FUNCTIONAL ASSESSMENT
PAIN_FUNCTIONAL_ASSESSMENT: 0-10

## 2024-02-07 ASSESSMENT — PAIN SCALES - GENERAL
PAINLEVEL_OUTOF10: 0 - NO PAIN

## 2024-02-07 NOTE — PROGRESS NOTES
Emelyn Perez is a 33 y.o. female on day 12 of admission presenting with Severe pre-eclampsia in second trimester.    Follow up social work visit to assess for additional needs and provide support. Initial social work assessment completed on 24.  Ms. Perez explained that she is coping with the admission with the support of her , parents, in laws, family and friends.  Many people are checking in on her throughout the day, she feels well supported.  Acknowledged the situational stress and anxiety, denied any hx of anxiety and depression.   SW discussed supports within  such as  mental health support.     Mom denies having any specific needs for social work to address. Social work will continue to follow and provide support throughout this admission.      DVAIN Landrum

## 2024-02-07 NOTE — CARE PLAN
The patient's goals for the shift include healthy mom; healthy baby    The clinical goals for the shift include FHR remains reassuring    Patient remained stable this shift. Patient endorses good FM and FHR remains reassuring. Patient denies VB and any leakage of fluid. Patient VS and assessments WNL. Patient does have SPEC and did not have any SF this shift. Will continue to monitor.

## 2024-02-07 NOTE — PROGRESS NOTES
ANTEPARTUM PROGRESS NOTE   2024, 8:44 AM      SUBJECTIVE:  No chest pain, shortness of breath, vision changes, headache, or RUQ pain. Good fetal movement, no contractions, vaginal bleeding, or loss of fluid        OBJECTIVE:   Vitals:    24 0421   BP: 126/66   Pulse: 77   Resp: 18   Temp: 37.1 °C (98.8 °F)   SpO2: 96%     Gen: NAD  HEENT: NCAT  Resp: normal work of breathing on room air  Card: regular rate  Neuro: grossly intact   Psych: approipriate  Motor: moving all extremities spontaneously   Abdomen: Non distended       NST   / mod anjana/+ accels/- decels , AGA   [place]: quiet      ASSESSMENT AND PLAN: 34yo  at 25w3d by IVF dating admitted with preE with SF & FGR-- doing well      sPEC   - Diagnosed by severe range BP requiring IV tx, has also had severe ranges >4 hrs apart  - S/p IV lab 20/40 at [facility] ; last IV tx here  pm IV lab 20  - HELLP labs negative over multiple sets with last , P:C 1.43  - For twice weekly HELLP labs (Mon/Thurs) while inpatient if BP & symptoms stable, repeat appropriate yesteday  - s/p 12hr Mag gtt on admission  - Current BP Regimen: nifed 60 BID + Lab 600 BID   - Continued recommendation for inpatient monitoring and recommendation for delivery at 34.0 wga at the latest, however would deliver before 34wks for indications including but not limited to rapid uptitration of antihypertensives and concern for fetal wellbeing in the setting of now known FGR.      FGR, rhizomelic shortening of long bones  - EFW (): 526g (4%ile), normal Dopplers  - Twice weekly BPP and Dopplers (Mon/Thurs)  - Genetics consulted: Discussed options for possible amniocentesis, cord blood karyotyping  vs fetal karyotyping with CMA, genetic panel or RONEL  - AEDF 24, one loop of rEDF on , dops pending this morning     Fetal status  - AGA NST this AM  - Dopplers as above, pending today   - BMZ -, s/p rBMZ x2  - s/p NICU cs , offered patient re-consult       Routine:  - GBS negative 1/27  - TDAP: offered 1/29 given increased likelihood of PTD, not yet received yet  - Flu 10/23  - 1hr: not yet completed, POCT BG checks wnl in the s/o BMZ  - BCM: declines     Dispo: Stable, but guarded position given fetal status- will continue to monitor closely     To be d/w Dr. Anton Domingo MD  OB/GYN PGY3   M [id number]

## 2024-02-08 ENCOUNTER — APPOINTMENT (OUTPATIENT)
Dept: RADIOLOGY | Facility: HOSPITAL | Age: 34
End: 2024-02-08
Payer: COMMERCIAL

## 2024-02-08 LAB
ABO GROUP (TYPE) IN BLOOD: NORMAL
ALBUMIN SERPL BCP-MCNC: 3 G/DL (ref 3.4–5)
ALP SERPL-CCNC: 48 U/L (ref 33–110)
ALT SERPL W P-5'-P-CCNC: 28 U/L (ref 7–45)
ANION GAP SERPL CALC-SCNC: 12 MMOL/L (ref 10–20)
ANTIBODY SCREEN: NORMAL
AST SERPL W P-5'-P-CCNC: 19 U/L (ref 9–39)
BILIRUB SERPL-MCNC: 0.3 MG/DL (ref 0–1.2)
BUN SERPL-MCNC: 22 MG/DL (ref 6–23)
CALCIUM SERPL-MCNC: 8.5 MG/DL (ref 8.6–10.6)
CHLORIDE SERPL-SCNC: 107 MMOL/L (ref 98–107)
CO2 SERPL-SCNC: 21 MMOL/L (ref 21–32)
CREAT SERPL-MCNC: 0.61 MG/DL (ref 0.5–1.05)
EGFRCR SERPLBLD CKD-EPI 2021: >90 ML/MIN/1.73M*2
ERYTHROCYTE [DISTWIDTH] IN BLOOD BY AUTOMATED COUNT: 13.2 % (ref 11.5–14.5)
GLUCOSE SERPL-MCNC: 80 MG/DL (ref 74–99)
HCT VFR BLD AUTO: 35.2 % (ref 36–46)
HGB BLD-MCNC: 12 G/DL (ref 12–16)
MCH RBC QN AUTO: 30.6 PG (ref 26–34)
MCHC RBC AUTO-ENTMCNC: 34.1 G/DL (ref 32–36)
MCV RBC AUTO: 90 FL (ref 80–100)
NRBC BLD-RTO: 0 /100 WBCS (ref 0–0)
PLATELET # BLD AUTO: 281 X10*3/UL (ref 150–450)
POTASSIUM SERPL-SCNC: 4 MMOL/L (ref 3.5–5.3)
PROT SERPL-MCNC: 5.7 G/DL (ref 6.4–8.2)
RBC # BLD AUTO: 3.92 X10*6/UL (ref 4–5.2)
RH FACTOR (ANTIGEN D): NORMAL
SODIUM SERPL-SCNC: 136 MMOL/L (ref 136–145)
WBC # BLD AUTO: 18.9 X10*3/UL (ref 4.4–11.3)

## 2024-02-08 PROCEDURE — 36415 COLL VENOUS BLD VENIPUNCTURE: CPT

## 2024-02-08 PROCEDURE — 76820 UMBILICAL ARTERY ECHO: CPT

## 2024-02-08 PROCEDURE — 85027 COMPLETE CBC AUTOMATED: CPT

## 2024-02-08 PROCEDURE — 99233 SBSQ HOSP IP/OBS HIGH 50: CPT

## 2024-02-08 PROCEDURE — 59025 FETAL NON-STRESS TEST: CPT

## 2024-02-08 PROCEDURE — 2500000001 HC RX 250 WO HCPCS SELF ADMINISTERED DRUGS (ALT 637 FOR MEDICARE OP)

## 2024-02-08 PROCEDURE — 76999 ECHO EXAMINATION PROCEDURE: CPT

## 2024-02-08 PROCEDURE — 80053 COMPREHEN METABOLIC PANEL: CPT

## 2024-02-08 PROCEDURE — 59025 FETAL NON-STRESS TEST: CPT | Mod: GC

## 2024-02-08 PROCEDURE — 2500000004 HC RX 250 GENERAL PHARMACY W/ HCPCS (ALT 636 FOR OP/ED)

## 2024-02-08 PROCEDURE — 1210000001 HC SEMI-PRIVATE ROOM DAILY

## 2024-02-08 PROCEDURE — 86901 BLOOD TYPING SEROLOGIC RH(D): CPT

## 2024-02-08 RX ORDER — ACETAMINOPHEN 325 MG/1
975 TABLET ORAL ONCE
Status: COMPLETED | OUTPATIENT
Start: 2024-02-08 | End: 2024-02-08

## 2024-02-08 RX ADMIN — LABETALOL HYDROCHLORIDE 600 MG: 200 TABLET, FILM COATED ORAL at 18:11

## 2024-02-08 RX ADMIN — ACETAMINOPHEN 975 MG: 325 TABLET ORAL at 05:38

## 2024-02-08 RX ADMIN — NIFEDIPINE 60 MG: 60 TABLET, EXTENDED RELEASE ORAL at 08:30

## 2024-02-08 RX ADMIN — LABETALOL HYDROCHLORIDE 600 MG: 200 TABLET, FILM COATED ORAL at 05:38

## 2024-02-08 RX ADMIN — NIFEDIPINE 60 MG: 60 TABLET, EXTENDED RELEASE ORAL at 21:33

## 2024-02-08 ASSESSMENT — PAIN - FUNCTIONAL ASSESSMENT
PAIN_FUNCTIONAL_ASSESSMENT: 0-10

## 2024-02-08 ASSESSMENT — PAIN DESCRIPTION - DESCRIPTORS
DESCRIPTORS: HEADACHE
DESCRIPTORS: HEADACHE

## 2024-02-08 ASSESSMENT — PAIN SCALES - GENERAL
PAINLEVEL_OUTOF10: 0 - NO PAIN
PAINLEVEL_OUTOF10: 4
PAINLEVEL_OUTOF10: 0 - NO PAIN
PAINLEVEL_OUTOF10: 3
PAINLEVEL_OUTOF10: 0 - NO PAIN

## 2024-02-08 NOTE — CARE PLAN
The patient's goals for the shift include healthy mom; healthy baby    The clinical goals for the shift include FHR remains reassuring

## 2024-02-09 ENCOUNTER — APPOINTMENT (OUTPATIENT)
Dept: RADIOLOGY | Facility: HOSPITAL | Age: 34
End: 2024-02-09
Payer: COMMERCIAL

## 2024-02-09 PROCEDURE — 90471 IMMUNIZATION ADMIN: CPT

## 2024-02-09 PROCEDURE — 59025 FETAL NON-STRESS TEST: CPT | Performed by: STUDENT IN AN ORGANIZED HEALTH CARE EDUCATION/TRAINING PROGRAM

## 2024-02-09 PROCEDURE — 1210000001 HC SEMI-PRIVATE ROOM DAILY

## 2024-02-09 PROCEDURE — 2500000004 HC RX 250 GENERAL PHARMACY W/ HCPCS (ALT 636 FOR OP/ED)

## 2024-02-09 PROCEDURE — 2500000001 HC RX 250 WO HCPCS SELF ADMINISTERED DRUGS (ALT 637 FOR MEDICARE OP)

## 2024-02-09 PROCEDURE — 90715 TDAP VACCINE 7 YRS/> IM: CPT

## 2024-02-09 PROCEDURE — 76820 UMBILICAL ARTERY ECHO: CPT

## 2024-02-09 PROCEDURE — 99233 SBSQ HOSP IP/OBS HIGH 50: CPT | Performed by: STUDENT IN AN ORGANIZED HEALTH CARE EDUCATION/TRAINING PROGRAM

## 2024-02-09 PROCEDURE — 59025 FETAL NON-STRESS TEST: CPT | Mod: GC | Performed by: STUDENT IN AN ORGANIZED HEALTH CARE EDUCATION/TRAINING PROGRAM

## 2024-02-09 PROCEDURE — 2500000002 HC RX 250 W HCPCS SELF ADMINISTERED DRUGS (ALT 637 FOR MEDICARE OP, ALT 636 FOR OP/ED)

## 2024-02-09 PROCEDURE — 76999 ECHO EXAMINATION PROCEDURE: CPT

## 2024-02-09 PROCEDURE — 76818 FETAL BIOPHYS PROFILE W/NST: CPT

## 2024-02-09 RX ADMIN — NIFEDIPINE 60 MG: 60 TABLET, EXTENDED RELEASE ORAL at 20:36

## 2024-02-09 RX ADMIN — LABETALOL HYDROCHLORIDE 600 MG: 200 TABLET, FILM COATED ORAL at 06:38

## 2024-02-09 RX ADMIN — NIFEDIPINE 60 MG: 60 TABLET, EXTENDED RELEASE ORAL at 08:49

## 2024-02-09 RX ADMIN — LABETALOL HYDROCHLORIDE 600 MG: 200 TABLET, FILM COATED ORAL at 18:09

## 2024-02-09 RX ADMIN — TETANUS TOXOID, REDUCED DIPHTHERIA TOXOID AND ACELLULAR PERTUSSIS VACCINE, ADSORBED 0.5 ML: 5; 2.5; 8; 8; 2.5 SUSPENSION INTRAMUSCULAR at 13:42

## 2024-02-09 ASSESSMENT — PAIN - FUNCTIONAL ASSESSMENT
PAIN_FUNCTIONAL_ASSESSMENT: 0-10

## 2024-02-09 ASSESSMENT — PAIN SCALES - GENERAL
PAINLEVEL_OUTOF10: 0 - NO PAIN

## 2024-02-09 NOTE — CARE PLAN
The patient's goals for the shift include to relax/hang out with my mom and not experience any s/s pec    The clinical goals for the shift include for patient to have a reactive nst in the morning along with stable BP's throughout shift    BP's stable overnight with no s/s pec noted. Vitals stable otherwise and patient has not complained of any pain. FHR stable and patient on monitor for nst at this time. Patient's mother spent the night and patient appears to have excellent support. Resting comfortably. Will continue to monitor for any changes.

## 2024-02-09 NOTE — PROGRESS NOTES
ANTEPARTUM PROGRESS NOTE   24, 8:32 AM    SUBJECTIVE:  No chest pain, shortness of breath, vision changes, headache, or RUQ pain.     Good fetal movement, no contractions, vaginal bleeding, or loss of fluid        OBJECTIVE:   Vitals:    24 0746   BP: 131/77   Pulse: 75   Resp: 16   Temp: 37 °C (98.6 °F)   SpO2: 97%     Gen: NAD  HEENT: NCAT  Resp: normal work of breathing on room air  Card: regular rate  Neuro: grossly intact   Psych: approipriate  Motor: moving all extremities spontaneously   Abdomen: Non distended, gravid, nontender to palpation    NST   / mod anjana/- accels/- decels , AGA   [place]: quiet      ASSESSMENT AND PLAN: 32yo  at 25w5d by IVF dating admitted with preE with SF & FGR     sPEC   - Diagnosed by severe range BP requiring IV tx, has also had severe ranges >4 hrs apart  - S/p IV lab 20/40 at [facility] ; last IV tx here  pm IV lab 20  - HELLP labs negative over multiple sets with last , P:C 1.43  - For twice weekly HELLP labs (Mon/Thurs) while inpatient if BP & symptoms stable  - s/p 12hr Mag gtt on admission  - Current BP Regimen: nifed 60 BID + Lab 600 BID   - Continued recommendation for inpatient monitoring and recommendation for delivery at 34.0 wga at the latest, however would deliver before 34wks for indications including but not limited to rapid uptitration of antihypertensives and concern for fetal wellbeing in the setting of now known FGR.      FGR, rhizomelic shortening of long bones  - EFW (): 526g (4%ile), normal Dopplers  - Daily BPP and dopplers  - Genetics consulted: Discussed options for possible amniocentesis, cord blood karyotyping  vs fetal karyotyping with CMA, genetic panel or RONEL  - AEDF 24, one loop of rEDF on , dopplers AEDF this AM     Fetal status  - AGA NST this AM  - BID NSTs  - Dopplers as above  - BMZ -, s/p rBMZ -  - s/p NICU cs , offered patient re-consult patient declined at this time     Routine:  - GBS  Psychiatry Outpatient Clinic Progress Note    Source of History: Review of chart. Interview of Sigrid Francisco at the clinic. Others present during the interview: None    Time: 35 minutes were spent on the day of service. Activities included chart review, interview, and documentation.    History: Sigrid Francisco reports that she continues to experience anxiety. She was unable to tolerate a higher dosing of venlafaxine.    Ms. Francisco last met with this provider on November 11. At that time, venlafaxine was continued at 75 mg daily to treat anxiety and depression, and to prevent panic attacks.     On November 29, the patient called to report that she was taking venlafaxine XR 75 mg every other day because of side effects like feeling \"jittery, dizzy, having headaches, and feel like crawling out of (her) skin.\" She was advised to take venlafaxine XR 37.5 mg daily because alternate day dosing may cause withdrawal symptoms similar to her reported symptoms.    Since that time, she has taken venlafaxine XR 37.5 mg daily and the symptoms have improved. However, she continues to experience intermittent anxiety. Her mood and motivation are lower than baseline. It remains a challenge to stay focused and make decisions when anxious.      Medication options were discussed. Her use of venlafaxine XR above 37.5 mg correlates with feeling more on edge. Use of escitalopram has correlated with possible emotional numbing and lower motivation. When the patient took escitalopram 5 mg with venlafaxine XR 37.5 mg in July 2021, she tolerated the combination well, and reported early benefit such as a more positive mood, lower anxiety, and more energy.     Cognitive symptoms (review): Ms. Francisco has completed more than two years of college and worked in customer service with management responsibilities. She reports a lifelong history of difficulty remaining focused, wandering thoughts, and forgetfulness. She  negative 1/27  - TDAP: re offered today  - Flu 10/23  - 1hr: not yet completed, POCT BG checks wnl in the s/o BMZ  - BCM: declines     Dispo: Stable, for daily BPP/dopplers and BID NST    To be d/w Dr. Anton Domingo MD   OB/GYN PGY3  MFM [id number]   notes a family history of ADHD. Over the past few years, Ms. Francisco has experienced increased difficulty attending to a conversation or a project due to ruminative thoughts and distractibility. She has also noticed more difficulty with short term memory and word finding. She remains independent with ADLs and IADLs. On 07/01/2021, a MOCA score was 28/30. MIS score 14/15. No vitamin B12 or folate levels. Head CT in 2018 was benign.     Side effects from current medications: None known  Abuse or inappropriate use of current medications: None  [place] PDMP review: Alprazolam 0.25 mg #20 on 10/26/2021  Past medication trials: Escitalopram 5 mg from 2013 and increased to 10 mg in 2015. Tapered herself off escitalopram in January 2018 and restarted escitalopram after anxiety was noted during an ED visit for a \"meltdown\" in March 2018. Low energy and motivation while taking escitalopram plus possible emotional numbing. Alprazolam 0.25 mg was first prescribed by Dr. Cox in February 2018, due to daily headaches, dizziness, and persistent anxiety. Venlafaxine XR 37.5 mg started in early July 2021 and increased to 75 mg at the end of July 2021. Venlafaxine XR 75 mg correlated with feeling more on edge. In December 2021, venlafaxine XR 37.5 mg was prescribed with escitalopram 10 mg.      Substance use: Drank alcohol heavily with her ex- until they  after four years. She describes this as \"alcohol abuse.\" Alcohol use now is rare and moderate. No history of street drug use.     Nonpharmacologic treatment is individual therapy with Kelsea Neal LCSW     Past Psychiatric, Social and Family History:   Reviewed in EMR and updated   Relevant psychiatric history: met with a counselor during her first marriage. Met with a psychotherapist about ten years ago and the sessions focused too much on the past.  Stressors: Health of parents and grandson. Travel to [place] to assist son and his family.   Social/Support  network: Siblings. Father, Isaac. Son, Usman. . Lives alone. Likes to walk and to read     Medical History:  Reviewed in chart, including Care Everywhere.   No new notes in Care Everywhere  PCP is Dr. Derek Cox  Reactive airway disease and recurrent symptoms December to April  Pericardial effusion  Pulmonary nodules  Cataract surgery 2019  ED evaluation on 11/02/2021 for atypical chest pain with no acute cardiac issues     Medications:  Current Outpatient Medications   Medication Sig   • venlafaxine XR (EFFEXOR XR) 37.5 MG 24 hr capsule Take 1 capsule by mouth daily.   • ALPRAZolam (XANAX) 0.25 MG tablet Take 1 tab po daily prn sleep or anxiety   • cholecalciferol (VITAMIN D3) 1000 UNITS tablet Take 1,000 Units by mouth daily.   • albuterol (VENTOLIN HFA) 108 (90 Base) MCG/ACT inhaler Inhale 2 puffs into the lungs every 4 hours as needed for Shortness of Breath or Wheezing.   • loratadine (CLARITIN) 10 MG tablet Take 10 mg by mouth daily. Indications: as needed      No current facility-administered medications for this visit.       ALLERGIES:  ALLERGIES:   Allergen Reactions   • Latex   (Environmental) PRURITUS   • Cat Dander Other (See Comments)   • Dog Dander Other (See Comments)   • Seasonal Other (See Comments)     Sneezing; congestion       Vitals:  There were no vitals taken for this visit.  Ht Readings from Last 1 Encounters:   12/06/21 4' 9\" (1.448 m)     Wt Readings from Last 1 Encounters:   12/06/21 48.9 kg (107 lb 12.8 oz)       Laboratory Tests Or Other Studies:  Labs, ECG, and imaging reviewed.   Of relevance:    11/02/2021 CBC WNL  11/02/2021 CMP notable for Na 133 mmol/L and chloride slightly low. GFR, Albumin, TSP, LFTs, Ca all WNL  11/02/2021 CK WNL. Troponin I WNL     04/22/2021 NT ProBNP and Troponin negative. PT and PTT WNL  04/22/2021 TSH 1.460 microU/mL  04/22/2021 Mg WNL  04/22/2021 Lipase WNL     01/23/2021 Lipid panel WNL     HgbA1c: Not found  Vitamin B12: Not  found   Folate: Not found     EC2021 NSR. Low voltage QRS. QTc 430 msec     Head CT WO Contrast: 03/10/2018  1. No evidence of acute intrarenal process, bleed or mass.  2. Findings consistent with arachnoid cyst in the posterior midline region of the posterior fossa    Mental Status Examination:  Good hygiene. Appears well nourished. Calm and polite. Not guarded nor hostile. Good eye contact. Gait is normal pace and base. No assistive device. No tremor nor other abnormal movement. Awake and alert. Good sustained attention during 1:1 conversation. Oriented to person, place and time. Cooperative and appears reliable. Consistent answers. Speech has normal rate, tone, and articulation. Mood is described as euthymic. Visible affect is appropriate. Thought process is linear and goal directed. Flow of thought is logical. No delusional ideas and no predominant suspiciousness. No excessive self criticism. No auditory or visual hallucinations reported. Does not appear to be responding to internal stimuli. Good recall for chronological personal history and details of care. Good insight, based on appropriate recognition of impact of psychiatric symptoms and need for treatment. Judgement appears to be good, based on history, reasonable decision making during the interview, and appropriate interpersonal behavior. No suicidal thoughts nor aggressive ideation are reported. Future oriented and proactive. Motivation to pursue care appears to be good    Impression and Medical Decision Making:   Ms. Francisco has been experiencing side effects from antidepressants at higher dosing. A combination of two antidepressants at low dosing will be prescribed to improve tolerability and to maximize benefit. Individual therapy is also likely to assist with reducing anxiety.    Diagnoses:  Generalized anxiety disorder  Panic attacks. Rule out panic disorder.  Adjustment disorder with depressed mood  Rule out ADHD, inattentive  type  Primary insomnia    Plan:  1. Please call the clinic or the ED if you have questions, if your symptoms worsen, or if you have thoughts of harming yourself or others.  2. Continue individual therapy with Kelsea Neal LCSW.   3. Continue venlafaxine XR 37.5 mg in the morning for anxiety, mild depression, and inattention.  4. Restart escitalopram 10 mg daily for anxiety and depression. Please call prior to the next appointment if you experience uncomfortable side effects from this combination. The benefits may gradually increase over the next three months.  5. Continue alprazolam 0.25 mg once a day when needed for anxiety or insomnia when other approaches do not work  6. Return in three months. Please call and move the appointment forward if you would like to be seen before that time.    Risk Assessment:   no indication of acutely increased risk of harm to self or others above personal baseline    Alix Mccollum MD

## 2024-02-10 PROCEDURE — 99233 SBSQ HOSP IP/OBS HIGH 50: CPT

## 2024-02-10 PROCEDURE — 2500000002 HC RX 250 W HCPCS SELF ADMINISTERED DRUGS (ALT 637 FOR MEDICARE OP, ALT 636 FOR OP/ED): Performed by: STUDENT IN AN ORGANIZED HEALTH CARE EDUCATION/TRAINING PROGRAM

## 2024-02-10 PROCEDURE — 1210000001 HC SEMI-PRIVATE ROOM DAILY

## 2024-02-10 PROCEDURE — 2500000001 HC RX 250 WO HCPCS SELF ADMINISTERED DRUGS (ALT 637 FOR MEDICARE OP): Performed by: STUDENT IN AN ORGANIZED HEALTH CARE EDUCATION/TRAINING PROGRAM

## 2024-02-10 PROCEDURE — 2500000002 HC RX 250 W HCPCS SELF ADMINISTERED DRUGS (ALT 637 FOR MEDICARE OP, ALT 636 FOR OP/ED)

## 2024-02-10 PROCEDURE — 59025 FETAL NON-STRESS TEST: CPT | Mod: GC

## 2024-02-10 PROCEDURE — 59025 FETAL NON-STRESS TEST: CPT

## 2024-02-10 PROCEDURE — 2500000001 HC RX 250 WO HCPCS SELF ADMINISTERED DRUGS (ALT 637 FOR MEDICARE OP)

## 2024-02-10 RX ADMIN — NIFEDIPINE 60 MG: 60 TABLET, EXTENDED RELEASE ORAL at 21:30

## 2024-02-10 RX ADMIN — LABETALOL HYDROCHLORIDE 600 MG: 200 TABLET, FILM COATED ORAL at 18:11

## 2024-02-10 RX ADMIN — LABETALOL HYDROCHLORIDE 600 MG: 200 TABLET, FILM COATED ORAL at 05:09

## 2024-02-10 RX ADMIN — NIFEDIPINE 60 MG: 60 TABLET, EXTENDED RELEASE ORAL at 08:58

## 2024-02-10 ASSESSMENT — PAIN - FUNCTIONAL ASSESSMENT
PAIN_FUNCTIONAL_ASSESSMENT: 0-10

## 2024-02-10 ASSESSMENT — PAIN SCALES - GENERAL
PAINLEVEL_OUTOF10: 0 - NO PAIN

## 2024-02-10 NOTE — PROGRESS NOTES
ANTEPARTUM PROGRESS NOTE   02/10/24, 6:34 AM    SUBJECTIVE:  No chest pain, shortness of breath, vision changes, headache, or RUQ pain.     Good fetal movement, no contractions, vaginal bleeding, or loss of fluid        OBJECTIVE:   Vitals:    02/10/24 0005   BP: 125/76   Pulse: 83   Resp: 16   Temp: 37.1 °C (98.8 °F)   SpO2: 97%     Gen: NAD  HEENT: NCAT  Resp: normal work of breathing on room air  Card: regular rate  Neuro: grossly intact   Psych: approipriate  Motor: moving all extremities spontaneously   Abdomen: Non distended, gravid, nontender to palpation    NST   / mod anjana/- accels/- decels , AGA   [place]: quiet      ASSESSMENT AND PLAN: 32yo  at 25w6d by IVF dating admitted with preE with SF & FGR     sPEC   - Diagnosed by severe range BP requiring IV tx, has also had severe ranges >4 hrs apart  - S/p IV lab 20/40 at [facility] ; last IV tx here  pm IV lab 20  - HELLP labs negative over multiple sets with last , P:C 1.43  - For twice weekly HELLP labs (Mon/Thurs) while inpatient if BP & symptoms stable  - s/p 12hr Mag gtt on admission  - Current BP Regimen: nifed 60 BID + Lab 600 BID   - Continued recommendation for inpatient monitoring and recommendation for delivery at 34.0 wga at the latest, however would deliver before 34wks for indications including but not limited to rapid uptitration of antihypertensives and concern for fetal wellbeing in the setting of now known FGR.      FGR, rhizomelic shortening of long bones  - EFW (): 526g (4%ile), normal Dopplers  - Daily BPP and dopplers  - Genetics consulted: Discussed options for possible amniocentesis, cord blood karyotyping  vs fetal karyotyping with CMA, genetic panel or RONEL  - AEDF 24, one loop of rEDF on , dopplers AEDF this AM     Fetal status  - AGA NST this AM  - BID NSTs  - Dopplers as above  - BMZ -, s/p rBMZ -  - s/p NICU cs , offered patient re-consult patient declined at this time     Routine:  - GBS  negative 1/27  - TDAP: re offered today  - Flu 10/23  - 1hr: not yet completed, POCT BG checks wnl in the s/o BMZ  - BCM: declines     Dispo: Stable, for daily BPP/dopplers and BID NST    To be d/w Dr. Anton Salazar MD   OB/GYN PGY3  MFM [id number]

## 2024-02-10 NOTE — CARE PLAN
Problem: Antepartum  Goal: FHR remains reassuring  Outcome: Progressing  Goal: Minimize anxiety/maximize coping  Outcome: Progressing     Problem: Hypertensive Disorder of Pregnancy (HDP)  Goal: Minimal s/sx of HDP and BP<160/110  Outcome: Progressing      Pt was stable this shift. Her VS were stable. She went to L&D briefly for prolonged monitoring, but came back to [facility]. She had no bleeding cramping or contractions

## 2024-02-10 NOTE — CARE PLAN
The patient's goals for the shift include remian pregnant and no s/sx of worsening HDP.    The clinical goals for the shift include keep BP <160/110 and stable afternoon NST

## 2024-02-10 NOTE — CARE PLAN
The patient's goals for the shift include remain pregnant    The clinical goals for the shift include BP <160/110      Problem: Antepartum  Goal: Maintain pregnancy as long as maternal and/or fetal condition is stable  2/10/2024 0653 by Maty Ramon RN  Outcome: Progressing     Problem: Hypertensive Disorder of Pregnancy (HDP)  Goal: Minimal s/sx of HDP and BP<160/110  2/10/2024 0653 by Maty Ramon RN  Outcome: Progressing

## 2024-02-11 PROCEDURE — 1210000001 HC SEMI-PRIVATE ROOM DAILY

## 2024-02-11 PROCEDURE — 2500000002 HC RX 250 W HCPCS SELF ADMINISTERED DRUGS (ALT 637 FOR MEDICARE OP, ALT 636 FOR OP/ED): Performed by: STUDENT IN AN ORGANIZED HEALTH CARE EDUCATION/TRAINING PROGRAM

## 2024-02-11 PROCEDURE — 59025 FETAL NON-STRESS TEST: CPT

## 2024-02-11 PROCEDURE — 59025 FETAL NON-STRESS TEST: CPT | Mod: GC

## 2024-02-11 PROCEDURE — 99233 SBSQ HOSP IP/OBS HIGH 50: CPT

## 2024-02-11 PROCEDURE — 2500000001 HC RX 250 WO HCPCS SELF ADMINISTERED DRUGS (ALT 637 FOR MEDICARE OP): Performed by: STUDENT IN AN ORGANIZED HEALTH CARE EDUCATION/TRAINING PROGRAM

## 2024-02-11 RX ADMIN — NIFEDIPINE 60 MG: 60 TABLET, EXTENDED RELEASE ORAL at 21:15

## 2024-02-11 RX ADMIN — NIFEDIPINE 60 MG: 60 TABLET, EXTENDED RELEASE ORAL at 09:08

## 2024-02-11 RX ADMIN — LABETALOL HYDROCHLORIDE 600 MG: 200 TABLET, FILM COATED ORAL at 06:11

## 2024-02-11 RX ADMIN — LABETALOL HYDROCHLORIDE 600 MG: 200 TABLET, FILM COATED ORAL at 18:19

## 2024-02-11 ASSESSMENT — PAIN SCALES - GENERAL
PAINLEVEL_OUTOF10: 0 - NO PAIN

## 2024-02-11 ASSESSMENT — PAIN - FUNCTIONAL ASSESSMENT
PAIN_FUNCTIONAL_ASSESSMENT: 0-10

## 2024-02-11 NOTE — PROGRESS NOTES
ANTEPARTUM PROGRESS NOTE   24, 5:45 AM    SUBJECTIVE:  Pt resting in bed comfortably. She reports being able to sleep some overnight.  Good fetal movement, no contractions, vaginal bleeding, or loss of fluid        OBJECTIVE:   Vitals:    02/10/24 2340   BP: (!) 142/78   Pulse: 77   Resp: 18   Temp: 36.3 °C (97.3 °F)   SpO2: 98%     Gen: NAD  HEENT: NCAT  Resp: normal work of breathing on room air  Card: regular rate  Neuro: grossly intact   Psych: approipriate  Motor: moving all extremities spontaneously   Abdomen: Non distended, gravid, nontender to palpation    NST   / mod anjana/- accels/- decels , AGA   [place]: quiet      ASSESSMENT AND PLAN: 34yo  at 26w by IVF dating admitted with preE with SF & FGR     sPEC   - Diagnosed by severe range BP requiring IV tx, has also had severe ranges >4 hrs apart  - S/p IV lab 20/40 at [facility] ; last IV tx here  pm IV lab 20  - HELLP labs negative over multiple sets with last , P:C 1.43  - For twice weekly HELLP labs (Mon/Thurs) while inpatient if BP & symptoms stable  - s/p 12hr Mag gtt on admission  - Current BP Regimen: nifed 60 BID + Lab 600 BID   - Continued recommendation for inpatient monitoring and recommendation for delivery at 34.0 wga at the latest, however would deliver before 34wks for indications including but not limited to rapid uptitration of antihypertensives and concern for fetal wellbeing in the setting of now known FGR.      FGR, rhizomelic shortening of long bones  - EFW (): 526g (4%ile), normal Dopplers  - Daily BPP and dopplers  - Genetics consulted: Discussed options for possible amniocentesis, cord blood karyotyping  vs fetal karyotyping with CMA, genetic panel or RONEL  - AEDF 24, one loop of rEDF on , dopplers AEDF this AM     Fetal status  - AGA NST this AM  - BID NSTs  - Dopplers as above  - BMZ -, s/p rBMZ -  - s/p NICU cs , offered patient re-consult patient declined at this time     Routine:  -  GBS negative 1/27  - TDAP: re offered today  - Flu 10/23  - 1hr: not yet completed, POCT BG checks wnl in the s/o BMZ  - BCM: declines     Dispo: Stable, for daily BPP/dopplers and BID NST    To be d/w Dr. Anton Salazar MD   OB/GYN PGY3  MFM [id number]

## 2024-02-11 NOTE — CARE PLAN
Problem: Antepartum  Goal: FHR remains reassuring  Outcome: Progressing  Goal: Minimize anxiety/maximize coping  Outcome: Progressing  Goal: Maintain pregnancy as long as maternal and/or fetal condition is stable  Outcome: Progressing       Patient was stable this shift. Her VS were stable. She had no s/sx of HDP. She had no bleeding cramping or contractions

## 2024-02-12 ENCOUNTER — APPOINTMENT (OUTPATIENT)
Dept: RADIOLOGY | Facility: HOSPITAL | Age: 34
End: 2024-02-12
Payer: COMMERCIAL

## 2024-02-12 LAB
ABO GROUP (TYPE) IN BLOOD: NORMAL
ALBUMIN SERPL BCP-MCNC: 3.1 G/DL (ref 3.4–5)
ALP SERPL-CCNC: 53 U/L (ref 33–110)
ALT SERPL W P-5'-P-CCNC: 29 U/L (ref 7–45)
ANION GAP SERPL CALC-SCNC: 15 MMOL/L (ref 10–20)
ANTIBODY SCREEN: NORMAL
AST SERPL W P-5'-P-CCNC: 20 U/L (ref 9–39)
BILIRUB SERPL-MCNC: 0.3 MG/DL (ref 0–1.2)
BUN SERPL-MCNC: 15 MG/DL (ref 6–23)
CALCIUM SERPL-MCNC: 9 MG/DL (ref 8.6–10.6)
CHLORIDE SERPL-SCNC: 105 MMOL/L (ref 98–107)
CO2 SERPL-SCNC: 21 MMOL/L (ref 21–32)
CREAT SERPL-MCNC: 0.68 MG/DL (ref 0.5–1.05)
EGFRCR SERPLBLD CKD-EPI 2021: >90 ML/MIN/1.73M*2
ERYTHROCYTE [DISTWIDTH] IN BLOOD BY AUTOMATED COUNT: 13 % (ref 11.5–14.5)
GLUCOSE SERPL-MCNC: 88 MG/DL (ref 74–99)
HCT VFR BLD AUTO: 39.5 % (ref 36–46)
HGB BLD-MCNC: 13.3 G/DL (ref 12–16)
MCH RBC QN AUTO: 30 PG (ref 26–34)
MCHC RBC AUTO-ENTMCNC: 33.7 G/DL (ref 32–36)
MCV RBC AUTO: 89 FL (ref 80–100)
NRBC BLD-RTO: 0 /100 WBCS (ref 0–0)
PLATELET # BLD AUTO: 311 X10*3/UL (ref 150–450)
POTASSIUM SERPL-SCNC: 4.1 MMOL/L (ref 3.5–5.3)
PROT SERPL-MCNC: 5.7 G/DL (ref 6.4–8.2)
RBC # BLD AUTO: 4.43 X10*6/UL (ref 4–5.2)
RH FACTOR (ANTIGEN D): NORMAL
SODIUM SERPL-SCNC: 137 MMOL/L (ref 136–145)
WBC # BLD AUTO: 18.6 X10*3/UL (ref 4.4–11.3)

## 2024-02-12 PROCEDURE — 76999 ECHO EXAMINATION PROCEDURE: CPT

## 2024-02-12 PROCEDURE — 36415 COLL VENOUS BLD VENIPUNCTURE: CPT | Performed by: STUDENT IN AN ORGANIZED HEALTH CARE EDUCATION/TRAINING PROGRAM

## 2024-02-12 PROCEDURE — 85027 COMPLETE CBC AUTOMATED: CPT | Performed by: STUDENT IN AN ORGANIZED HEALTH CARE EDUCATION/TRAINING PROGRAM

## 2024-02-12 PROCEDURE — 76821 MIDDLE CEREBRAL ARTERY ECHO: CPT

## 2024-02-12 PROCEDURE — 2500000001 HC RX 250 WO HCPCS SELF ADMINISTERED DRUGS (ALT 637 FOR MEDICARE OP): Performed by: STUDENT IN AN ORGANIZED HEALTH CARE EDUCATION/TRAINING PROGRAM

## 2024-02-12 PROCEDURE — 76820 UMBILICAL ARTERY ECHO: CPT

## 2024-02-12 PROCEDURE — 1210000001 HC SEMI-PRIVATE ROOM DAILY

## 2024-02-12 PROCEDURE — 59025 FETAL NON-STRESS TEST: CPT | Mod: GC

## 2024-02-12 PROCEDURE — 80053 COMPREHEN METABOLIC PANEL: CPT | Performed by: STUDENT IN AN ORGANIZED HEALTH CARE EDUCATION/TRAINING PROGRAM

## 2024-02-12 PROCEDURE — 76818 FETAL BIOPHYS PROFILE W/NST: CPT

## 2024-02-12 PROCEDURE — 2500000002 HC RX 250 W HCPCS SELF ADMINISTERED DRUGS (ALT 637 FOR MEDICARE OP, ALT 636 FOR OP/ED): Performed by: STUDENT IN AN ORGANIZED HEALTH CARE EDUCATION/TRAINING PROGRAM

## 2024-02-12 PROCEDURE — 59025 FETAL NON-STRESS TEST: CPT

## 2024-02-12 PROCEDURE — 86901 BLOOD TYPING SEROLOGIC RH(D): CPT | Performed by: STUDENT IN AN ORGANIZED HEALTH CARE EDUCATION/TRAINING PROGRAM

## 2024-02-12 PROCEDURE — 99233 SBSQ HOSP IP/OBS HIGH 50: CPT

## 2024-02-12 RX ADMIN — LABETALOL HYDROCHLORIDE 600 MG: 200 TABLET, FILM COATED ORAL at 06:12

## 2024-02-12 RX ADMIN — LABETALOL HYDROCHLORIDE 600 MG: 200 TABLET, FILM COATED ORAL at 18:42

## 2024-02-12 RX ADMIN — NIFEDIPINE 60 MG: 60 TABLET, EXTENDED RELEASE ORAL at 09:12

## 2024-02-12 RX ADMIN — NIFEDIPINE 60 MG: 60 TABLET, EXTENDED RELEASE ORAL at 21:34

## 2024-02-12 ASSESSMENT — PAIN SCALES - GENERAL
PAINLEVEL_OUTOF10: 0 - NO PAIN

## 2024-02-12 ASSESSMENT — PAIN - FUNCTIONAL ASSESSMENT
PAIN_FUNCTIONAL_ASSESSMENT: 0-10

## 2024-02-12 NOTE — CARE PLAN
VSS t/o shift. No s/sx spec. No ctx, cramping, bleeding, or LOF. FHR WDL during spot checks and NST.       Problem: Antepartum  Goal: FHR remains reassuring  Outcome: Progressing  Goal: Minimize anxiety/maximize coping  Outcome: Progressing  Goal: Maintain pregnancy as long as maternal and/or fetal condition is stable  Outcome: Progressing  Goal: Avoid/minimize constipation  Outcome: Progressing  Goal: No decrease in circulation/VTE  Outcome: Progressing     Problem: Hypertensive Disorder of Pregnancy (HDP)  Goal: Minimal s/sx of HDP and BP<160/110  Outcome: Progressing     Problem: Pain - Adult  Goal: Verbalizes/displays adequate comfort level or baseline comfort level  Outcome: Progressing     Problem: Safety - Adult  Goal: Free from fall injury  Outcome: Progressing

## 2024-02-12 NOTE — PROGRESS NOTES
ANTEPARTUM PROGRESS NOTE   24, 7:00 AM    SUBJECTIVE:  No acute events overnight. Good fetal movement, no contractions, vaginal bleeding, or loss of fluid        OBJECTIVE:   Vitals:    24 0600   BP: 138/83   Pulse: 68   Resp: 18   Temp:    SpO2:      Gen: NAD  HEENT: NCAT  Resp: normal work of breathing on room air  Card: regular rate  Neuro: grossly intact   Psych: approipriate  Motor: moving all extremities spontaneously   Abdomen: gravid    NST   / mod anjana/- accels/- decels , AGA   [place]: quiet      ASSESSMENT AND PLAN: 32yo  at 26.1w by IVF dating admitted with preE with SF & FGR     sPEC   - Diagnosed by severe range BP requiring IV tx, has also had severe ranges >4 hrs apart  - S/p IV lab 20/40 at [facility] ; last IV tx here  pm IV lab 20  - HELLP labs negative over multiple sets with last , P:C 1.43  - For twice weekly HELLP labs (Mon/Thurs) while inpatient if BP & symptoms stable  - s/p 12hr Mag gtt on admission  - Current BP Regimen: nifed 60 BID + Lab 600 BID   - Continued recommendation for inpatient monitoring and recommendation for delivery at 34.0 wga at the latest, however would deliver before 34wks for indications including but not limited to rapid uptitration of antihypertensives and concern for fetal wellbeing in the setting of now known FGR.      FGR, rhizomelic shortening of long bones  - EFW (): 526g (4%ile), normal Dopplers  - Daily BPP and dopplers  - Genetics consulted: Discussed options for possible amniocentesis, cord blood karyotyping  vs fetal karyotyping with CMA, genetic panel or RONEL  - AEDF 24, one loop of rEDF on , dopplers pending this AM     Fetal status  - AGA NST this AM  - BID NSTs  - Dopplers as above  - BMZ -, s/p rBMZ -  - s/p NICU cs , offered patient re-consult patient declined at this time     Routine:  - GBS negative   - s/p TDAP   - Flu 10/23  - 1hr: not yet completed, POCT BG checks wnl in the s/o BMZ  -  BCM: declines     Dispo: Stable, for daily BPP/dopplers and BID NST    To be d/w Dr. Josie Byrne MD   OB/GYN PGY2  MFM [id number]

## 2024-02-13 ENCOUNTER — APPOINTMENT (OUTPATIENT)
Dept: RADIOLOGY | Facility: HOSPITAL | Age: 34
End: 2024-02-13
Payer: COMMERCIAL

## 2024-02-13 PROCEDURE — 2500000001 HC RX 250 WO HCPCS SELF ADMINISTERED DRUGS (ALT 637 FOR MEDICARE OP): Performed by: STUDENT IN AN ORGANIZED HEALTH CARE EDUCATION/TRAINING PROGRAM

## 2024-02-13 PROCEDURE — 99232 SBSQ HOSP IP/OBS MODERATE 35: CPT

## 2024-02-13 PROCEDURE — 76820 UMBILICAL ARTERY ECHO: CPT

## 2024-02-13 PROCEDURE — 1210000001 HC SEMI-PRIVATE ROOM DAILY

## 2024-02-13 PROCEDURE — 59025 FETAL NON-STRESS TEST: CPT | Mod: GC

## 2024-02-13 PROCEDURE — 76818 FETAL BIOPHYS PROFILE W/NST: CPT

## 2024-02-13 PROCEDURE — 76999 ECHO EXAMINATION PROCEDURE: CPT

## 2024-02-13 PROCEDURE — 2500000002 HC RX 250 W HCPCS SELF ADMINISTERED DRUGS (ALT 637 FOR MEDICARE OP, ALT 636 FOR OP/ED): Performed by: STUDENT IN AN ORGANIZED HEALTH CARE EDUCATION/TRAINING PROGRAM

## 2024-02-13 PROCEDURE — 76821 MIDDLE CEREBRAL ARTERY ECHO: CPT

## 2024-02-13 PROCEDURE — 59025 FETAL NON-STRESS TEST: CPT

## 2024-02-13 RX ADMIN — LABETALOL HYDROCHLORIDE 600 MG: 200 TABLET, FILM COATED ORAL at 06:28

## 2024-02-13 RX ADMIN — LABETALOL HYDROCHLORIDE 600 MG: 200 TABLET, FILM COATED ORAL at 18:09

## 2024-02-13 RX ADMIN — NIFEDIPINE 60 MG: 60 TABLET, EXTENDED RELEASE ORAL at 08:19

## 2024-02-13 RX ADMIN — NIFEDIPINE 60 MG: 60 TABLET, EXTENDED RELEASE ORAL at 20:33

## 2024-02-13 ASSESSMENT — PAIN - FUNCTIONAL ASSESSMENT
PAIN_FUNCTIONAL_ASSESSMENT: 0-10

## 2024-02-13 ASSESSMENT — PAIN SCALES - GENERAL
PAINLEVEL_OUTOF10: 0 - NO PAIN

## 2024-02-13 NOTE — CARE PLAN
The patient's goals for the shift include remain pregnant    The clinical goals for the shift include BPs <160/110 and FHR remains reassuring    Pt VS remain WNL. No c/o ctx, VB, or LOF      Problem: Hypertensive Disorder of Pregnancy (HDP)  Goal: Minimal s/sx of HDP and BP<160/110  Outcome: Progressing     Problem: Antepartum  Goal: FHR remains reassuring  Outcome: Progressing

## 2024-02-13 NOTE — PROGRESS NOTES
ANTEPARTUM PROGRESS NOTE   24, 7:18 AM    SUBJECTIVE:  No acute events overnight. Denies HA, visual changes, CP, SOB, RUQ pain, or epigastric pain.  Good fetal movement, no contractions, vaginal bleeding, or loss of fluid        OBJECTIVE:   Vitals:    24 0354   BP: 124/72   Pulse: 74   Resp: 18   Temp: 37.1 °C (98.8 °F)   SpO2: 96%     Gen: NAD  HEENT: NCAT  Resp: normal work of breathing on room air  Card: regular rate  Neuro: grossly intact   Psych: approipriate  Motor: moving all extremities spontaneously   Abdomen: gravid, nontender to palpation    NST   / mod anjana/- accels/- decels , AGA   [place]: quiet      ASSESSMENT AND PLAN: 34yo  at 26.2w by IVF dating admitted with preE with SF & FGR     sPEC   - Diagnosed by severe range BP requiring IV tx, has also had severe ranges >4 hrs apart  - S/p IV lab 20/40 at [facility] ; last IV tx here  pm IV lab 20  - HELLP labs negative over multiple sets, P:C 1.43  - For twice weekly HELLP labs (Mon/Th) while inpatient if BP & symptoms stable  - s/p 12hr Mag gtt on admission  - Current BP Regimen: nifed 60 BID + Lab 600 BID   - Continued recommendation for inpatient monitoring and recommendation for delivery at 34.0 wga at the latest, however would deliver before 34wks for indications including but not limited to rapid uptitration of antihypertensives and concern for fetal wellbeing in the setting of now known FGR.      FGR, rhizomelic shortening of long bones  - EFW (): 526g (4%ile), normal Dopplers  - Daily BPP and dopplers  - Genetics consulted: Discussed options for possible amniocentesis, cord blood karyotyping  vs fetal karyotyping with CMA, genetic panel or RONEL  - one loop of rEDF on , dopplers since iAEDF, dopplers pending this AM     Fetal status  - AGA NST this AM  - BID NSTs  - Dopplers as above  - BMZ -, s/p rBMZ -  - s/p NICU cs , offered patient re-consult patient declined at this time     Routine:  - GBS  negative 1/27  - s/p TDAP 2/9  - Flu 10/23  - 1hr: not yet completed, POCT BG checks wnl in the s/o BMZ, will offer to complete today  - BCM: declines     Dispo: Stable, for daily BPP/dopplers and BID NST    To be d/w Dr. Josie Byrne MD   OB/GYN PGY2  MFM [id number]

## 2024-02-13 NOTE — CARE PLAN
The patient's goals for the shift include remain pregnant    The clinical goals for the shift include maintain BP <160/110    Patient remained free from falls/injury throughout shift. VSS on scheduled BP medications. FHR remained reassuring throughout shift, pt denies OB complaints or s/sx of worsening HDP at this time. Resting comfortably in bed, denies needs at this time.

## 2024-02-14 ENCOUNTER — APPOINTMENT (OUTPATIENT)
Dept: RADIOLOGY | Facility: HOSPITAL | Age: 34
End: 2024-02-14
Payer: COMMERCIAL

## 2024-02-14 PROCEDURE — 76999 ECHO EXAMINATION PROCEDURE: CPT

## 2024-02-14 PROCEDURE — 76819 FETAL BIOPHYS PROFIL W/O NST: CPT

## 2024-02-14 PROCEDURE — 76820 UMBILICAL ARTERY ECHO: CPT

## 2024-02-14 PROCEDURE — 99232 SBSQ HOSP IP/OBS MODERATE 35: CPT | Performed by: STUDENT IN AN ORGANIZED HEALTH CARE EDUCATION/TRAINING PROGRAM

## 2024-02-14 PROCEDURE — 2500000001 HC RX 250 WO HCPCS SELF ADMINISTERED DRUGS (ALT 637 FOR MEDICARE OP): Performed by: STUDENT IN AN ORGANIZED HEALTH CARE EDUCATION/TRAINING PROGRAM

## 2024-02-14 PROCEDURE — 59025 FETAL NON-STRESS TEST: CPT | Mod: GC | Performed by: STUDENT IN AN ORGANIZED HEALTH CARE EDUCATION/TRAINING PROGRAM

## 2024-02-14 PROCEDURE — 2500000002 HC RX 250 W HCPCS SELF ADMINISTERED DRUGS (ALT 637 FOR MEDICARE OP, ALT 636 FOR OP/ED): Performed by: STUDENT IN AN ORGANIZED HEALTH CARE EDUCATION/TRAINING PROGRAM

## 2024-02-14 PROCEDURE — 1210000001 HC SEMI-PRIVATE ROOM DAILY

## 2024-02-14 PROCEDURE — 59025 FETAL NON-STRESS TEST: CPT | Performed by: STUDENT IN AN ORGANIZED HEALTH CARE EDUCATION/TRAINING PROGRAM

## 2024-02-14 RX ADMIN — NIFEDIPINE 60 MG: 60 TABLET, EXTENDED RELEASE ORAL at 08:44

## 2024-02-14 RX ADMIN — NIFEDIPINE 60 MG: 60 TABLET, EXTENDED RELEASE ORAL at 20:58

## 2024-02-14 RX ADMIN — LABETALOL HYDROCHLORIDE 600 MG: 200 TABLET, FILM COATED ORAL at 06:26

## 2024-02-14 RX ADMIN — LABETALOL HYDROCHLORIDE 600 MG: 200 TABLET, FILM COATED ORAL at 17:42

## 2024-02-14 ASSESSMENT — PAIN SCALES - GENERAL
PAINLEVEL_OUTOF10: 0 - NO PAIN

## 2024-02-14 ASSESSMENT — PAIN - FUNCTIONAL ASSESSMENT
PAIN_FUNCTIONAL_ASSESSMENT: 0-10

## 2024-02-14 NOTE — PROGRESS NOTES
ANTEPARTUM PROGRESS NOTE   24, 7:05 AM    SUBJECTIVE:  No acute events overnight. Denies HA, visual changes, CP, SOB, RUQ pain, or epigastric pain.  Good fetal movement, no contractions, vaginal bleeding, or loss of fluid        OBJECTIVE:   Vitals:    24 0509   BP: 126/81   Pulse: 80   Resp: 17   Temp: 36.7 °C (98.1 °F)   SpO2: 97%     Gen: NAD  HEENT: NCAT  Resp: normal work of breathing on room air  Card: regular rate  Neuro: grossly intact   Psych: approipriate  Motor: moving all extremities spontaneously   Abdomen: gravid, nontender to palpation    NST   / mod anjana/- accels/- decels   [place]: quiet      ASSESSMENT AND PLAN: 34yo  at 26.3w by IVF dating admitted with preE with SF & FGR     sPEC   - Diagnosed by severe range BP requiring IV tx, has also had severe ranges >4 hrs apart  - S/p IV lab 20/40 at [facility] ; last IV tx here  pm IV lab 20  - HELLP labs negative over multiple sets, P:C 1.43  - For twice weekly HELLP labs (Mon/Thurs) while inpatient if BP & symptoms stable  - s/p 12hr Mag gtt on admission  - Current BP Regimen: nifed 60 BID + Lab 600 BID   - Continued recommendation for inpatient monitoring and recommendation for delivery at 34.0 wga at the latest, however would deliver before 34wks for indications including but not limited to rapid uptitration of antihypertensives and concern for fetal wellbeing in the setting of now known FGR.      FGR, rhizomelic shortening of long bones  - EFW (): 526g (4%ile), normal Dopplers  - Daily BPP and dopplers  - Genetics consulted: Discussed options for possible amniocentesis, cord blood karyotyping  vs fetal karyotyping with CMA, genetic panel or RONEL  - one loop of rEDF on , dopplers since iAEDF, dopplers pending this AM     Fetal status  - AGA NST this AM  - BID NSTs  - Dopplers as above  - BMZ -, s/p rBMZ -  - s/p NICU cs , offered patient re-consult patient declined at this time     Routine:  - GBS negative  1/27  - s/p TDAP 2/9  - Flu 10/23  - 1hr: not yet completed, POCT BG checks wnl in the s/o BMZ, will offer to complete today  - BCM: declines     Dispo: Stable, for daily BPP/dopplers and BID NST    To be d/w Dr. Josie Domingo MD   OB/GYN PGY2  MFM [id number]

## 2024-02-15 ENCOUNTER — APPOINTMENT (OUTPATIENT)
Dept: RADIOLOGY | Facility: HOSPITAL | Age: 34
End: 2024-02-15
Payer: COMMERCIAL

## 2024-02-15 LAB
ABO GROUP (TYPE) IN BLOOD: NORMAL
ALBUMIN SERPL BCP-MCNC: 3.2 G/DL (ref 3.4–5)
ALP SERPL-CCNC: 52 U/L (ref 33–110)
ALT SERPL W P-5'-P-CCNC: 37 U/L (ref 7–45)
ANION GAP SERPL CALC-SCNC: 14 MMOL/L (ref 10–20)
ANTIBODY SCREEN: NORMAL
AST SERPL W P-5'-P-CCNC: 27 U/L (ref 9–39)
BILIRUB SERPL-MCNC: 0.3 MG/DL (ref 0–1.2)
BUN SERPL-MCNC: 18 MG/DL (ref 6–23)
CALCIUM SERPL-MCNC: 8.7 MG/DL (ref 8.6–10.6)
CHLORIDE SERPL-SCNC: 103 MMOL/L (ref 98–107)
CO2 SERPL-SCNC: 23 MMOL/L (ref 21–32)
CREAT SERPL-MCNC: 0.63 MG/DL (ref 0.5–1.05)
EGFRCR SERPLBLD CKD-EPI 2021: >90 ML/MIN/1.73M*2
GLUCOSE 1H P 50 G GLC PO SERPL-MCNC: 82 MG/DL
GLUCOSE SERPL-MCNC: 82 MG/DL (ref 74–99)
HOLD SPECIMEN: NORMAL
POTASSIUM SERPL-SCNC: 3.6 MMOL/L (ref 3.5–5.3)
PROT SERPL-MCNC: 5.8 G/DL (ref 6.4–8.2)
RH FACTOR (ANTIGEN D): NORMAL
SODIUM SERPL-SCNC: 136 MMOL/L (ref 136–145)

## 2024-02-15 PROCEDURE — 76999 ECHO EXAMINATION PROCEDURE: CPT

## 2024-02-15 PROCEDURE — 86901 BLOOD TYPING SEROLOGIC RH(D): CPT

## 2024-02-15 PROCEDURE — 86920 COMPATIBILITY TEST SPIN: CPT

## 2024-02-15 PROCEDURE — 82947 ASSAY GLUCOSE BLOOD QUANT: CPT

## 2024-02-15 PROCEDURE — 76820 UMBILICAL ARTERY ECHO: CPT

## 2024-02-15 PROCEDURE — 76819 FETAL BIOPHYS PROFIL W/O NST: CPT

## 2024-02-15 PROCEDURE — 99232 SBSQ HOSP IP/OBS MODERATE 35: CPT | Performed by: STUDENT IN AN ORGANIZED HEALTH CARE EDUCATION/TRAINING PROGRAM

## 2024-02-15 PROCEDURE — 36415 COLL VENOUS BLD VENIPUNCTURE: CPT

## 2024-02-15 PROCEDURE — 59025 FETAL NON-STRESS TEST: CPT | Performed by: STUDENT IN AN ORGANIZED HEALTH CARE EDUCATION/TRAINING PROGRAM

## 2024-02-15 PROCEDURE — 2500000001 HC RX 250 WO HCPCS SELF ADMINISTERED DRUGS (ALT 637 FOR MEDICARE OP): Performed by: STUDENT IN AN ORGANIZED HEALTH CARE EDUCATION/TRAINING PROGRAM

## 2024-02-15 PROCEDURE — 59025 FETAL NON-STRESS TEST: CPT | Mod: GC | Performed by: STUDENT IN AN ORGANIZED HEALTH CARE EDUCATION/TRAINING PROGRAM

## 2024-02-15 PROCEDURE — 1210000001 HC SEMI-PRIVATE ROOM DAILY

## 2024-02-15 PROCEDURE — 84075 ASSAY ALKALINE PHOSPHATASE: CPT

## 2024-02-15 PROCEDURE — 36415 COLL VENOUS BLD VENIPUNCTURE: CPT | Performed by: OBSTETRICS & GYNECOLOGY

## 2024-02-15 PROCEDURE — 2500000002 HC RX 250 W HCPCS SELF ADMINISTERED DRUGS (ALT 637 FOR MEDICARE OP, ALT 636 FOR OP/ED): Performed by: STUDENT IN AN ORGANIZED HEALTH CARE EDUCATION/TRAINING PROGRAM

## 2024-02-15 RX ADMIN — LABETALOL HYDROCHLORIDE 600 MG: 200 TABLET, FILM COATED ORAL at 18:04

## 2024-02-15 RX ADMIN — NIFEDIPINE 60 MG: 60 TABLET, EXTENDED RELEASE ORAL at 08:31

## 2024-02-15 RX ADMIN — NIFEDIPINE 60 MG: 60 TABLET, EXTENDED RELEASE ORAL at 20:21

## 2024-02-15 RX ADMIN — LABETALOL HYDROCHLORIDE 600 MG: 200 TABLET, FILM COATED ORAL at 06:22

## 2024-02-15 ASSESSMENT — PAIN SCALES - GENERAL
PAINLEVEL_OUTOF10: 0 - NO PAIN

## 2024-02-15 ASSESSMENT — PAIN - FUNCTIONAL ASSESSMENT
PAIN_FUNCTIONAL_ASSESSMENT: 0-10

## 2024-02-15 NOTE — PROGRESS NOTES
ANTEPARTUM PROGRESS NOTE   02/15/24, 7:50 AM    SUBJECTIVE:  No acute events overnight. Denies HA, visual changes, CP, SOB, RUQ pain, or epigastric pain.  Good fetal movement, no contractions, vaginal bleeding, or loss of fluid        OBJECTIVE:   Vitals:    02/15/24 0500   BP: 129/79   Pulse: 82   Resp: 16   Temp: 36.9 °C (98.4 °F)   SpO2: 97%     Gen: NAD  HEENT: NCAT  Resp: normal work of breathing on room air  Card: regular rate  Neuro: grossly intact   Psych: approipriate  Motor: moving all extremities spontaneously   Abdomen: gravid, nontender to palpation    NST   / mod anjana/- accels/- decels   [place]: quiet      ASSESSMENT AND PLAN: 32yo  at 26.4w by IVF dating admitted with preE with SF & FGR     sPEC   - Diagnosed by severe range BP requiring IV tx, has also had severe ranges >4 hrs apart  - S/p IV lab 20/40 at [facility] ; last IV tx here  pm IV lab 20  - HELLP labs negative over multiple sets, P:C 1.43  - For twice weekly HELLP labs (Mon/Th) while inpatient if BP & symptoms stable  - s/p 12hr Mag gtt on admission  - Current BP Regimen: nifed 60 BID + Lab 600 BID   - Continued recommendation for inpatient monitoring and recommendation for delivery at 34.0 wga at the latest, however would deliver before 34wks for indications including but not limited to rapid uptitration of antihypertensives and concern for fetal wellbeing in the setting of now known FGR.      FGR, abnormal dopplers   - EFW (): 526g (4%ile), normal Dopplers  - Daily BPP and dopplers  - Genetics consulted: Discussed options for possible amniocentesis, cord blood karyotyping  vs fetal karyotyping with CMA, genetic panel or RONEL  - one loop of rEDF on , dopplers since iAEDF, dopplers daily, pending this AM     Fetal status  - AGA NST this AM  - BID NSTs  - Dopplers as above  - BMZ -, s/p rBMZ -  - s/p NICU cs , offered patient re-consult patient declined at this time     Routine:  - GBS negative   -  s/p TDAP 2/9  - Flu 10/23  - 1hr: in progress 2/15   - BCM: declines     Dispo: Stable, for daily BPP/dopplers and BID NST    To be d/w Dr. Josie Domingo MD   OB/GYN PGY2  MFM [id number]

## 2024-02-15 NOTE — CARE PLAN
The patient's goals for the shift include rest    The clinical goals for the shift include maintain BPs <160/110    Patient remains stable this shift. Patient BP controlled with PO antihypertensives. FHR remains stable. RN will continue to monitor.

## 2024-02-16 ENCOUNTER — APPOINTMENT (OUTPATIENT)
Dept: RADIOLOGY | Facility: HOSPITAL | Age: 34
End: 2024-02-16
Payer: COMMERCIAL

## 2024-02-16 ENCOUNTER — ANESTHESIA (OUTPATIENT)
Dept: OBSTETRICS AND GYNECOLOGY | Facility: HOSPITAL | Age: 34
End: 2024-02-16
Payer: COMMERCIAL

## 2024-02-16 ENCOUNTER — ANESTHESIA EVENT (OUTPATIENT)
Dept: OBSTETRICS AND GYNECOLOGY | Facility: HOSPITAL | Age: 34
End: 2024-02-16
Payer: COMMERCIAL

## 2024-02-16 LAB
APTT PPP: 24 SECONDS (ref 27–38)
ERYTHROCYTE [DISTWIDTH] IN BLOOD BY AUTOMATED COUNT: 13.1 % (ref 11.5–14.5)
FIBRINOGEN PPP-MCNC: 268 MG/DL (ref 200–400)
HCT VFR BLD AUTO: 34.2 % (ref 36–46)
HGB BLD-MCNC: 11.9 G/DL (ref 12–16)
INR PPP: 0.8 (ref 0.9–1.1)
MCH RBC QN AUTO: 31.1 PG (ref 26–34)
MCHC RBC AUTO-ENTMCNC: 34.8 G/DL (ref 32–36)
MCV RBC AUTO: 89 FL (ref 80–100)
NRBC BLD-RTO: 0 /100 WBCS (ref 0–0)
PLATELET # BLD AUTO: 258 X10*3/UL (ref 150–450)
PROTHROMBIN TIME: 9.4 SECONDS (ref 9.8–12.8)
RBC # BLD AUTO: 3.83 X10*6/UL (ref 4–5.2)
WBC # BLD AUTO: 18.6 X10*3/UL (ref 4.4–11.3)

## 2024-02-16 PROCEDURE — 2500000001 HC RX 250 WO HCPCS SELF ADMINISTERED DRUGS (ALT 637 FOR MEDICARE OP): Performed by: STUDENT IN AN ORGANIZED HEALTH CARE EDUCATION/TRAINING PROGRAM

## 2024-02-16 PROCEDURE — 3700000014 HC AN EPIDURAL BLOCK CHARGE: Performed by: STUDENT IN AN ORGANIZED HEALTH CARE EDUCATION/TRAINING PROGRAM

## 2024-02-16 PROCEDURE — 2500000004 HC RX 250 GENERAL PHARMACY W/ HCPCS (ALT 636 FOR OP/ED): Performed by: STUDENT IN AN ORGANIZED HEALTH CARE EDUCATION/TRAINING PROGRAM

## 2024-02-16 PROCEDURE — 85384 FIBRINOGEN ACTIVITY: CPT | Performed by: STUDENT IN AN ORGANIZED HEALTH CARE EDUCATION/TRAINING PROGRAM

## 2024-02-16 PROCEDURE — 1100000001 HC PRIVATE ROOM DAILY

## 2024-02-16 PROCEDURE — 76999 ECHO EXAMINATION PROCEDURE: CPT

## 2024-02-16 PROCEDURE — 2500000002 HC RX 250 W HCPCS SELF ADMINISTERED DRUGS (ALT 637 FOR MEDICARE OP, ALT 636 FOR OP/ED): Performed by: STUDENT IN AN ORGANIZED HEALTH CARE EDUCATION/TRAINING PROGRAM

## 2024-02-16 PROCEDURE — 99232 SBSQ HOSP IP/OBS MODERATE 35: CPT | Performed by: STUDENT IN AN ORGANIZED HEALTH CARE EDUCATION/TRAINING PROGRAM

## 2024-02-16 PROCEDURE — 76818 FETAL BIOPHYS PROFILE W/NST: CPT | Performed by: STUDENT IN AN ORGANIZED HEALTH CARE EDUCATION/TRAINING PROGRAM

## 2024-02-16 PROCEDURE — 7100000016 HC LABOR RECOVERY PER HOUR: Performed by: STUDENT IN AN ORGANIZED HEALTH CARE EDUCATION/TRAINING PROGRAM

## 2024-02-16 PROCEDURE — 7210000002 HC LABOR PER HOUR

## 2024-02-16 PROCEDURE — 2500000005 HC RX 250 GENERAL PHARMACY W/O HCPCS: Performed by: STUDENT IN AN ORGANIZED HEALTH CARE EDUCATION/TRAINING PROGRAM

## 2024-02-16 PROCEDURE — 99199 UNLISTED SPECIAL SVC PX/RPRT: CPT

## 2024-02-16 PROCEDURE — 59514 CESAREAN DELIVERY ONLY: CPT | Performed by: STUDENT IN AN ORGANIZED HEALTH CARE EDUCATION/TRAINING PROGRAM

## 2024-02-16 PROCEDURE — 76818 FETAL BIOPHYS PROFILE W/NST: CPT

## 2024-02-16 PROCEDURE — 3700000018 HC OB ANESTHESIA C-SECTION: Performed by: STUDENT IN AN ORGANIZED HEALTH CARE EDUCATION/TRAINING PROGRAM

## 2024-02-16 PROCEDURE — 01961 ANES CESAREAN DELIVERY ONLY: CPT | Performed by: ANESTHESIOLOGY

## 2024-02-16 PROCEDURE — 59050 FETAL MONITOR W/REPORT: CPT

## 2024-02-16 PROCEDURE — 2500000004 HC RX 250 GENERAL PHARMACY W/ HCPCS (ALT 636 FOR OP/ED)

## 2024-02-16 PROCEDURE — 85730 THROMBOPLASTIN TIME PARTIAL: CPT | Performed by: STUDENT IN AN ORGANIZED HEALTH CARE EDUCATION/TRAINING PROGRAM

## 2024-02-16 PROCEDURE — 2780000003 HC OR 278 NO HCPCS

## 2024-02-16 PROCEDURE — 85027 COMPLETE CBC AUTOMATED: CPT | Performed by: STUDENT IN AN ORGANIZED HEALTH CARE EDUCATION/TRAINING PROGRAM

## 2024-02-16 PROCEDURE — 59515 CESAREAN DELIVERY: CPT

## 2024-02-16 PROCEDURE — 76820 UMBILICAL ARTERY ECHO: CPT

## 2024-02-16 PROCEDURE — 1210000001 HC SEMI-PRIVATE ROOM DAILY

## 2024-02-16 PROCEDURE — 2720000007 HC OR 272 NO HCPCS

## 2024-02-16 PROCEDURE — C1765 ADHESION BARRIER: HCPCS

## 2024-02-16 RX ORDER — KETOROLAC TROMETHAMINE 30 MG/ML
INJECTION, SOLUTION INTRAMUSCULAR; INTRAVENOUS AS NEEDED
Status: DISCONTINUED | OUTPATIENT
Start: 2024-02-16 | End: 2024-02-17

## 2024-02-16 RX ORDER — KETOROLAC TROMETHAMINE 30 MG/ML
INJECTION, SOLUTION INTRAMUSCULAR; INTRAVENOUS AS NEEDED
Status: DISCONTINUED | OUTPATIENT
Start: 2024-02-16 | End: 2024-02-16

## 2024-02-16 RX ORDER — CEFAZOLIN 1 G/1
INJECTION, POWDER, FOR SOLUTION INTRAVENOUS AS NEEDED
Status: DISCONTINUED | OUTPATIENT
Start: 2024-02-16 | End: 2024-02-17

## 2024-02-16 RX ORDER — CALCIUM GLUCONATE 98 MG/ML
1 INJECTION, SOLUTION INTRAVENOUS ONCE AS NEEDED
Status: DISCONTINUED | OUTPATIENT
Start: 2024-02-16 | End: 2024-02-19

## 2024-02-16 RX ORDER — MORPHINE SULFATE 0.5 MG/ML
INJECTION, SOLUTION EPIDURAL; INTRATHECAL; INTRAVENOUS AS NEEDED
Status: DISCONTINUED | OUTPATIENT
Start: 2024-02-16 | End: 2024-02-16

## 2024-02-16 RX ORDER — FAMOTIDINE 10 MG/ML
INJECTION INTRAVENOUS AS NEEDED
Status: DISCONTINUED | OUTPATIENT
Start: 2024-02-16 | End: 2024-02-17

## 2024-02-16 RX ORDER — ACETAMINOPHEN 325 MG/1
975 TABLET ORAL ONCE
Status: COMPLETED | OUTPATIENT
Start: 2024-02-16 | End: 2024-02-16

## 2024-02-16 RX ORDER — BUPIVACAINE HYDROCHLORIDE 7.5 MG/ML
INJECTION, SOLUTION EPIDURAL; RETROBULBAR AS NEEDED
Status: DISCONTINUED | OUTPATIENT
Start: 2024-02-16 | End: 2024-02-17

## 2024-02-16 RX ORDER — MAGNESIUM SULFATE HEPTAHYDRATE 40 MG/ML
2 INJECTION, SOLUTION INTRAVENOUS CONTINUOUS
Status: DISCONTINUED | OUTPATIENT
Start: 2024-02-16 | End: 2024-02-19

## 2024-02-16 RX ORDER — SODIUM CITRATE AND CITRIC ACID MONOHYDRATE 334; 500 MG/5ML; MG/5ML
SOLUTION ORAL AS NEEDED
Status: DISCONTINUED | OUTPATIENT
Start: 2024-02-16 | End: 2024-02-17

## 2024-02-16 RX ORDER — FENTANYL CITRATE 50 UG/ML
INJECTION, SOLUTION INTRAMUSCULAR; INTRAVENOUS AS NEEDED
Status: DISCONTINUED | OUTPATIENT
Start: 2024-02-16 | End: 2024-02-17

## 2024-02-16 RX ORDER — LIDOCAINE HYDROCHLORIDE 20 MG/ML
INJECTION, SOLUTION EPIDURAL; INFILTRATION; INTRACAUDAL; PERINEURAL AS NEEDED
Status: DISCONTINUED | OUTPATIENT
Start: 2024-02-16 | End: 2024-02-17

## 2024-02-16 RX ORDER — ACETAMINOPHEN 120 MG/1
SUPPOSITORY RECTAL AS NEEDED
Status: DISCONTINUED | OUTPATIENT
Start: 2024-02-16 | End: 2024-02-17

## 2024-02-16 RX ORDER — DEXMEDETOMIDINE IN 0.9 % NACL 20 MCG/5ML
SYRINGE (ML) INTRAVENOUS AS NEEDED
Status: DISCONTINUED | OUTPATIENT
Start: 2024-02-16 | End: 2024-02-17

## 2024-02-16 RX ORDER — MORPHINE SULFATE 0.5 MG/ML
INJECTION, SOLUTION EPIDURAL; INTRATHECAL; INTRAVENOUS AS NEEDED
Status: DISCONTINUED | OUTPATIENT
Start: 2024-02-16 | End: 2024-02-17

## 2024-02-16 RX ADMIN — LIDOCAINE HYDROCHLORIDE 100 MG: 20 INJECTION, SOLUTION EPIDURAL; INFILTRATION; INTRACAUDAL; PERINEURAL at 21:57

## 2024-02-16 RX ADMIN — CARBOPROST TROMETHAMINE 250 MCG: 250 INJECTION, SOLUTION INTRAMUSCULAR at 22:25

## 2024-02-16 RX ADMIN — LABETALOL HYDROCHLORIDE 600 MG: 200 TABLET, FILM COATED ORAL at 06:42

## 2024-02-16 RX ADMIN — BUPIVACAINE HYDROCHLORIDE 12 MG: 7.5 INJECTION, SOLUTION EPIDURAL; RETROBULBAR at 21:37

## 2024-02-16 RX ADMIN — CARBOPROST TROMETHAMINE 250 MCG: 250 INJECTION, SOLUTION INTRAMUSCULAR at 22:47

## 2024-02-16 RX ADMIN — FAMOTIDINE 20 MG: 10 INJECTION INTRAVENOUS at 21:07

## 2024-02-16 RX ADMIN — SODIUM CHLORIDE, SODIUM LACTATE, POTASSIUM CHLORIDE, AND CALCIUM CHLORIDE: 600; 310; 30; 20 INJECTION, SOLUTION INTRAVENOUS at 21:39

## 2024-02-16 RX ADMIN — SODIUM CHLORIDE, POTASSIUM CHLORIDE, SODIUM LACTATE AND CALCIUM CHLORIDE 125 ML/HR: 600; 310; 30; 20 INJECTION, SOLUTION INTRAVENOUS at 12:52

## 2024-02-16 RX ADMIN — ACETAMINOPHEN 650 MG: 120 SUPPOSITORY RECTAL at 23:42

## 2024-02-16 RX ADMIN — CARBOPROST TROMETHAMINE 250 MCG: 250 INJECTION, SOLUTION INTRAMUSCULAR at 23:16

## 2024-02-16 RX ADMIN — ACETAMINOPHEN 975 MG: 325 TABLET ORAL at 14:55

## 2024-02-16 RX ADMIN — NIFEDIPINE 60 MG: 60 TABLET, EXTENDED RELEASE ORAL at 20:59

## 2024-02-16 RX ADMIN — CEFAZOLIN 2 G: 330 INJECTION, POWDER, FOR SOLUTION INTRAMUSCULAR; INTRAVENOUS at 21:46

## 2024-02-16 RX ADMIN — MAGNESIUM SULFATE HEPTAHYDRATE 2 G/HR: 40 INJECTION, SOLUTION INTRAVENOUS at 19:18

## 2024-02-16 RX ADMIN — NIFEDIPINE 60 MG: 60 TABLET, EXTENDED RELEASE ORAL at 08:31

## 2024-02-16 RX ADMIN — MISOPROSTOL 200 MCG: 200 TABLET ORAL at 22:30

## 2024-02-16 RX ADMIN — Medication 600 MILLI-UNITS/MIN: at 22:05

## 2024-02-16 RX ADMIN — ONDANSETRON 4 MG: 2 INJECTION INTRAMUSCULAR; INTRAVENOUS at 21:08

## 2024-02-16 RX ADMIN — KETOROLAC TROMETHAMINE 30 MG: 30 INJECTION, SOLUTION INTRAMUSCULAR; INTRAVENOUS at 23:21

## 2024-02-16 RX ADMIN — MORPHINE SULFATE 3 MG: 0.5 INJECTION EPIDURAL; INTRATHECAL; INTRAVENOUS at 23:04

## 2024-02-16 RX ADMIN — MORPHINE SULFATE 2 MG: 0.5 INJECTION EPIDURAL; INTRATHECAL; INTRAVENOUS at 23:05

## 2024-02-16 RX ADMIN — METOCLOPRAMIDE 10 MG: 5 INJECTION, SOLUTION INTRAMUSCULAR; INTRAVENOUS at 12:46

## 2024-02-16 RX ADMIN — Medication 20 MCG: at 22:02

## 2024-02-16 RX ADMIN — SODIUM CITRATE AND CITRIC ACID MONOHYDRATE 30 ML: 500; 334 SOLUTION ORAL at 21:08

## 2024-02-16 RX ADMIN — FENTANYL CITRATE 100 MCG: 50 INJECTION, SOLUTION INTRAMUSCULAR; INTRAVENOUS at 23:04

## 2024-02-16 RX ADMIN — MAGNESIUM SULFATE HEPTAHYDRATE 2 G/HR: 40 INJECTION, SOLUTION INTRAVENOUS at 13:14

## 2024-02-16 RX ADMIN — TRANEXAMIC ACID 1000 MG: 100 INJECTION, SOLUTION INTRAVENOUS at 22:52

## 2024-02-16 RX ADMIN — LABETALOL HYDROCHLORIDE 600 MG: 200 TABLET, FILM COATED ORAL at 18:31

## 2024-02-16 SDOH — HEALTH STABILITY: MENTAL HEALTH: CURRENT SMOKER: 0

## 2024-02-16 SDOH — SOCIAL STABILITY: SOCIAL INSECURITY
WITHIN THE LAST YEAR, HAVE YOU BEEN KICKED, HIT, SLAPPED, OR OTHERWISE PHYSICALLY HURT BY YOUR PARTNER OR EX-PARTNER?: NO

## 2024-02-16 SDOH — SOCIAL STABILITY: SOCIAL INSECURITY
WITHIN THE LAST YEAR, HAVE TO BEEN RAPED OR FORCED TO HAVE ANY KIND OF SEXUAL ACTIVITY BY YOUR PARTNER OR EX-PARTNER?: NO

## 2024-02-16 SDOH — SOCIAL STABILITY: SOCIAL INSECURITY: WITHIN THE LAST YEAR, HAVE YOU BEEN AFRAID OF YOUR PARTNER OR EX-PARTNER?: NO

## 2024-02-16 SDOH — SOCIAL STABILITY: SOCIAL INSECURITY: WITHIN THE LAST YEAR, HAVE YOU BEEN HUMILIATED OR EMOTIONALLY ABUSED IN OTHER WAYS BY YOUR PARTNER OR EX-PARTNER?: NO

## 2024-02-16 SDOH — ECONOMIC STABILITY: FOOD INSECURITY: WITHIN THE PAST 12 MONTHS, YOU WORRIED THAT YOUR FOOD WOULD RUN OUT BEFORE YOU GOT MONEY TO BUY MORE.: NEVER TRUE

## 2024-02-16 SDOH — ECONOMIC STABILITY: FOOD INSECURITY: WITHIN THE PAST 12 MONTHS, THE FOOD YOU BOUGHT JUST DIDN'T LAST AND YOU DIDN'T HAVE MONEY TO GET MORE.: NEVER TRUE

## 2024-02-16 ASSESSMENT — PAIN SCALES - GENERAL
PAINLEVEL_OUTOF10: 0 - NO PAIN
PAINLEVEL_OUTOF10: 2
PAINLEVEL_OUTOF10: 1
PAINLEVEL_OUTOF10: 0 - NO PAIN

## 2024-02-16 ASSESSMENT — PAIN DESCRIPTION - DESCRIPTORS
DESCRIPTORS: ACHING
DESCRIPTORS: ACHING

## 2024-02-16 ASSESSMENT — PAIN - FUNCTIONAL ASSESSMENT
PAIN_FUNCTIONAL_ASSESSMENT: 0-10

## 2024-02-16 NOTE — RESEARCH NOTES
The  research team approached Emelyn Perez about  consent for her infant to participate in the Budesonide for Babies (BiB) study postnatally. She CONSENTED to have her infant participate. A copy of the consent form will be kept on file in the NICU through the eligibility window.

## 2024-02-17 LAB
ALBUMIN SERPL BCP-MCNC: 2.9 G/DL (ref 3.4–5)
ALP SERPL-CCNC: 67 U/L (ref 33–110)
ALT SERPL W P-5'-P-CCNC: 31 U/L (ref 7–45)
ANION GAP SERPL CALC-SCNC: 16 MMOL/L (ref 10–20)
APTT PPP: 25 SECONDS (ref 27–38)
APTT PPP: 26 SECONDS (ref 27–38)
AST SERPL W P-5'-P-CCNC: 31 U/L (ref 9–39)
BILIRUB SERPL-MCNC: 0.3 MG/DL (ref 0–1.2)
BUN SERPL-MCNC: 25 MG/DL (ref 6–23)
CALCIUM SERPL-MCNC: 7 MG/DL (ref 8.6–10.6)
CHLORIDE SERPL-SCNC: 98 MMOL/L (ref 98–107)
CO2 SERPL-SCNC: 19 MMOL/L (ref 21–32)
CREAT SERPL-MCNC: 0.74 MG/DL (ref 0.5–1.05)
EGFRCR SERPLBLD CKD-EPI 2021: >90 ML/MIN/1.73M*2
ERYTHROCYTE [DISTWIDTH] IN BLOOD BY AUTOMATED COUNT: 13.1 % (ref 11.5–14.5)
ERYTHROCYTE [DISTWIDTH] IN BLOOD BY AUTOMATED COUNT: 13.3 % (ref 11.5–14.5)
FIBRINOGEN PPP-MCNC: 266 MG/DL (ref 200–400)
GLUCOSE SERPL-MCNC: 100 MG/DL (ref 74–99)
HCT VFR BLD AUTO: 32.5 % (ref 36–46)
HCT VFR BLD AUTO: 33.5 % (ref 36–46)
HGB BLD-MCNC: 10.9 G/DL (ref 12–16)
HGB BLD-MCNC: 11 G/DL (ref 12–16)
HOLD SPECIMEN: NORMAL
INR PPP: 0.8 (ref 0.9–1.1)
INR PPP: 0.9 (ref 0.9–1.1)
MCH RBC QN AUTO: 29.9 PG (ref 26–34)
MCH RBC QN AUTO: 30.5 PG (ref 26–34)
MCHC RBC AUTO-ENTMCNC: 32.8 G/DL (ref 32–36)
MCHC RBC AUTO-ENTMCNC: 33.5 G/DL (ref 32–36)
MCV RBC AUTO: 91 FL (ref 80–100)
MCV RBC AUTO: 91 FL (ref 80–100)
NRBC BLD-RTO: 0 /100 WBCS (ref 0–0)
NRBC BLD-RTO: 0 /100 WBCS (ref 0–0)
PLATELET # BLD AUTO: 263 X10*3/UL (ref 150–450)
PLATELET # BLD AUTO: 271 X10*3/UL (ref 150–450)
POTASSIUM SERPL-SCNC: 4.5 MMOL/L (ref 3.5–5.3)
PROT SERPL-MCNC: 5.2 G/DL (ref 6.4–8.2)
PROTHROMBIN TIME: 9.2 SECONDS (ref 9.8–12.8)
PROTHROMBIN TIME: 9.9 SECONDS (ref 9.8–12.8)
RBC # BLD AUTO: 3.57 X10*6/UL (ref 4–5.2)
RBC # BLD AUTO: 3.68 X10*6/UL (ref 4–5.2)
SODIUM SERPL-SCNC: 128 MMOL/L (ref 136–145)
WBC # BLD AUTO: 25.2 X10*3/UL (ref 4.4–11.3)
WBC # BLD AUTO: 30.8 X10*3/UL (ref 4.4–11.3)

## 2024-02-17 PROCEDURE — 85610 PROTHROMBIN TIME: CPT

## 2024-02-17 PROCEDURE — 2500000004 HC RX 250 GENERAL PHARMACY W/ HCPCS (ALT 636 FOR OP/ED)

## 2024-02-17 PROCEDURE — 99199 UNLISTED SPECIAL SVC PX/RPRT: CPT

## 2024-02-17 PROCEDURE — 85384 FIBRINOGEN ACTIVITY: CPT

## 2024-02-17 PROCEDURE — 88305 TISSUE EXAM BY PATHOLOGIST: CPT | Performed by: STUDENT IN AN ORGANIZED HEALTH CARE EDUCATION/TRAINING PROGRAM

## 2024-02-17 PROCEDURE — 2500000005 HC RX 250 GENERAL PHARMACY W/O HCPCS

## 2024-02-17 PROCEDURE — 2500000001 HC RX 250 WO HCPCS SELF ADMINISTERED DRUGS (ALT 637 FOR MEDICARE OP)

## 2024-02-17 PROCEDURE — C1765 ADHESION BARRIER: HCPCS

## 2024-02-17 PROCEDURE — 99232 SBSQ HOSP IP/OBS MODERATE 35: CPT

## 2024-02-17 PROCEDURE — 85027 COMPLETE CBC AUTOMATED: CPT

## 2024-02-17 PROCEDURE — 7100000016 HC LABOR RECOVERY PER HOUR

## 2024-02-17 PROCEDURE — 1210000001 HC SEMI-PRIVATE ROOM DAILY

## 2024-02-17 PROCEDURE — 80053 COMPREHEN METABOLIC PANEL: CPT

## 2024-02-17 PROCEDURE — 2780000003 HC OR 278 NO HCPCS

## 2024-02-17 PROCEDURE — 0W3R0ZZ CONTROL BLEEDING IN GENITOURINARY TRACT, OPEN APPROACH: ICD-10-PCS | Performed by: STUDENT IN AN ORGANIZED HEALTH CARE EDUCATION/TRAINING PROGRAM

## 2024-02-17 PROCEDURE — 88305 TISSUE EXAM BY PATHOLOGIST: CPT | Mod: TC,SUR | Performed by: STUDENT IN AN ORGANIZED HEALTH CARE EDUCATION/TRAINING PROGRAM

## 2024-02-17 RX ORDER — NIFEDIPINE 10 MG/1
10 CAPSULE ORAL ONCE AS NEEDED
Status: DISCONTINUED | OUTPATIENT
Start: 2024-02-17 | End: 2024-02-21 | Stop reason: HOSPADM

## 2024-02-17 RX ORDER — DIPHENHYDRAMINE HYDROCHLORIDE 50 MG/ML
25 INJECTION INTRAMUSCULAR; INTRAVENOUS EVERY 4 HOURS PRN
Status: DISCONTINUED | OUTPATIENT
Start: 2024-02-17 | End: 2024-02-21 | Stop reason: HOSPADM

## 2024-02-17 RX ORDER — TRANEXAMIC ACID 100 MG/ML
1000 INJECTION, SOLUTION INTRAVENOUS ONCE AS NEEDED
Status: DISCONTINUED | OUTPATIENT
Start: 2024-02-17 | End: 2024-02-21 | Stop reason: HOSPADM

## 2024-02-17 RX ORDER — DIPHENHYDRAMINE HCL 25 MG
25 CAPSULE ORAL EVERY 4 HOURS PRN
Status: DISCONTINUED | OUTPATIENT
Start: 2024-02-17 | End: 2024-02-21 | Stop reason: HOSPADM

## 2024-02-17 RX ORDER — NALOXONE HYDROCHLORIDE 0.4 MG/ML
0.1 INJECTION, SOLUTION INTRAMUSCULAR; INTRAVENOUS; SUBCUTANEOUS EVERY 5 MIN PRN
Status: DISCONTINUED | OUTPATIENT
Start: 2024-02-17 | End: 2024-02-21 | Stop reason: HOSPADM

## 2024-02-17 RX ORDER — LABETALOL HYDROCHLORIDE 5 MG/ML
20 INJECTION, SOLUTION INTRAVENOUS ONCE AS NEEDED
Status: DISCONTINUED | OUTPATIENT
Start: 2024-02-17 | End: 2024-02-21 | Stop reason: HOSPADM

## 2024-02-17 RX ORDER — MISOPROSTOL 200 UG/1
800 TABLET ORAL ONCE AS NEEDED
Status: DISCONTINUED | OUTPATIENT
Start: 2024-02-17 | End: 2024-02-21 | Stop reason: HOSPADM

## 2024-02-17 RX ORDER — ENOXAPARIN SODIUM 100 MG/ML
40 INJECTION SUBCUTANEOUS EVERY 24 HOURS
Status: DISCONTINUED | OUTPATIENT
Start: 2024-02-17 | End: 2024-02-21 | Stop reason: HOSPADM

## 2024-02-17 RX ORDER — SIMETHICONE 80 MG
80 TABLET,CHEWABLE ORAL 4 TIMES DAILY PRN
Status: DISCONTINUED | OUTPATIENT
Start: 2024-02-17 | End: 2024-02-21 | Stop reason: HOSPADM

## 2024-02-17 RX ORDER — HYDROMORPHONE HYDROCHLORIDE 1 MG/ML
0.2 INJECTION, SOLUTION INTRAMUSCULAR; INTRAVENOUS; SUBCUTANEOUS EVERY 5 MIN PRN
Status: DISCONTINUED | OUTPATIENT
Start: 2024-02-17 | End: 2024-02-17 | Stop reason: HOSPADM

## 2024-02-17 RX ORDER — OXYTOCIN 10 [USP'U]/ML
10 INJECTION, SOLUTION INTRAMUSCULAR; INTRAVENOUS ONCE AS NEEDED
Status: DISCONTINUED | OUTPATIENT
Start: 2024-02-17 | End: 2024-02-21 | Stop reason: HOSPADM

## 2024-02-17 RX ORDER — ONDANSETRON HYDROCHLORIDE 2 MG/ML
4 INJECTION, SOLUTION INTRAVENOUS EVERY 6 HOURS PRN
Status: DISCONTINUED | OUTPATIENT
Start: 2024-02-17 | End: 2024-02-21 | Stop reason: HOSPADM

## 2024-02-17 RX ORDER — OXYCODONE HYDROCHLORIDE 5 MG/1
10 TABLET ORAL EVERY 4 HOURS PRN
Status: DISCONTINUED | OUTPATIENT
Start: 2024-02-18 | End: 2024-02-21 | Stop reason: HOSPADM

## 2024-02-17 RX ORDER — KETOROLAC TROMETHAMINE 30 MG/ML
30 INJECTION, SOLUTION INTRAMUSCULAR; INTRAVENOUS EVERY 6 HOURS
Status: COMPLETED | OUTPATIENT
Start: 2024-02-17 | End: 2024-02-17

## 2024-02-17 RX ORDER — HYDROMORPHONE HYDROCHLORIDE 1 MG/ML
0.2 INJECTION, SOLUTION INTRAMUSCULAR; INTRAVENOUS; SUBCUTANEOUS EVERY 5 MIN PRN
Status: DISCONTINUED | OUTPATIENT
Start: 2024-02-17 | End: 2024-02-21 | Stop reason: HOSPADM

## 2024-02-17 RX ORDER — CARBOPROST TROMETHAMINE 250 UG/ML
250 INJECTION, SOLUTION INTRAMUSCULAR ONCE AS NEEDED
Status: DISCONTINUED | OUTPATIENT
Start: 2024-02-17 | End: 2024-02-21 | Stop reason: HOSPADM

## 2024-02-17 RX ORDER — METHYLERGONOVINE MALEATE 0.2 MG/ML
0.2 INJECTION INTRAVENOUS ONCE AS NEEDED
Status: DISCONTINUED | OUTPATIENT
Start: 2024-02-17 | End: 2024-02-21 | Stop reason: HOSPADM

## 2024-02-17 RX ORDER — HYDRALAZINE HYDROCHLORIDE 20 MG/ML
5 INJECTION INTRAMUSCULAR; INTRAVENOUS ONCE AS NEEDED
Status: DISCONTINUED | OUTPATIENT
Start: 2024-02-17 | End: 2024-02-21 | Stop reason: HOSPADM

## 2024-02-17 RX ORDER — IBUPROFEN 600 MG/1
600 TABLET ORAL EVERY 6 HOURS
Status: DISCONTINUED | OUTPATIENT
Start: 2024-02-17 | End: 2024-02-21 | Stop reason: HOSPADM

## 2024-02-17 RX ORDER — BISACODYL 10 MG/1
10 SUPPOSITORY RECTAL DAILY PRN
Status: DISCONTINUED | OUTPATIENT
Start: 2024-02-17 | End: 2024-02-21 | Stop reason: HOSPADM

## 2024-02-17 RX ORDER — ACETAMINOPHEN 325 MG/1
975 TABLET ORAL EVERY 6 HOURS
Status: DISCONTINUED | OUTPATIENT
Start: 2024-02-17 | End: 2024-02-21 | Stop reason: HOSPADM

## 2024-02-17 RX ORDER — LOPERAMIDE HYDROCHLORIDE 2 MG/1
4 CAPSULE ORAL EVERY 2 HOUR PRN
Status: DISCONTINUED | OUTPATIENT
Start: 2024-02-17 | End: 2024-02-21 | Stop reason: HOSPADM

## 2024-02-17 RX ORDER — ADHESIVE BANDAGE
10 BANDAGE TOPICAL
Status: DISCONTINUED | OUTPATIENT
Start: 2024-02-17 | End: 2024-02-21 | Stop reason: HOSPADM

## 2024-02-17 RX ORDER — LIDOCAINE 560 MG/1
1 PATCH PERCUTANEOUS; TOPICAL; TRANSDERMAL
Status: DISCONTINUED | OUTPATIENT
Start: 2024-02-17 | End: 2024-02-21 | Stop reason: HOSPADM

## 2024-02-17 RX ORDER — POLYETHYLENE GLYCOL 3350 17 G/17G
17 POWDER, FOR SOLUTION ORAL 2 TIMES DAILY PRN
Status: DISCONTINUED | OUTPATIENT
Start: 2024-02-17 | End: 2024-02-21 | Stop reason: HOSPADM

## 2024-02-17 RX ORDER — ONDANSETRON 4 MG/1
4 TABLET, FILM COATED ORAL EVERY 6 HOURS PRN
Status: DISCONTINUED | OUTPATIENT
Start: 2024-02-17 | End: 2024-02-21 | Stop reason: HOSPADM

## 2024-02-17 RX ORDER — BUTORPHANOL TARTRATE 1 MG/ML
1 INJECTION INTRAMUSCULAR; INTRAVENOUS
Status: DISCONTINUED | OUTPATIENT
Start: 2024-02-17 | End: 2024-02-21 | Stop reason: HOSPADM

## 2024-02-17 RX ORDER — OXYCODONE HYDROCHLORIDE 5 MG/1
5 TABLET ORAL EVERY 4 HOURS PRN
Status: DISCONTINUED | OUTPATIENT
Start: 2024-02-18 | End: 2024-02-21 | Stop reason: HOSPADM

## 2024-02-17 RX ORDER — OXYTOCIN/0.9 % SODIUM CHLORIDE 30/500 ML
60 PLASTIC BAG, INJECTION (ML) INTRAVENOUS ONCE AS NEEDED
Status: DISCONTINUED | OUTPATIENT
Start: 2024-02-17 | End: 2024-02-21 | Stop reason: HOSPADM

## 2024-02-17 RX ADMIN — ACETAMINOPHEN 975 MG: 325 TABLET ORAL at 06:05

## 2024-02-17 RX ADMIN — KETOROLAC TROMETHAMINE 30 MG: 30 INJECTION, SOLUTION INTRAMUSCULAR; INTRAVENOUS at 11:14

## 2024-02-17 RX ADMIN — ENOXAPARIN SODIUM 40 MG: 100 INJECTION SUBCUTANEOUS at 18:38

## 2024-02-17 RX ADMIN — MAGNESIUM SULFATE HEPTAHYDRATE 2 G/HR: 40 INJECTION, SOLUTION INTRAVENOUS at 00:13

## 2024-02-17 RX ADMIN — LIDOCAINE 1 PATCH: 4 PATCH TOPICAL at 22:56

## 2024-02-17 RX ADMIN — HYDROMORPHONE HYDROCHLORIDE 0.2 MG: 1 INJECTION, SOLUTION INTRAMUSCULAR; INTRAVENOUS; SUBCUTANEOUS at 02:59

## 2024-02-17 RX ADMIN — ACETAMINOPHEN 975 MG: 325 TABLET ORAL at 11:14

## 2024-02-17 RX ADMIN — KETOROLAC TROMETHAMINE 30 MG: 30 INJECTION, SOLUTION INTRAMUSCULAR; INTRAVENOUS at 06:05

## 2024-02-17 RX ADMIN — ACETAMINOPHEN 975 MG: 325 TABLET ORAL at 17:29

## 2024-02-17 RX ADMIN — KETOROLAC TROMETHAMINE 30 MG: 30 INJECTION, SOLUTION INTRAMUSCULAR; INTRAVENOUS at 17:29

## 2024-02-17 RX ADMIN — MAGNESIUM SULFATE HEPTAHYDRATE 2 G/HR: 40 INJECTION, SOLUTION INTRAVENOUS at 16:27

## 2024-02-17 RX ADMIN — LOPERAMIDE HYDROCHLORIDE 4 MG: 2 CAPSULE ORAL at 00:52

## 2024-02-17 RX ADMIN — MAGNESIUM SULFATE HEPTAHYDRATE 2 G/HR: 40 INJECTION, SOLUTION INTRAVENOUS at 06:23

## 2024-02-17 ASSESSMENT — PAIN SCALES - GENERAL
PAINLEVEL_OUTOF10: 6
PAINLEVEL_OUTOF10: 0 - NO PAIN
PAINLEVEL_OUTOF10: 3
PAINLEVEL_OUTOF10: 2
PAINLEVEL_OUTOF10: 0 - NO PAIN
PAINLEVEL_OUTOF10: 0 - NO PAIN
PAINLEVEL_OUTOF10: 4
PAINLEVEL_OUTOF10: 0 - NO PAIN

## 2024-02-17 ASSESSMENT — PAIN - FUNCTIONAL ASSESSMENT
PAIN_FUNCTIONAL_ASSESSMENT: 0-10
PAIN_FUNCTIONAL_ASSESSMENT: 0-10

## 2024-02-17 ASSESSMENT — PAIN DESCRIPTION - LOCATION: LOCATION: ABDOMEN

## 2024-02-17 ASSESSMENT — PAIN DESCRIPTION - DESCRIPTORS: DESCRIPTORS: ACHING;SORE

## 2024-02-17 NOTE — ANESTHESIA POSTPROCEDURE EVALUATION
Patient: Emelyn Perez    Procedure Summary       Date: 24 Room / Location: [facility] 2 OB    Anesthesia Start:  Anesthesia Stop: 24    Procedure: OBGYN Delivery  Section Diagnosis: (Other (Add Comments))    Surgeons: Corine Kent MD Responsible Provider: Shahnaz Agee MD    Anesthesia Type: CSE ASA Status: 2            Anesthesia Type: CSE    Vitals Value Taken Time   /72 24 2357   Temp 36.4 24 0006   Pulse 78 24 0001   Resp 14 24 0006   SpO2 99 % 24 0001       Anesthesia Post Evaluation    Patient location during evaluation: floor  Patient participation: complete - patient participated  Level of consciousness: awake and alert  Pain management: adequate  Airway patency: patent  Cardiovascular status: acceptable  Respiratory status: acceptable  Hydration status: acceptable  Postoperative Nausea and Vomiting: none        No notable events documented.

## 2024-02-17 NOTE — ANESTHESIA PROCEDURE NOTES
CSE Block    Patient location during procedure: OR  Start time: 2/16/2024 9:50 PM  End time: 2/16/2024 9:50 PM  Reason for block: procedure for pain and post-op pain management}  Staffing  Performed: resident   Authorized by: Shahnaz Agee MD    Performed by: Toñito Miles MD    Preanesthetic Checklist  Completed: patient identified, IV checked, risks and benefits discussed, surgical consent, monitors and equipment checked, pre-op evaluation, timeout performed and sterile techniques followed  Block Timeout  RN/Licensed healthcare professional reads aloud to the Anesthesia provider and entire team: Patient identity, procedure with side and site, patient position, and as applicable the availability of implants/special equipment/special requirements.    Timeout performed at:     CSE Block  Patient position: sitting  Prep: ChloraPrep  Sterility prep: mask and gloves  Sedation level: no sedation  Patient monitoring: blood pressure, continuous pulse oximetry and heart rate  Approach: midline  Local numbing: lidocaine 1% to skin and subcutaneous tissues  Vertebral space: lumbar  L3-4  Guidance: landmark technique    Epidural Needle  KEY technique: saline  Needle type: Tuohy   Needle gauge: 17 G  Needle length: 9 cm  Spinal Needle  Needle type: pencil-point   Needle gauge: 27 G  Free flow CSF: yes  Epidural Catheter  Catheter type: multi-orifice  Catheter at skin depth: 10 cm  Catheter securement method: clear occlusive dressing              Assessment  Sensory level: T4 bilateral  Number of attempts: 1  Procedure assessment: patient tolerated procedure well with no immediate complications

## 2024-02-17 NOTE — CARE PLAN
Problem: Antepartum  Goal: FHR remains reassuring  Outcome: Met  Goal: Minimize anxiety/maximize coping  Outcome: Met  Goal: Maintain pregnancy as long as maternal and/or fetal condition is stable  Outcome: Met  Goal: Avoid/minimize constipation  Outcome: Met  Goal: No decrease in circulation/VTE  Outcome: Met     Problem: Hypertensive Disorder of Pregnancy (HDP)  Goal: Minimal s/sx of HDP and BP<160/110  Outcome: Met     Problem: Pain - Adult  Goal: Verbalizes/displays adequate comfort level or baseline comfort level  Outcome: Met     Problem: Safety - Adult  Goal: Free from fall injury  Outcome: Met     Problem: Postpartum  Goal: Experiences normal postpartum course  Outcome: Progressing  Goal: Appropriate maternal -  bonding  Outcome: Progressing  Goal: Establish and maintain infant feeding pattern for adequate nutrition  Outcome: Progressing  Goal: No s/sx infection  Outcome: Met  Goal: No s/sx of hemorrhage  Outcome: Met  Goal: Minimal s/sx of HDP and BP<160/110  Outcome: Met     Problem:  Recovery Care  Goal: Verbalizes understanding of post-op instructions  Outcome: Met  Goal: Manages discomfort  Outcome: Progressing  Goal: Dressing intact until removed with any drainage marked  Outcome: Met  Goal: Patient vital signs are stable  Outcome: Met  Goal: Urine output is 0.5 mL/kg/hr or more  Outcome: Met  Goal: Fundus firm at midline  Outcome: Met   The patient's goals for the shift include to have a safe delivery/    The clinical goals for the shift include reassuring FHR, adequate labor course and C/S.

## 2024-02-17 NOTE — L&D DELIVERY NOTE
OB Delivery Note  2024  Emelyn Perez  33 y.o.   , Classical        Date: 2024 - 2024  OR Location: [facility] OB    Name: Emelyn Perez, : 1990, Age: 33 y.o., MRN: [id number], Sex: female    Diagnosis  * No Diagnosis Codes entered * * No Diagnosis Codes entered *     Procedures    * OBGYN Delivery  Section    Surgeons      * Corine Kent - Primary    Resident/Fellow/Other Assistant:  Surgeon(s) and Role: Debby He MD PGY-4  Catherine Byrne MD PGY-2    Procedure Summary  Anesthesia: * No anesthesia type entered *  ASA: II  Anesthesia Staff: Anesthesiologist: Shahnaz Agee MD  Anesthesia Resident: Toñito Miles MD; Benjy Moore MD  Estimated Blood Loss: 1159mL         Anesthesia Record               Intraprocedure I/O Totals          Intake    lactated Ringer's bolus 500 mL 1500.00 mL    Oxytocin Drip 0.00 mL    The total shown is the total volume documented since Anesthesia Start was filed.    Cell Saver 70 mL    Total Intake 1570 mL       Output    Urine 150 mL    Total Blood Loss - Surgical Delivery (mL) 1100 mL    Total Output 1250 mL       Net    Net Volume 320 mL       Other (could not be determined as input or output)    Surgical Delivery Estimated Blood Loss (mL) 1100          Specimen: No specimens collected     Staff:   Circulator: Kina Gray RN  Scrub Person: Lauren Hall    Indications: 32 yo  at 26.5wga admitted to the antepartum service due to sPEC and FGR with abnormal dopplers. Fetal deceleration seen on NST this AM with minimal-moderate variability. UADs shows REDF with ocassional AEDF. Due to worsening fetal tracing and REDF seen on UADs in the s/o PreE with SF recommended moving towards delivery.     Procedure Details:  The patient was taken to the operating room where Combined spinal-epidural  anesthesia was found to be adequate. She was then placed in the dorsal supine position with a left lateral tilt. A handley catheter was placed, SCDs were  applied, and a vaginal prep was performed. A pre-procedure time out was performed.  The patient was given prophylactic dose of IV antibiotics. She was then prepped and draped in the usual sterile fashion. A Pfannenstiel skin incision was made through the skin with the scalpel and then carried through the subcutaneous fat to the underlying fascial layer with sharp dissection. The fascia was incised on either side of the midline with the scalpel, and fascia was then dissected off the rectus muscle bilaterally using sharp dissection with curved Vivar scissors. The superior aspect of the incision was grasped, tented up with Kocher clamps and the rectus muscle was dissected off bluntly. The muscles were divided in the midline, the peritoneum was identified and then entered bluntly, and incision extended superiorly and inferiorly with good visualization of bladder below. Bladder blade was inserted. A classical uterine incision was made with the scalpel, the uterine cavity was entered, and the hysterotomy was extended cephalocaudal with bandage scissors. The infant was delivered atraumatically, the cord was clamped and cut, and infant was handed off to the awaiting nursing staff. A cord segment and blood sample were collected. The placenta was then expressed. The uterus was exteriorized and cleared of all clot and debris. The uterine incision was repaired in three layers using a running stitch of 0-Vicryl. A second layer of the same suture was placed in an imbricating fashion. A baseball stitch of 2-0 Monocryl was used to reapproximate the serosa. Persistent oozing was noted from the hysterotomy with significant uterine atony. She was given 2nd pitocin bolus, hemabate injection x3, 10cc of intrauterine pitocin and buccal Cytotec. Minimal improvement was noted in the uterine atony therefore decision was made to place B-Hunt suture using 2-0 chromic. Uterine atony was then improved. She required second imbricating layer  along this hysterotomy using 2-0 vicryl and several figure of stitches with 3-0 Monocryl. Small amount of oozing noted along the hysterotomy and Interceed and Avitene were placed. Good hemostasis was then noted. The uterus was then placed back inside the pelvis, the gutters were cleared of all clots and debris. The hysterotomy was again evaluated and found to be hemostatic, and the underside of the fascia and bladder and the rectus muscles were also found to be hemostatic. The fascia was closed using a running stitch of 0-Vicryl. The subcutaneous layer was irrigated, small bleeding vessels were cauterized, and the subcutaneous layer was reapproximated using three interrupted stitches of 2-0 Monocryl. The skin was closed with 4-0 Monocryl on a curved needle.  All counts were correct, the patient tolerated the procedure well. Dr. Kent was present for all key portions of the procedure. The patient was taken back to the recovery room in stable condition.    Findings: Normal appearing gravid uterus, fallopian tubes, and ovaries. Amniotic fluid clear, male infant in cephalic presentation      Gestational Age: 26w6d  /Para:   Quantitative Blood Loss: Admission to Discharge: 2259 mL (2024  1:15 PM - 2024  2:54 AM)    Caden Perez [[id number]]      Labor Events    Rupture date/time: 2024  Rupture type: Artificial  Fluid color: Clear  Labor type:  Without Labor  Labor allowed to proceed with plans for an attempted vaginal birth?: No  Complications: Hemorrhage, Uterine Atony       Labor Event Times    Labor onset date/time: 2024 1810       Labor Length    3rd stage: 0h 03m       Placenta    Placenta delivery date/time: 2024  Placenta removal: Manual removal  Placenta appearance: Intact  Placenta disposition: pathology       Cord    Vessels: 3 vessels  Complications: None  Delayed cord clamping?: Yes  Cord clamped date/time: 2024  Cord blood disposition:  Lab  Gases sent?: No       Lacerations    Episiotomy: None  Perineal laceration: None  Other lacerations?: No  Repair suture: None       Anesthesia    Method: Combined spinal-epidural       Operative Delivery    Forceps attempted?: No  Vacuum extractor attempted?: No       Shoulder Dystocia    Shoulder dystocia present?: No       [place] Delivery    Birth date/time: 2024 22:07:00  Delivery type: , Classical   categorization: primary   priority: routine  Indications for : Breech  Incision type: classical  Complications: Hemorrhage, Uterine Atony       Resuscitation    Method: Suctioning, Tactile stimulation, Endotracheal Intubation, Positive pressure ventilation (PPV), Continuous positive airway pressure (CPAP)       Apgars    Living status: Living  Apgar Component Scores:  1 min.:  5 min.:  10 min.:  15 min.:  20 min.:    Skin color:  0  1  1      Heart rate:  1  1  2      Reflex irritability:  1  1  1      Muscle tone:  0  0  1      Respiratory effort:  0  1  2      Total:  2  4  7             Delivery Providers    Delivering clinician: Corine Kent MD   Provider Role    Kina Gray RN Delivery Nurse    Soumya Sosa RN Nursery Nurse    Catherine Byrne MD Resident    Debby He MD Resident                 Catherine Byrne MD

## 2024-02-17 NOTE — ANESTHESIA PREPROCEDURE EVALUATION
Patient: Emelyn Perez    Evaluation Method: In-person visit    Procedure Information    Date: 02/16/24  Procedure: Procedure Not Yet Scheduled         Relevant Problems   Anesthesia (within normal limits)      Cardiovascular   (+) Severe pre-eclampsia in second trimester      Endocrine (within normal limits)      GI (within normal limits)      /Renal (within normal limits)      Neuro/Psych (within normal limits)      Pulmonary (within normal limits)      GI/Hepatic (within normal limits)      Hematology (within normal limits)      Musculoskeletal (within normal limits)       Clinical information reviewed:   Tobacco  Allergies  Meds   Med Hx  Surg Hx   Fam Hx          NPO Detail:  No data recorded     OB/GYN     Physical Exam    Airway  Mallampati: III  TM distance: >3 FB  Neck ROM: full     Cardiovascular - normal exam  Rhythm: regular  Rate: normal     Dental - normal exam     Pulmonary - normal exam  Breath sounds clear to auscultation     Abdominal            Anesthesia Plan    History of general anesthesia?: yes  History of complications of general anesthesia?: no    ASA 2     CSE     The patient is not a current smoker.  Patient did not smoke on day of procedure.    Anesthetic plan and risks discussed with patient.  Use of blood products discussed with patient who consented to blood products.

## 2024-02-17 NOTE — PROGRESS NOTES
Postpartum Progress Note    Assessment/Plan   Emelyn Perez is a 33 y.o., , who delivered at 26w5d gestation and is now postpartum day 1 from classical primary CS.    Postoperative Care  - pain well controlled with current regimen  - Hgb 11.9 on admission > EBL 1200 > 5hr postop cbc 11.0 > repeat set pending  - soft Bps, MAPs >65, no sxs anemia, benign abdominal exam. Suspect 2/2 rapid BP normalization, continue to monitor  - tolerating regular diet with antiemetics and bowel regiment prn  - dc handley on POD#1 > follow up void  - not ambulating while on Mag    sPEC  - Diagnosed by severe range BP requiring IV tx  - last IV tx pm   - HELLP labs negative over multiple sets, P:C 1.43  - continue Mg until 2200 this pm, no sxs toxocity  - Current BP Regimen: nifed 60 BID + Lab 600 BID -> all meds HELD today in s/o soft Bps as above. Continue to monitor and restart meds as needed    Postpartum Care  - Feeding: pumping, baby in NICU ; Lactation consult as needed  - ppBC: declines  - DVT prophylaxis: lovenox ppx    D/w Dr. Sharda Prescott MD  PGY-2, Obstetrics and Gynecology      Principal Problem:    Severe pre-eclampsia in second trimester  Active Problems:    Poor fetal growth affecting management of mother in second trimester    Pregnancy Problems (from 10/20/23 to present)       Problem Noted Resolved    Severe pre-eclampsia in second trimester 2024 by Edenilson Rodriguez MD No    Priority:  Medium      Poor fetal growth affecting management of mother in second trimester 2024 by Edenilson Rodriguez MD No    Priority:  Medium      Marginal insertion of umbilical cord affecting management of mother 2024 by Radha Abad MD No    Priority:  Medium      Overview Signed 2024  2:58 PM by Radha Abad MD     -serial growth         Encounter for supervision of normal first pregnancy in first trimester 10/20/2023 by Radha Abad MD No    Priority:  Medium      Overview Addendum  2023  5:11 PM by Radha Abad MD     -s/p flu shot 10/20  -declines NIPS                section delivery  Patient is currently breastfeedingThe patient's blood type is O POS. The baby's blood type is O POS . Rhogam is not indicated.    Subjective   Her pain is well controlled with current medications  She is not passing flatus  She is not ambulating while on mag  She is tolerating a Adult diet Regular  She reports no breast or nursing problems  She denies emotional concerns today   Her plan for contraception is none     Denies HA, vision changes, CP, SOB, or RUQ pain. Denies dizziness/lightheadeness. Lochia is light.     Objective   Allergies:   Patient has no known allergies.         Last Vitals:  Temp Pulse Resp BP MAP Pulse Ox   36.6 °C (97.9 °F) 66 16 97/67   98 %     Vitals Min/Max Last 24 Hours:  Temp  Min: 36.2 °C (97.2 °F)  Max: 37.1 °C (98.8 °F)  Pulse  Min: 61  Max: 163  Resp  Min: 12  Max: 18  BP  Min: 93/54  Max: 146/63      Physical Exam:  General: Well appearing, alert  HEENT: normocephalic, EOMI, clear sclera  Cardio: Warm and well perfused, RRR  Resp: breathing comfortably on room air, CTAB  Abd: soft, appropriately tender, incision c/d/I covered in pressure dressing  Neuro: grossly intact, no focal deficits, DTRs 2+  Extremities: full ROM, no calf tenderness  Psych: A&O x3, appropriate mood and affect    Lab Data:  Results from last 7 days   Lab Units 24  0403 24  2306 02/15/24  0849 24  0911   WBC AUTO x10*3/uL 30.8* 18.6*  --  18.6*   HEMOGLOBIN g/dL 11.0* 11.9*  --  13.3   HEMATOCRIT % 33.5* 34.2*  --  39.5   PLATELETS AUTO x10*3/uL 263 258  --  311   AST U/L  --   --  27 20   ALT U/L  --   --  37 29   CREATININE mg/dL  --   --  0.63 0.68

## 2024-02-17 NOTE — SIGNIFICANT EVENT
RN at bedside with patient discussing pumping for  in NICU, mother acknowledges understanding and is attentive to learning as FOB at bedside educating as well. Patient pumped with the hospital provided breast pump for 35 minutes. RN was helped and educated patient on how to setup, start, and clean the breast pump machine. During this first session colostrum was produced from both breast but rubbed into nipples.  Emelyn is eager to feed her baby and would like to be educated more with lactation consults.

## 2024-02-18 LAB
BLOOD EXPIRATION DATE: NORMAL
DISPENSE STATUS: NORMAL
PRODUCT BLOOD TYPE: 5100
PRODUCT CODE: NORMAL
UNIT ABO: NORMAL
UNIT NUMBER: NORMAL
UNIT RH: NORMAL
UNIT VOLUME: 218

## 2024-02-18 PROCEDURE — 2500000002 HC RX 250 W HCPCS SELF ADMINISTERED DRUGS (ALT 637 FOR MEDICARE OP, ALT 636 FOR OP/ED)

## 2024-02-18 PROCEDURE — 2500000001 HC RX 250 WO HCPCS SELF ADMINISTERED DRUGS (ALT 637 FOR MEDICARE OP)

## 2024-02-18 PROCEDURE — 1210000001 HC SEMI-PRIVATE ROOM DAILY

## 2024-02-18 PROCEDURE — 2500000004 HC RX 250 GENERAL PHARMACY W/ HCPCS (ALT 636 FOR OP/ED)

## 2024-02-18 RX ORDER — NIFEDIPINE 30 MG/1
30 TABLET, FILM COATED, EXTENDED RELEASE ORAL
Status: DISCONTINUED | OUTPATIENT
Start: 2024-02-18 | End: 2024-02-19

## 2024-02-18 RX ADMIN — NIFEDIPINE 30 MG: 30 TABLET, FILM COATED, EXTENDED RELEASE ORAL at 10:06

## 2024-02-18 RX ADMIN — ACETAMINOPHEN 975 MG: 325 TABLET ORAL at 06:04

## 2024-02-18 RX ADMIN — ACETAMINOPHEN 975 MG: 325 TABLET ORAL at 12:01

## 2024-02-18 RX ADMIN — ENOXAPARIN SODIUM 40 MG: 100 INJECTION SUBCUTANEOUS at 19:00

## 2024-02-18 RX ADMIN — OXYCODONE HYDROCHLORIDE 10 MG: 5 TABLET ORAL at 05:27

## 2024-02-18 RX ADMIN — SIMETHICONE 80 MG: 80 TABLET, CHEWABLE ORAL at 05:27

## 2024-02-18 RX ADMIN — IBUPROFEN 600 MG: 600 TABLET, FILM COATED ORAL at 00:04

## 2024-02-18 RX ADMIN — IBUPROFEN 600 MG: 600 TABLET, FILM COATED ORAL at 06:04

## 2024-02-18 RX ADMIN — ACETAMINOPHEN 975 MG: 325 TABLET ORAL at 17:58

## 2024-02-18 RX ADMIN — OXYCODONE HYDROCHLORIDE 5 MG: 5 TABLET ORAL at 16:31

## 2024-02-18 RX ADMIN — SIMETHICONE 80 MG: 80 TABLET, CHEWABLE ORAL at 10:06

## 2024-02-18 RX ADMIN — ACETAMINOPHEN 975 MG: 325 TABLET ORAL at 00:04

## 2024-02-18 RX ADMIN — IBUPROFEN 600 MG: 600 TABLET, FILM COATED ORAL at 17:58

## 2024-02-18 RX ADMIN — POLYETHYLENE GLYCOL 3350 17 G: 17 POWDER, FOR SOLUTION ORAL at 10:06

## 2024-02-18 RX ADMIN — IBUPROFEN 600 MG: 600 TABLET, FILM COATED ORAL at 12:01

## 2024-02-18 ASSESSMENT — PAIN SCALES - GENERAL
PAINLEVEL_OUTOF10: 3
PAINLEVEL_OUTOF10: 5 - MODERATE PAIN
PAINLEVEL_OUTOF10: 5 - MODERATE PAIN
PAINLEVEL_OUTOF10: 3
PAINLEVEL_OUTOF10: 10 - WORST POSSIBLE PAIN
PAINLEVEL_OUTOF10: 3
PAINLEVEL_OUTOF10: 4
PAINLEVEL_OUTOF10: 7
PAINLEVEL_OUTOF10: 4

## 2024-02-18 ASSESSMENT — PAIN DESCRIPTION - DESCRIPTORS
DESCRIPTORS: PRESSURE
DESCRIPTORS: PRESSURE;CRAMPING
DESCRIPTORS: ACHING;SORE
DESCRIPTORS: PRESSURE
DESCRIPTORS: ACHING;SORE

## 2024-02-18 ASSESSMENT — PAIN DESCRIPTION - LOCATION
LOCATION: ABDOMEN
LOCATION: ABDOMEN

## 2024-02-18 NOTE — CARE PLAN
Problem: Postpartum  Goal: Experiences normal postpartum course  Outcome: Progressing  Goal: Appropriate maternal -  bonding  Outcome: Progressing  Goal: Establish and maintain infant feeding pattern for adequate nutrition  Outcome: Progressing     Problem:  Recovery Care  Goal: Manages discomfort  Outcome: Met     Problem: Skin  Goal: Decreased wound size/increased tissue granulation at next dressing change  Outcome: Met  Goal: Participates in plan/prevention/treatment measures  Outcome: Met  Goal: Prevent/manage excess moisture  Outcome: Met  Goal: Prevent/minimize sheer/friction injuries  Outcome: Met  Goal: Promote/optimize nutrition  Outcome: Met  Goal: Promote skin healing  Outcome: Progressing   The patient's goals for the shift include managing pain and discomfort so she can see her baby in the NICU.     The clinical goals for the shift include Maintain BP <160/110 and pain management.

## 2024-02-18 NOTE — PROGRESS NOTES
Postpartum Progress Note    Assessment/Plan   Emelyn Perez is a 33 y.o., , who delivered at 26w5d gestation    Now PPD#2 s/p , Classical  on 2024   - continue routine postpartum care  - pain well controlled on po medications  - dvt risk score DVT Score: 6 , ppx with lovenox   - Hgb:   Results from last 7 days   Lab Units 24  1314 24  0403 24  2306   HEMOGLOBIN g/dL 10.9* 11.0* 11.9*      sPEC  - Diagnosed by severe range BP requiring IV tx  - last IV tx pm   - HELLP labs negative over multiple sets, P:C 1.43  - continue Mg until 2200 this pm, no sxs toxocity  - Current BP Regimen: nifed 60 BID + Lab 600 BID -> all meds HELD  in s/o soft Bps --> restarted Nifed 30mg daily and will continue to monitor Bps     Maternal Well-Being  - emotional support provided    [place] Feeding  - breastfeeding/pumping encouraged; lactation consult prn    Contraception  - education provided  - Patient defers contraception to her 6 wk PP f/u. Recommended pelvic rest and condoms. Reviewed risk of short interval pregnancy,  pt verbalizes understanding.     Dispo  - Anticipate DC PPD3 pending BP control.  - The signs and symptoms of PEC were reviewed with the patient, including unrelenting headache, vision changes/blurred vision, and pain underneath the right breast.   - BP cuff for home for checking BP BID. Pt instructed to call primary OB if SBP > 160 or DBP > 110.   Follow up in 2-5 days for BP check with your OB provider., Follow up in 2 wks for incision check with your OB provider., and Follow up in 4-6 wk for postpartum visit with your OB provider.     Phoebe Talbot PA-C  Postpartum Pager [phone]    Principal Problem:    Severe pre-eclampsia in second trimester  Active Problems:    Poor fetal growth affecting management of mother in second trimester    Pregnancy Problems (from 10/20/23 to present)       Problem Noted Resolved    Severe pre-eclampsia in second trimester 2024 by  Edenilson Rodriguez MD No    Poor fetal growth affecting management of mother in second trimester 2024 by Edenilson Rodriguez MD No    Marginal insertion of umbilical cord affecting management of mother 2024 by Radha Abad MD No    Overview Signed 2024  2:58 PM by Radha Abad MD     -serial growth         Encounter for supervision of normal first pregnancy in first trimester 10/20/2023 by Radha Abad MD No    Overview Addendum 2023  5:11 PM by Radha Abad MD     -s/p flu shot 10/20  -[facility] course: postpartum preeclampsia/eclampsia   section delivery  Patient is currently breastfeedingThe patient's blood type is O POS. The baby's blood type is O POS . Rhogam is not indicated.    Subjective   Her pain is well controlled with current medications  She is passing flatus  She is ambulating well  She is tolerating a Adult diet Regular  She reports no breast or nursing problems  She denies emotional concerns today   Her plan for contraception is none     Pt seen at the bedside in NAD. Denies HA, N/V, RUQ pain, vision changes.   Denies CP, SOB, calf pain, fever, passage of large clots.     Objective   Allergies:   Patient has no known allergies.         Last Vitals:  Temp Pulse Resp BP MAP Pulse Ox   37.7 °C (99.9 °F) 86 18 125/78   96 %     Vitals Min/Max Last 24 Hours:  Temp  Min: 36.5 °C (97.7 °F)  Max: 37.7 °C (99.9 °F)  Pulse  Min: 58  Max: 86  Resp  Min: 16  Max: 18  BP  Min: 108/69  Max: 128/88    Intake/Output:     Intake/Output Summary (Last 24 hours) at 2024 1650  Last data filed at 2024 0559  Gross per 24 hour   Intake 883.9 ml   Output 2875 ml   Net -1991.1 ml       Physical Exam:  General: Examination reveals a well developed, well nourished, female, in no acute distress. She is alert and cooperative.  HEENT: External ears normal. Nose normal, no erythema or discharge.  Neck: supple, no significant adenopathy.  Lungs: breathing even and  unlabored   Cardiac: warm and well perfused .  Fundus: firm and below umbilicus. Lochia light  Extremities: no redness or tenderness in the calves or thighs, no edema.  Neurological: alert, oriented, normal speech, no focal findings or movement disorder noted.  Psychological: awake and alert; oriented to person, place, and time.  Skin: incision clean, dry, intact, well approximated, no redness, drainage, or warmth     Lab Data:  Labs in chart were reviewed.

## 2024-02-18 NOTE — PROGRESS NOTES
Postpartum Progress Note    Assessment/Plan   Emelyn Perez is a 33 y.o., , who delivered at 26w5d gestation and is now postpartum day 1 from classical primary CS.    Postoperative Care  - pain well controlled with current regimen  - Hgb 11.9 on admission > EBL 1200 > 5hr postop cbc 11.0 > 10.9 this AM  - soft Bps, MAPs >65, no sxs anemia, benign abdominal exam. Suspect 2/2 rapid BP normalization, continue to monitor  - tolerating regular diet with antiemetics and bowel regiment prn  - UOP adequate  - not ambulating while on Mag    sPEC  - Diagnosed by severe range BP requiring IV tx  - last IV tx pm   - HELLP labs negative over multiple sets, P:C 1.43  - continue Mg until 2200 this pm, no sxs toxocity  - Current BP Regimen: nifed 60 BID + Lab 600 BID -> all meds HELD today in s/o soft Bps as above. Continue to monitor and restart meds as needed    Postpartum Care  - Feeding: pumping, baby in NICU ; Lactation consult as needed  - ppBC: declines  - DVT prophylaxis: lovenox ppx    D/w Dr. Beau Byrne MD  PGY-2, Obstetrics and Gynecology      Principal Problem:    Severe pre-eclampsia in second trimester  Active Problems:    Poor fetal growth affecting management of mother in second trimester    Pregnancy Problems (from 10/20/23 to present)       Problem Noted Resolved    Severe pre-eclampsia in second trimester 2024 by Edenilson Rodriguez MD No    Priority:  Medium      Poor fetal growth affecting management of mother in second trimester 2024 by Edenilson Rodriguez MD No    Priority:  Medium      Marginal insertion of umbilical cord affecting management of mother 2024 by Radha Abad MD No    Priority:  Medium      Overview Signed 2024  2:58 PM by Radha Abad MD     -serial growth         Encounter for supervision of normal first pregnancy in first trimester 10/20/2023 by Radha Abad MD No    Priority:  Medium      Overview Addendum 2023  5:11 PM by Radha Abad MD      -s/p flu shot 10/20  -declines NIPS                section delivery  Patient is currently breastfeedingThe patient's blood type is O POS. The baby's blood type is O POS . Rhogam is not indicated.    Subjective   Her pain is well controlled with current medications  She is not passing flatus  She is not ambulating while on mag  She is tolerating a Adult diet Regular  She reports no breast or nursing problems  She denies emotional concerns today   Her plan for contraception is none     Denies HA, vision changes, CP, SOB, or RUQ pain, or epigastric pain. Lochia is minimal.    Objective   Allergies:   Patient has no known allergies.         Last Vitals:  Temp Pulse Resp BP MAP Pulse Ox   36.8 °C (98.2 °F) 65 16 113/79   97 %     Vitals Min/Max Last 24 Hours:  Temp  Min: 36.2 °C (97.2 °F)  Max: 37.1 °C (98.8 °F)  Pulse  Min: 58  Max: 163  Resp  Min: 12  Max: 18  BP  Min: 93/54  Max: 146/63      Physical Exam:  General: Well appearing, alert  HEENT: normocephalic, EOMI, clear sclera  Cardio: Warm and well perfused, RRR  Resp: breathing comfortably on room air, CTAB  Abd: soft, appropriately tender, incision c/d/I covered in pressure dressing  Neuro: grossly intact, no focal deficits, DTRs 2+  Extremities: full ROM, no calf tenderness  Psych: A&O x3, appropriate mood and affect    Lab Data:  Results from last 7 days   Lab Units 24  1314 24  0403 24  2306 02/15/24  0849 24  0911   WBC AUTO x10*3/uL 25.2* 30.8* 18.6*  --  18.6*   HEMOGLOBIN g/dL 10.9* 11.0* 11.9*  --  13.3   HEMATOCRIT % 32.5* 33.5* 34.2*  --  39.5   PLATELETS AUTO x10*3/uL 271 263 258  --  311   AST U/L 31  --   --  27 20   ALT U/L 31  --   --  37 29   CREATININE mg/dL 0.74  --   --  0.63 0.68

## 2024-02-18 NOTE — ANESTHESIA POSTPROCEDURE EVALUATION
Patient: Emelyn Perez    Procedure Summary       Date: 24 Room / Location: Virtual [facility] OB    Anesthesia Start:  Anesthesia Stop: 24    Procedure: OBGYN Delivery  Section Diagnosis: (Other (Add Comments))    Surgeons: Corine Kent MD Responsible Provider: Shahnaz Agee MD    Anesthesia Type: CSE ASA Status: 2            Anesthesia Type: CSE        Anesthesia Post Evaluation    Patient location during evaluation: floor  Patient participation: complete - patient participated  Level of consciousness: awake  Pain management: adequate  Airway patency: patent  Cardiovascular status: acceptable  Respiratory status: acceptable  Hydration status: acceptable  Postoperative Nausea and Vomiting: none  Comments: Neuraxial site assessed. No visible redness or swelling or drainage. Patient able to ambulate and move all extremities without difficulty. Able to void. No complaints of nausea/vomiting. Tolerating PO intake well. No s/sx of PDPH.          No notable events documented.

## 2024-02-19 LAB
BLOOD EXPIRATION DATE: NORMAL
DISPENSE STATUS: NORMAL
PRODUCT BLOOD TYPE: 5100
PRODUCT CODE: NORMAL
UNIT ABO: NORMAL
UNIT NUMBER: NORMAL
UNIT RH: NORMAL
UNIT VOLUME: 350
XM INTEP: NORMAL

## 2024-02-19 PROCEDURE — 2500000002 HC RX 250 W HCPCS SELF ADMINISTERED DRUGS (ALT 637 FOR MEDICARE OP, ALT 636 FOR OP/ED)

## 2024-02-19 PROCEDURE — 99231 SBSQ HOSP IP/OBS SF/LOW 25: CPT | Performed by: NURSE PRACTITIONER

## 2024-02-19 PROCEDURE — 2500000001 HC RX 250 WO HCPCS SELF ADMINISTERED DRUGS (ALT 637 FOR MEDICARE OP)

## 2024-02-19 PROCEDURE — 2500000002 HC RX 250 W HCPCS SELF ADMINISTERED DRUGS (ALT 637 FOR MEDICARE OP, ALT 636 FOR OP/ED): Performed by: NURSE PRACTITIONER

## 2024-02-19 PROCEDURE — 1210000001 HC SEMI-PRIVATE ROOM DAILY

## 2024-02-19 RX ORDER — NIFEDIPINE 30 MG/1
30 TABLET, FILM COATED, EXTENDED RELEASE ORAL ONCE
Status: DISCONTINUED | OUTPATIENT
Start: 2024-02-19 | End: 2024-02-19

## 2024-02-19 RX ORDER — NIFEDIPINE 60 MG/1
60 TABLET, FILM COATED, EXTENDED RELEASE ORAL
Status: DISCONTINUED | OUTPATIENT
Start: 2024-02-20 | End: 2024-02-19

## 2024-02-19 RX ORDER — NIFEDIPINE 90 MG/1
90 TABLET, EXTENDED RELEASE ORAL
Status: DISCONTINUED | OUTPATIENT
Start: 2024-02-20 | End: 2024-02-21 | Stop reason: HOSPADM

## 2024-02-19 RX ORDER — NIFEDIPINE 60 MG/1
60 TABLET, FILM COATED, EXTENDED RELEASE ORAL ONCE
Status: COMPLETED | OUTPATIENT
Start: 2024-02-19 | End: 2024-02-19

## 2024-02-19 RX ADMIN — IBUPROFEN 600 MG: 600 TABLET, FILM COATED ORAL at 00:07

## 2024-02-19 RX ADMIN — NIFEDIPINE 60 MG: 60 TABLET, FILM COATED, EXTENDED RELEASE ORAL at 13:57

## 2024-02-19 RX ADMIN — NIFEDIPINE 30 MG: 30 TABLET, FILM COATED, EXTENDED RELEASE ORAL at 06:12

## 2024-02-19 RX ADMIN — OXYCODONE HYDROCHLORIDE 5 MG: 5 TABLET ORAL at 00:09

## 2024-02-19 RX ADMIN — OXYCODONE HYDROCHLORIDE 5 MG: 5 TABLET ORAL at 18:23

## 2024-02-19 RX ADMIN — ACETAMINOPHEN 975 MG: 325 TABLET ORAL at 00:07

## 2024-02-19 RX ADMIN — IBUPROFEN 600 MG: 600 TABLET, FILM COATED ORAL at 18:23

## 2024-02-19 RX ADMIN — ACETAMINOPHEN 975 MG: 325 TABLET ORAL at 18:23

## 2024-02-19 RX ADMIN — ACETAMINOPHEN 975 MG: 325 TABLET ORAL at 12:01

## 2024-02-19 RX ADMIN — ACETAMINOPHEN 975 MG: 325 TABLET ORAL at 06:12

## 2024-02-19 RX ADMIN — IBUPROFEN 600 MG: 600 TABLET, FILM COATED ORAL at 06:12

## 2024-02-19 RX ADMIN — IBUPROFEN 600 MG: 600 TABLET, FILM COATED ORAL at 12:01

## 2024-02-19 ASSESSMENT — PAIN DESCRIPTION - DESCRIPTORS
DESCRIPTORS: SORE
DESCRIPTORS: SORE
DESCRIPTORS: CRAMPING
DESCRIPTORS: BURNING;CRAMPING;PRESSURE

## 2024-02-19 ASSESSMENT — PAIN SCALES - GENERAL
PAINLEVEL_OUTOF10: 7
PAINLEVEL_OUTOF10: 3
PAINLEVEL_OUTOF10: 0 - NO PAIN
PAINLEVEL_OUTOF10: 6
PAINLEVEL_OUTOF10: 3
PAINLEVEL_OUTOF10: 2

## 2024-02-19 ASSESSMENT — PAIN DESCRIPTION - LOCATION: LOCATION: ABDOMEN

## 2024-02-19 NOTE — PROGRESS NOTES
Postpartum Progress Note    Assessment/Plan     Emelyn Perez is a 33 y.o., , who initially presented for transfer for severe range BP  She had a , Classical  delivery on 2024  at 26w5d and is now POD3.    sPEC  - Diagnosed by severe range BP requiring IV tx  - last IV tx pm   - HELLP labs negative over multiple sets, P:C 1.43  - s/p Mg  - BP Regimen:   - nifed 60 BID + Lab 600 BID -> all meds HELD  &  for BP's 90/50s  - nifed 30mg restarted daily ()   - 30mg nifed ->1x dose 60 @ 1400 for persistent mild range  - new dose 90mg daily to start tomorrow AM  - do not hold BP meds unless symptomatically hypotensive    #Post-op , Classical   - continue routine postoperative care  - pain well controlled on ERAS protocol  - Hg  11.9  --> EBL 1200 -> 10.9  - DVT Score: 6 SCDs and enoxaparin prophylactic dose daily while inpatient  - The patient's blood type is O POS. The baby's blood type is O POS . Rhogam is not indicated.    #Contraception  Defers contraception to primary OB/PP visit. We discussed pregnancy spacing of at least one year, abstaining from intercourse for 6wks, and the ability to become pregnant in the absence of regular menses. Pt verbalized understanding.     #Maternal Well-Being  - emotional support provided  - NICU SW consult  - [place] feeding: breastfeeding/pumping encouraged; lactation consult prn     #Dispo  - Anticipate DC PPD#4 pending BP control.  - The signs and symptoms of PEC were reviewed with the patient, including unrelenting headache, vision changes/blurred vision, and RUQ pain  - BP cuff for home for checking BP twice a day  - Pt instructed to call primary OB if SBP > 160 or DBP > 110 or if development of PEC symptoms   - On discharge, follow up with primary OB in:       - 2-5 days for BP check       - 2 weeks for incision check       - 4-6 week for post-partum visit     Principal Problem:    Severe pre-eclampsia in second trimester  Active  Problems:    Poor fetal growth affecting management of mother in second trimester      Subjective   Her pain is well controlled with current medications  She is passing flatus  She is ambulating independently  She is tolerating a Adult diet Regular  She is voiding spontaneously  She reports no breast or nursing problems  She denies emotional concerns today     Would like DC today if BP's appropriate.     Objective   Last Vitals:  Temp Pulse Resp BP MAP Pulse Ox   36.8 °C (98.2 °F) 80 16 (!) 143/83   97 %     Vitals Min/Max Last 24 Hours:  Temp  Min: 36.8 °C (98.2 °F)  Max: 37.7 °C (99.9 °F)  Pulse  Min: 72  Max: 92  Resp  Min: 16  Max: 18  BP  Min: 125/78  Max: 147/83    Intake/Output:   No intake or output data in the 24 hours ending 02/19/24 0703    Physical Exam:  General: well appearing, well nourished, postpartum  Obstetric: fundus firm below umbilicus, lochia light  Skin: Warm, dry; no rashes/lesions/erythema; Pfannenstiel incision: clean, dry, well approximated, open to air, negative discharge/warmth/erythema   Breast: No masses, nipple discharge  Neuro: A/Ox3, no gross motor deficit   GI: no distension, appropriately tender, soft, +BS  Respiratory: Even and unlabored on RA, LSCTA BL  Cardiovascular: 2+ BLE edema; No erythema, warmth  Psych: appropriate mood and affect    Lab Data:  Lab Results   Component Value Date    WBC 25.2 (H) 02/17/2024    HGB 10.9 (L) 02/17/2024    HCT 32.5 (L) 02/17/2024     02/17/2024

## 2024-02-19 NOTE — CARE PLAN
The patient's goals for the shift include pain control, bond w/ infant in NICU    The clinical goals for the shift include pain control, no s/sx HDP, meet postpartum milestones    VS stable overnight. BPs <160/110. Pain control improving w/ scheduled Tylenol, Motrin, and PRN oxy 5mg. Meeting postpartum milestones: voiding, +flatus, tolerating PO w/o nausea, bleeding appropriate, ambulating to NICU and bonding w/ infant. Incision KHUSHBOO, clean, dry, well approximated.

## 2024-02-19 NOTE — LACTATION NOTE
Lactation Consultant Note  Lactation Consultation       Maternal Information       Maternal Assessment       Infant Assessment       Feeding Assessment       LATCH TOOL       Breast Pump       Other OB Lactation Tools       Patient Follow-up       Other OB Lactation Documentation       Recommendations/Summary  FOB in room, mother in NICU. He reports that NICU LC team is visiting with mother at this time to address any concerns.

## 2024-02-19 NOTE — CARE PLAN
The patient's goals for the shift include visit infant in NICU, have BM    The clinical goals for the shift include pain control, maintain BP <160/110    Patient remained free from falls/injury throughout shift. Pain well controlled with PO medications. Lochia WNL, incision KHUSHBOO and free from s/sx of infection at this time. VSS, nifed 30 every day started this AM. Patient continuing to attempt pumping on an appropriate schedule, attempted to see lactation today. Resting comfortably in bed, declines needs at this time.

## 2024-02-19 NOTE — LACTATION NOTE
This note was copied from a baby's chart.  Lactation Consultant Note  Lactation Consultation  Reason for Consult: Initial assessment  Consultant Name: Reina Greco  Maternal-Infant separation r/t NICU admission.  Maternal Information  Has mother  before?: No  Infant to breast within first 2 hours of birth?: No  Breastfeeding Delayed Due to: Infant status    Maternal Assessment  Breast Assessment: Small, Full, Filling (Mother finds demonstration of hand expression painful.)  Nipple Assessment: Erect, Red/inflamed (excoriation at nipple areolar-junction bilaterally which mom reports is from previous pumping efforts.)  [place] Assessment: Trauma, Reddened, Other (Comment) (excoriation at nipple areolar-junction bilaterally which mom reports is from previous pumping efforts.)    Infant Assessment  Infant Behavior: Other (Comment) (Infant on oscillator ventilaor.)  Infant Assessment: Premature    Feeding Assessment  Unable to assess infant feeding at this time: Infant unable to breastfeed to alteration in health status    Breast Pump  Pump: Hospital grade electric pump (Moom reports she has pump for home use. Offered [facility] pump and trerms explained.)  Frequency: 5-7 times per day  Duration: 15-20 minutes per session  Breast Shield Size and Type: 21 mm, 24 mm  Volume of Milk Production: 1  Units of Volume: mL per session     Other OB Lactation Documentation  Maternal Risk Factors: Age over 30, primiparity, Other (comment) (IVF)    Recommendations/Summary  Met with parents. Explained availability of [facility] LC services. Instructed on listed patient education, Benefits of mother's own milk for  infant, breast massage & hand expression, CDC pump cleaning & sanitizing guidelines.  Invited to contact LC services as needed.

## 2024-02-20 LAB — SCAN RESULT: NORMAL

## 2024-02-20 PROCEDURE — 2500000002 HC RX 250 W HCPCS SELF ADMINISTERED DRUGS (ALT 637 FOR MEDICARE OP, ALT 636 FOR OP/ED): Performed by: NURSE PRACTITIONER

## 2024-02-20 PROCEDURE — 1210000001 HC SEMI-PRIVATE ROOM DAILY

## 2024-02-20 PROCEDURE — 2500000001 HC RX 250 WO HCPCS SELF ADMINISTERED DRUGS (ALT 637 FOR MEDICARE OP)

## 2024-02-20 PROCEDURE — 99231 SBSQ HOSP IP/OBS SF/LOW 25: CPT | Performed by: NURSE PRACTITIONER

## 2024-02-20 RX ORDER — NIFEDIPINE 30 MG/1
30 TABLET, FILM COATED, EXTENDED RELEASE ORAL EVERY 24 HOURS
Status: DISCONTINUED | OUTPATIENT
Start: 2024-02-20 | End: 2024-02-21 | Stop reason: HOSPADM

## 2024-02-20 RX ORDER — PROPRANOLOL HYDROCHLORIDE 20 MG/1
20 TABLET ORAL 4 TIMES DAILY PRN
Status: DISCONTINUED | OUTPATIENT
Start: 2024-02-20 | End: 2024-02-21 | Stop reason: HOSPADM

## 2024-02-20 RX ADMIN — OXYCODONE HYDROCHLORIDE 10 MG: 5 TABLET ORAL at 05:21

## 2024-02-20 RX ADMIN — ACETAMINOPHEN 975 MG: 325 TABLET ORAL at 18:24

## 2024-02-20 RX ADMIN — ACETAMINOPHEN 975 MG: 325 TABLET ORAL at 00:50

## 2024-02-20 RX ADMIN — ACETAMINOPHEN 975 MG: 325 TABLET ORAL at 06:40

## 2024-02-20 RX ADMIN — IBUPROFEN 600 MG: 600 TABLET, FILM COATED ORAL at 18:25

## 2024-02-20 RX ADMIN — ACETAMINOPHEN 975 MG: 325 TABLET ORAL at 12:18

## 2024-02-20 RX ADMIN — IBUPROFEN 600 MG: 600 TABLET, FILM COATED ORAL at 06:40

## 2024-02-20 RX ADMIN — IBUPROFEN 600 MG: 600 TABLET, FILM COATED ORAL at 00:50

## 2024-02-20 RX ADMIN — OXYCODONE HYDROCHLORIDE 5 MG: 5 TABLET ORAL at 00:50

## 2024-02-20 RX ADMIN — IBUPROFEN 600 MG: 600 TABLET, FILM COATED ORAL at 12:18

## 2024-02-20 RX ADMIN — PROPRANOLOL HYDROCHLORIDE 20 MG: 20 TABLET ORAL at 12:19

## 2024-02-20 RX ADMIN — NIFEDIPINE 30 MG: 30 TABLET, FILM COATED, EXTENDED RELEASE ORAL at 18:24

## 2024-02-20 RX ADMIN — NIFEDIPINE 90 MG: 90 TABLET, FILM COATED, EXTENDED RELEASE ORAL at 06:40

## 2024-02-20 ASSESSMENT — PAIN SCALES - GENERAL
PAINLEVEL_OUTOF10: 6
PAINLEVEL_OUTOF10: 3
PAINLEVEL_OUTOF10: 4
PAINLEVEL_OUTOF10: 2
PAINLEVEL_OUTOF10: 4
PAINLEVEL_OUTOF10: 9

## 2024-02-20 ASSESSMENT — PAIN DESCRIPTION - DESCRIPTORS
DESCRIPTORS: SORE;CRAMPING

## 2024-02-20 ASSESSMENT — PAIN - FUNCTIONAL ASSESSMENT
PAIN_FUNCTIONAL_ASSESSMENT: 0-10
PAIN_FUNCTIONAL_ASSESSMENT: 0-10

## 2024-02-20 NOTE — PROGRESS NOTES
Postpartum Progress Note    Assessment/Plan     Emelyn Perez is a 33 y.o., , who initially presented for transfer for severe range BP  She had a , Classical  delivery on 2024  at 26w5d and is now POD4.    #Acute anxiety attack  - emotional support provided  - prn propranolol added, d/w pt  - pt to NICU to be with baby, defer VS and management of sPEC given circumstances of baby condition    #sPEC  - severe range BP 0916 (see significant event note)  - BP Regimen:   - 30mg nifed -> 60 -> 90mg  - Diagnosed by severe range BP requiring IV tx  - HELLP labs negative over multiple sets, P:C 1.43  - s/p Mg    #Post-op , Classical   - continue routine postoperative care  - pain well controlled on ERAS protocol  - d/w pt abdominal exam and inability to palpate fundus. Firm abdominal exam c/w prenatal care and assessment. No concern for infectious process @ this time.  - Hg  11.9  --> EBL 1200 -> 10.9  - DVT Score: 6 SCDs and enoxaparin prophylactic dose daily while inpatient  - The patient's blood type is O POS. The baby's blood type is O POS . Rhogam is not indicated.    #Contraception  Defers     #Maternal Well-Being  - emotional support provided  - NICU SW consult  - [place] feeding: breastfeeding/pumping encouraged; lactation consult prn     #Dispo  - Defer clinical management of mom pending baby dispo.  - The signs and symptoms of PEC were reviewed with the patient, including unrelenting headache, vision changes/blurred vision, and RUQ pain  - BP cuff for home for checking BP twice a day  - Pt instructed to call primary OB if SBP > 160 or DBP > 110 or if development of PEC symptoms   - On discharge, follow up with primary OB in:       - 2-5 days for BP check       - 2 weeks for incision check       - 4-6 week for post-partum visit     Principal Problem:    Severe pre-eclampsia in second trimester  Active Problems:    Poor fetal growth affecting management of mother in second  trimester      Subjective   Her pain is well controlled with current medications  She is passing flatus  She is ambulating independently  She is tolerating a Adult diet Regular  She is voiding spontaneously  She reports no breast or nursing problems    Pt in moderate emotional distress. Consolable. Baby condition deteriorating rapidly.     Objective   Last Vitals:  Temp Pulse Resp BP MAP Pulse Ox   37.3 °C (99.1 °F) 87 18 (!) 164/92   99 %     Vitals Min/Max Last 24 Hours:  Temp  Min: 36.9 °C (98.4 °F)  Max: 37.4 °C (99.3 °F)  Pulse  Min: 76  Max: 91  Resp  Min: 12  Max: 18  BP  Min: 128/80  Max: 164/92    Intake/Output:   No intake or output data in the 24 hours ending 02/20/24 1009    Physical Exam:  General: well appearing, well nourished, postpartum  Obstetric: unable to palpate fundus 2/2 tigh abdomen and pain; lochia light  Skin: Warm, dry; no rashes/lesions/erythema; Pfannenstiel incision: clean, dry, well approximated, open to air, negative discharge/warmth/erythema   Breast: No masses, nipple discharge  Neuro: A/Ox3, no gross motor deficit   GI: no distension, appropriately tender, soft, +BS  Respiratory: Even and unlabored on RA, LSCTA BL  Cardiovascular: 2+ BLE edema; cool, good cap refill BL. No erythema, warmth  Psych: distraught, emotional, consolable    Lab Data:  Lab Results   Component Value Date    WBC 25.2 (H) 02/17/2024    HGB 10.9 (L) 02/17/2024    HCT 32.5 (L) 02/17/2024     02/17/2024

## 2024-02-20 NOTE — SIGNIFICANT EVENT
Pt had severe range BP. Denies HA, N/V, RUQ pain, vision changes.   Just received news that baby is not doing well and she should not leave the hospital. Pt endorsing being in hell, feeling guilty, shocked, numb, empty, lost, out of control. The crying is causing increased pain.  Physically: tachypnic, sobbing, moderate distress, able to converse in full sentences. Taking phone calls from  and mom who is present @ NICU during baby procedure. pt hasn't slept or eaten. Given saltine crackers and water which she took.    Assessment/Plan  sPEC exacerbated by acute anxiety attack ISO acute stressor ( birth/NICU admission/precarious status of )  - extensive emotional support provided  - discussed risks/benefits of DC today  - defer DC plan 2/2 baby status  - OK to defer recheck of severe range BP given circumstances  - reassess BP when/if pt able   - possible DC later today with prn propranol for anxiety    sPEC  - see progress note    EMILIANA Frye-CNP

## 2024-02-20 NOTE — CARE PLAN
The patient's goals for the shift include visit infant in NICU, see lactation    The clinical goals for the shift include Meet appropriate PP milestones, BP <160/110    Pt meeting appropriate PP milestones. Had one severe BP in the setting of anxiety regarding baby boy in NICU. Pt remains as/sx of HDP.   Problem: Postpartum  Goal: Experiences normal postpartum course  Outcome: Progressing  Goal: Appropriate maternal -  bonding  Outcome: Progressing  Goal: Establish and maintain infant feeding pattern for adequate nutrition  Outcome: Progressing     Problem: Skin  Goal: Promote skin healing  Outcome: Progressing

## 2024-02-21 ENCOUNTER — PHARMACY VISIT (OUTPATIENT)
Dept: PHARMACY | Facility: CLINIC | Age: 34
End: 2024-02-21
Payer: COMMERCIAL

## 2024-02-21 VITALS
HEIGHT: 67 IN | RESPIRATION RATE: 18 BRPM | HEART RATE: 99 BPM | SYSTOLIC BLOOD PRESSURE: 135 MMHG | BODY MASS INDEX: 27.06 KG/M2 | TEMPERATURE: 97.5 F | WEIGHT: 172.4 LBS | DIASTOLIC BLOOD PRESSURE: 87 MMHG | OXYGEN SATURATION: 96 %

## 2024-02-21 PROCEDURE — RXMED WILLOW AMBULATORY MEDICATION CHARGE

## 2024-02-21 PROCEDURE — 2500000002 HC RX 250 W HCPCS SELF ADMINISTERED DRUGS (ALT 637 FOR MEDICARE OP, ALT 636 FOR OP/ED): Performed by: NURSE PRACTITIONER

## 2024-02-21 PROCEDURE — 2500000001 HC RX 250 WO HCPCS SELF ADMINISTERED DRUGS (ALT 637 FOR MEDICARE OP)

## 2024-02-21 RX ORDER — ACETAMINOPHEN 500 MG
1000 TABLET ORAL EVERY 6 HOURS PRN
Qty: 120 TABLET | Refills: 0 | Status: SHIPPED | OUTPATIENT
Start: 2024-02-21

## 2024-02-21 RX ORDER — IBUPROFEN 600 MG/1
600 TABLET ORAL EVERY 6 HOURS PRN
Qty: 120 TABLET | Refills: 0 | Status: SHIPPED | OUTPATIENT
Start: 2024-02-21 | End: 2024-03-22

## 2024-02-21 RX ORDER — NIFEDIPINE 30 MG/1
30 TABLET, FILM COATED, EXTENDED RELEASE ORAL NIGHTLY
Qty: 30 TABLET | Refills: 1 | Status: SHIPPED | OUTPATIENT
Start: 2024-02-21 | End: 2024-04-21

## 2024-02-21 RX ORDER — NIFEDIPINE 90 MG/1
90 TABLET, EXTENDED RELEASE ORAL
Qty: 30 TABLET | Refills: 1 | Status: SHIPPED | OUTPATIENT
Start: 2024-02-22 | End: 2024-04-22

## 2024-02-21 RX ORDER — DOCUSATE SODIUM 100 MG/1
100 CAPSULE, LIQUID FILLED ORAL 2 TIMES DAILY PRN
Qty: 60 CAPSULE | Refills: 0 | Status: SHIPPED | OUTPATIENT
Start: 2024-02-21 | End: 2024-03-22

## 2024-02-21 RX ORDER — OXYCODONE HYDROCHLORIDE 5 MG/1
5 TABLET ORAL EVERY 6 HOURS PRN
Qty: 10 TABLET | Refills: 0 | Status: SHIPPED | OUTPATIENT
Start: 2024-02-21 | End: 2024-02-23 | Stop reason: SDUPTHER

## 2024-02-21 RX ADMIN — NIFEDIPINE 90 MG: 90 TABLET, FILM COATED, EXTENDED RELEASE ORAL at 06:09

## 2024-02-21 RX ADMIN — ACETAMINOPHEN 975 MG: 325 TABLET ORAL at 12:23

## 2024-02-21 RX ADMIN — IBUPROFEN 600 MG: 600 TABLET, FILM COATED ORAL at 12:23

## 2024-02-21 RX ADMIN — ACETAMINOPHEN 975 MG: 325 TABLET ORAL at 06:08

## 2024-02-21 RX ADMIN — IBUPROFEN 600 MG: 600 TABLET, FILM COATED ORAL at 06:08

## 2024-02-21 ASSESSMENT — PAIN SCALES - GENERAL
PAINLEVEL_OUTOF10: 0 - NO PAIN
PAINLEVEL_OUTOF10: 4
PAINLEVEL_OUTOF10: 4

## 2024-02-21 NOTE — CARE PLAN
The patient's goals for the shift include visit infant in NICU, see lactation    The clinical goals for the shift include maintain bp<160/110, get some sleep     Patient remains stable this shift. Patient has controlled pain with PO pain meds. Patient BP controlled with antihypertensives. Patient has been to nicu multiple times to visit baby. Patient is to be discharged.

## 2024-02-22 ENCOUNTER — SPECIALTY PHARMACY (OUTPATIENT)
Dept: PHARMACY | Facility: CLINIC | Age: 34
End: 2024-02-22

## 2024-02-23 ENCOUNTER — POSTPARTUM VISIT (OUTPATIENT)
Dept: OBSTETRICS AND GYNECOLOGY | Facility: CLINIC | Age: 34
End: 2024-02-23
Payer: COMMERCIAL

## 2024-02-23 ENCOUNTER — TELEPHONE (OUTPATIENT)
Dept: OBSTETRICS AND GYNECOLOGY | Facility: CLINIC | Age: 34
End: 2024-02-23

## 2024-02-23 VITALS
BODY MASS INDEX: 26.25 KG/M2 | WEIGHT: 167.25 LBS | HEIGHT: 67 IN | SYSTOLIC BLOOD PRESSURE: 124 MMHG | DIASTOLIC BLOOD PRESSURE: 92 MMHG

## 2024-02-23 DIAGNOSIS — O36.5921 POOR FETAL GROWTH AFFECTING MANAGEMENT OF MOTHER IN SECOND TRIMESTER, FETUS 1 OF MULTIPLE GESTATION (HHS-HCC): ICD-10-CM

## 2024-02-23 DIAGNOSIS — O14.12: ICD-10-CM

## 2024-02-23 DIAGNOSIS — G89.18 POSTOPERATIVE PAIN: Primary | ICD-10-CM

## 2024-02-23 DIAGNOSIS — O43.199 MARGINAL INSERTION OF UMBILICAL CORD AFFECTING MANAGEMENT OF MOTHER (HHS-HCC): ICD-10-CM

## 2024-02-23 PROBLEM — Z34.01 ENCOUNTER FOR SUPERVISION OF NORMAL FIRST PREGNANCY IN FIRST TRIMESTER (HHS-HCC): Status: RESOLVED | Noted: 2023-10-20 | Resolved: 2024-02-23

## 2024-02-23 PROBLEM — O36.5920 POOR FETAL GROWTH AFFECTING MANAGEMENT OF MOTHER IN SECOND TRIMESTER (HHS-HCC): Status: RESOLVED | Noted: 2024-01-29 | Resolved: 2024-02-23

## 2024-02-23 LAB
LABORATORY COMMENT REPORT: NORMAL
PATH REPORT.FINAL DX SPEC: NORMAL
PATH REPORT.GROSS SPEC: NORMAL
PATH REPORT.RELEVANT HX SPEC: NORMAL
PATH REPORT.TOTAL CANCER: NORMAL

## 2024-02-23 PROCEDURE — 99213 OFFICE O/P EST LOW 20 MIN: CPT | Performed by: OBSTETRICS & GYNECOLOGY

## 2024-02-23 RX ORDER — OXYCODONE HYDROCHLORIDE 5 MG/1
5 TABLET ORAL EVERY 6 HOURS PRN
Qty: 15 TABLET | Refills: 0 | Status: SHIPPED | OUTPATIENT
Start: 2024-02-23 | End: 2024-03-01

## 2024-02-23 ASSESSMENT — EDINBURGH POSTNATAL DEPRESSION SCALE (EPDS)
I HAVE BEEN ABLE TO LAUGH AND SEE THE FUNNY SIDE OF THINGS: AS MUCH AS I ALWAYS COULD
I HAVE BLAMED MYSELF UNNECESSARILY WHEN THINGS WENT WRONG: YES, SOME OF THE TIME
I HAVE BEEN SO UNHAPPY THAT I HAVE HAD DIFFICULTY SLEEPING: NOT AT ALL
I HAVE BEEN SO UNHAPPY THAT I HAVE BEEN CRYING: YES, QUITE OFTEN
I HAVE BEEN ANXIOUS OR WORRIED FOR NO GOOD REASON: HARDLY EVER
TOTAL SCORE: 10
THINGS HAVE BEEN GETTING ON TOP OF ME: YES, SOMETIMES I HAVEN'T BEEN COPING AS WELL AS USUAL
I HAVE LOOKED FORWARD WITH ENJOYMENT TO THINGS: RATHER LESS THAN I USED TO
I HAVE FELT SAD OR MISERABLE: YES, QUITE OFTEN
I HAVE FELT SCARED OR PANICKY FOR NO GOOD REASON: NO, NOT AT ALL
THE THOUGHT OF HARMING MYSELF HAS OCCURRED TO ME: NEVER

## 2024-02-23 NOTE — PROGRESS NOTES
"Patient presents for 1 week postpartum visit   Delivered by section on 2024    Postpartum Visit    HPI:  Pt presents for her 6 week postpartum visit and blood pressure check  s/p classical  section for sPEC, FGR with worsening UAD, breech presentation at 26 wks  on 24.  She had a baby boy.  She is pumping.  On procardia XL 90 mg in AM and 30 mg in PM.  Bps at home mild range.  C/o abdominal pain, mainly on right side.  Tearful today appropriately, baby is stable in NICU currently.     ROS:  Denies the following: Complains of the following:    - episiotomy site pain   - incisional pain  - incisional drainage  - heavy bleeding  - irregular bleeding  - breast pain  - cracked nipples  - vaginal discharge  - shortness of breath  - chest pain  - back pain  - leg pain  - depression  - suicidal ideation  - nausea  - vomiting  - fever  - pain with urination  - tiredness or fatigue  - headache - breastfeeding problems  - abdominal pain       O:  BP (!) 124/92 (BP Location: Left arm, Patient Position: Sitting, BP Cuff Size: Adult)   Ht 1.702 m (5' 7\")   Wt 75.9 kg (167 lb 4 oz)   LMP 2023   Breastfeeding Yes   BMI 26.20 kg/m²     General Appearance   - consistent with stated age, well groomed and cooperative    Integumentary  - skin warm and dry without rash    Head and Neck  - normalocephalic and neck supple    Chest and Lung Exam  - normal breathing effort, no respiratory distress    Breast  - symmetry noted, no mass palpable, no skin change and no nipple discharge.    Abdomen  - soft, appropriately tender, incision healing well without evidence of infection, there is bruising of her right inguinal canal and right labia c/w likely hematoma (this is the location of most of her pain)    Peripheral Vascular  - 2+ LE edema present, equal and bilateral    A/P:   Pt is a 33 y.o.  who presents for incision check  and blood pressure check  after classical  section at [facility] on 24 for " sPEC, FGR with worsening UAD and breech presentation  Doing well overall postpartum considering the circumstances.  Her pain is in the area of a likely resolving hematoma, will refill oxycodone for her and reassurance provided this will resolve over time.  Coping well with NICU course, discussed there will be lots of ups and downs, has good support from her  and family.  Continue procardia xl 90 mg in AM and 30 mg in PM.  Will return in 1 week for close follow up of her bp, incision and mood.    Radha Abad MD

## 2024-02-23 NOTE — DISCHARGE SUMMARY
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 We discussed pregnancy spacing of at least one year, abstaining from intercourse for 6wks, and the ability to become pregnant in the absence of regular menses. Pt verbalized understanding.     Rhogam: The patient's blood type is O POS. The baby's blood type is O POS . Rhogam is not indicated.     Now postpartum day: 7.    Hospital course n/f:    #sPEC  - severe range BP 0916 (see significant event note)  - BP Regimen:   - 30mg nifed -> 60 -> 90mg  - Diagnosed by severe range BP requiring IV tx  - HELLP labs negative over multiple sets, P:C 1.43  - s/p Mg    Difficulty  situational anxiety/stress from sPEC. BP's normotensive to mild range on 90mg nifed. OK for DC using shared decision making. (Baby not doing well in NICU).  PP course otherwise uneventful.  Meeting all postpartum milestones- ambulating independently, passing flatus, tolerating PO intake, lochia light, voiding spontaneously, and pain well controlled with PO meds.     Dispo  OK for DC today  - On discharge, follow up with primary OB in:       - 2-5 days for BP check       - 2 weeks for incision check       - 4-6 week for post-partum visit     Pertinent Physical Exam At Time of  "Discharge  General: well appearing, well nourished, postpartum  Obstetric: fundus firm below umbilicus, lochia light  Skin: Warm, dry; no rashes/lesions/erythema; Pfannenstiel incision: clean, dry, well approximated, open to air, negative discharge/warmth/erythema   Breast: No masses, nipple discharge  Neuro: A/Ox3, conversational, no gross motor deficit   GI: no distension, appropriately tender, soft, +BS  Respiratory: Even and unlabored on RA, LSCTA BL  Cardiovascular: Trace BLE edema; No erythema, warmth  Psych: appropriate mood and affect       Your medication list        START taking these medications        Instructions Last Dose Given Next Dose Due   acetaminophen 500 mg tablet  Commonly known as: Tylenol      Take 2 tablets (1,000 mg) by mouth every 6 hours if needed for moderate pain (4 - 6).       docusate sodium 100 mg capsule  Commonly known as: Colace      Take 1 capsule (100 mg) by mouth 2 times a day as needed for constipation.       ibuprofen 600 mg tablet      Take 1 tablet (600 mg) by mouth every 6 hours if needed for moderate pain (4 - 6) (pain).       NIFEdipine ER 30 mg 24 hr tablet  Commonly known as: Adalat CC      Take 1 tablet (30 mg) by mouth once daily at bedtime. Do not crush, chew, or split.       NIFEdipine ER 90 mg 24 hr tablet  Commonly known as: Adalat CC  Start taking on: February 22, 2024      Take 1 tablet (90 mg) by mouth once daily in the morning. Take before meals. Do not crush, chew, or split. Do not start before February 22, 2024.       oxyCODONE 5 mg immediate release tablet  Commonly known as: Roxicodone      Take 1 tablet (5 mg) by mouth every 6 hours if needed for severe pain (7 - 10).              CONTINUE taking these medications        Instructions Last Dose Given Next Dose Due   Prenatal Vitamin 27 mg iron- 800 mcg tablet  Generic drug: prenatal vit no.124-iron-folic           Tegaderm Frame Style 2 3/8 X 2 3/4 \" bandage  Generic drug: transparent dressings      USE " AS DIRECTED WITH LIDOCAINE CREAM              STOP taking these medications      lidocaine-prilocaine 2.5-2.5 % cream  Commonly known as: Emla        Pregnyl 10,000 unit injection  Generic drug: chorionic gonadotropin        progesterone 50 mg/mL injection               ASK your doctor about these medications        Instructions Last Dose Given Next Dose Due   BD Luer-Sharona Syringe 3 mL syringe  Generic drug: syringe (disposable)  Ask about: Should I take this medication?      USED TO ADMINISTER PROGESTERONE IN OIL                 Where to Get Your Medications        These medications were sent to [facility]  [address], [address], [facility] [address]      Hours: 8AM to 6PM Mon-Fri, 8AM to 4PM Sat, 9AM to 1PM Sun Phone: [phone]   acetaminophen 500 mg tablet  docusate sodium 100 mg capsule  ibuprofen 600 mg tablet  NIFEdipine ER 30 mg 24 hr tablet  NIFEdipine ER 90 mg 24 hr tablet  oxyCODONE 5 mg immediate release tablet           Outpatient Follow-Up  Future Appointments   Date Time Provider Department Center   2/28/2024  8:30 AM Radha Abad MD [facility]       EMILIANA Frye-CNP

## 2024-02-26 ENCOUNTER — PATIENT OUTREACH (OUTPATIENT)
Dept: CARE COORDINATION | Facility: CLINIC | Age: 34
End: 2024-02-26
Payer: COMMERCIAL

## 2024-02-26 NOTE — PROGRESS NOTES
Spoke w pt post hospital DC.  Currently spending time w baby boy in NICU.  Had a PP follow up visit/BP check on 2.23.  Meds reviewed and confirmed adherence.  Pain is manageable and using analgesics judiciously.  Has good support w family and Emelyn's parents are able to provide transportation for daily NICU visits.  Appraised of ACO services/resources that are available to her.  Encouraged to call for needs that may arise prior to next planned outreach call (2 and 4 weeks post-hospital DC); agreeable.  Continue to follow.

## 2024-02-27 ENCOUNTER — APPOINTMENT (OUTPATIENT)
Dept: RADIOLOGY | Facility: CLINIC | Age: 34
End: 2024-02-27
Payer: COMMERCIAL

## 2024-02-27 ENCOUNTER — LACTATION ENCOUNTER (OUTPATIENT)
Dept: OTHER | Facility: HOSPITAL | Age: 34
End: 2024-02-27

## 2024-02-27 NOTE — LACTATION NOTE
This note was copied from a baby's chart.  Lactation Consultant Note  Lactation Consultation   Neema Castañeda RN IBCLC    Recommendations/Summary        I spoke with mom at pt's bedside.  She is now getting 15 ml with her pumping efforts. I discussed with mom tips to increase her milk supply.  She is going to hold the baby today in [place].  I will give her an information sheet on Foods to help increase milk supply as well as the contact information for breastfeeding medicine.   Mom was invited to follow up with LC services as needed.

## 2024-02-27 NOTE — PROGRESS NOTES
"Patient presents for 1 week postpartum visit   Delivered by section on 2024    Postpartum Visit    HPI:  Pt presents for her incision check and bp check s/p classical  section for sPEC, FGR with worsening UAD, breech presentation at 26 wks  on 24.  She had a baby boy.  She is pumping.  On procardia XL 90 mg in AM and 30 mg in PM.  Bps at home mild range, normal here today.  Abdominal pain on right side is improving.  Baby is stable in NICU currently.     ROS:  Denies the following: Complains of the following:    - episiotomy site pain   - incisional pain  - incisional drainage  - heavy bleeding  - irregular bleeding  - breast pain  - cracked nipples  - vaginal discharge  - shortness of breath  - chest pain  - back pain  - leg pain  - depression  - suicidal ideation  - nausea  - vomiting  - fever  - pain with urination  - tiredness or fatigue  - headache - breastfeeding problems  - abdominal pain       O:  /82 (BP Location: Left arm, Patient Position: Sitting, BP Cuff Size: Adult)   Ht 1.702 m (5' 7\")   Wt 73.5 kg (162 lb 2 oz)   Breastfeeding Yes   BMI 25.39 kg/m²     General Appearance   - consistent with stated age, well groomed and cooperative    Integumentary  - skin warm and dry without rash    Head and Neck  - normalocephalic and neck supple    Chest and Lung Exam  - normal breathing effort, no respiratory distress    Breast  - symmetry noted, no mass palpable, no skin change and no nipple discharge.    Abdomen  - soft, appropriately tender, incision healing well without evidence of infection, bruising in right inguinal canal and labia much improved since last visit    Peripheral Vascular  - 1+ LE edema present, equal and bilateral    A/P:   Pt is a 33 y.o.  who presents for incision check  and blood pressure check  after classical  section at [facility] on 24 for sPEC, FGR with worsening UAD and breech presentation  Doing well overall postpartum considering the " circumstances. Her EPDS is 15, declines meds for now, feels it is all situational due to NICU course.  Overall coping well with NICU course, discussed there will be lots of ups and downs, has good support from her  and family.  Incision healing well and hematoma/ecchymosis much improved. Continue procardia xl 90 mg in AM and 30 mg in PM.  Will return in 2 weeks for close follow up of her bp, incision and mood.    Radha Abad MD

## 2024-02-28 ENCOUNTER — POSTPARTUM VISIT (OUTPATIENT)
Dept: OBSTETRICS AND GYNECOLOGY | Facility: CLINIC | Age: 34
End: 2024-02-28
Payer: COMMERCIAL

## 2024-02-28 VITALS
WEIGHT: 162.13 LBS | DIASTOLIC BLOOD PRESSURE: 82 MMHG | BODY MASS INDEX: 25.45 KG/M2 | SYSTOLIC BLOOD PRESSURE: 118 MMHG | HEIGHT: 67 IN

## 2024-02-28 PROCEDURE — 0503F POSTPARTUM CARE VISIT: CPT | Performed by: OBSTETRICS & GYNECOLOGY

## 2024-02-28 ASSESSMENT — EDINBURGH POSTNATAL DEPRESSION SCALE (EPDS)
I HAVE LOOKED FORWARD WITH ENJOYMENT TO THINGS: RATHER LESS THAN I USED TO
I HAVE FELT SAD OR MISERABLE: YES, QUITE OFTEN
THINGS HAVE BEEN GETTING ON TOP OF ME: YES, SOMETIMES I HAVEN'T BEEN COPING AS WELL AS USUAL
I HAVE BEEN ANXIOUS OR WORRIED FOR NO GOOD REASON: YES, SOMETIMES
I HAVE BEEN SO UNHAPPY THAT I HAVE HAD DIFFICULTY SLEEPING: YES, SOMETIMES
THE THOUGHT OF HARMING MYSELF HAS OCCURRED TO ME: NEVER
I HAVE FELT SCARED OR PANICKY FOR NO GOOD REASON: YES, SOMETIMES
I HAVE BEEN SO UNHAPPY THAT I HAVE BEEN CRYING: YES, QUITE OFTEN
TOTAL SCORE: 15
I HAVE BEEN ABLE TO LAUGH AND SEE THE FUNNY SIDE OF THINGS: AS MUCH AS I ALWAYS COULD
I HAVE BLAMED MYSELF UNNECESSARILY WHEN THINGS WENT WRONG: YES, SOME OF THE TIME

## 2024-03-01 NOTE — DOCUMENTATION CLARIFICATION NOTE
"    PATIENT:               TANIA GOOD  ACCT #:                  [id number]  MRN:                       [id number]  :                       1990  ADMIT DATE:       2024 1:15 PM  DISCH DATE:        2024 4:53 PM  RESPONDING PROVIDER #:        [id number]          PROVIDER RESPONSE TEXT:    Patient has acute blood loss anemia that is clinically significant and required treatment/monitoring    CDI QUERY TEXT:    [facility]_Abnormal Studies      Instruction:    Based on your assessment of the patient and the clinical information, please provide the requested documentation by clicking on the appropriate radio button and enter any additional information if prompted.    Question: Is there a diagnosis indicative of the lab values or image study    When answering this query, please exercise your independent professional judgment. The fact that a question is being asked, does not imply that any particular answer is desired or expected.    The patient's clinical indicators include:  Clinical Information:    Postpartum Progress Note 2024    \" 33 y.o., , who delivered at 26w5d gestation and is now postpartum day 1 from classical primary CS. \"    Clinical Indicators: L and D Note EBL 1159ml      HEMOGLOBIN     11.0                 11.9              13.3    HEMATOCRIT       33.5                 34.2              39.5      Treatment: Monitor, prenatal vitamin 27 mg iron - 800 mcg    Risk Factors:  section delivery, uterine atony, PPH  Options provided:  -- Patient has acute blood loss anemia that is clinically significant and required treatment/monitoring  -- Patient has acute blood loss anemia not requiring treatment or evaluation  -- Other - I will add my own diagnosis  -- Refer to Clinical Documentation Reviewer    Query created by: Melissa Stewart on 2024 2:23 PM      Electronically signed by:  ELSA BARNES MD 3/1/2024 12:33 PM          "

## 2024-03-04 ENCOUNTER — LACTATION ENCOUNTER (OUTPATIENT)
Dept: OTHER | Facility: HOSPITAL | Age: 34
End: 2024-03-04

## 2024-03-04 NOTE — LACTATION NOTE
This note was copied from a baby's chart.  Lactation Consultant Note  Lactation Consultation   Neema Castañeda, RN IBCLC    Maternal Information   Mom with lower than expected milk supply.  She remains on her high does blood pressure medication and the baby has had a rough NICU course.    Recommendations/Summary       I spoke with mom at pt's bedside at her request to follow up with her on her milk supply concerns.  Mom reports that she continues to pump frequently but cannot get her supply to increase.  I discussed with mom the things she is experiencing may be contributing to her lower supply.  She is under a great deal of stress with this tiny  infant and she remains on high does medication to keep her blood pressure under control.  I had given her Breastfeeding Medicine contact information last week and we discussed what they could offer her that LC services at [facility] cannot.  Mom would like to keep working at pumping for now.  She thinks her Doctor is going to decrease her blood pressure medication this week.  This may help her make more milk. She will continue to reach out to LC services as needed.Notified of [facility] Lactation Support group meeting and provided written invitation.

## 2024-03-05 ENCOUNTER — APPOINTMENT (OUTPATIENT)
Dept: OBSTETRICS AND GYNECOLOGY | Facility: CLINIC | Age: 34
End: 2024-03-05
Payer: COMMERCIAL

## 2024-03-08 ENCOUNTER — LACTATION ENCOUNTER (OUTPATIENT)
Dept: OTHER | Facility: HOSPITAL | Age: 34
End: 2024-03-08

## 2024-03-08 NOTE — LACTATION NOTE
This note was copied from a baby's chart.  Lactation Consultant Note  Lactation Consultation   Neema Castañeda RN IBCLC    Recommendations/Summary       I was called to bedside to assist mom with her pump as it was not providing suction to one side.  I adjusted her value apparatus on this side and it resumed functioning.  I instructed mom to discard these values after this pumping and I gave her new as ones as this is the second time she has had this problem in the past week.  Mom remains concerned that her supply has not increased. I gave her the contact information for Breast Feeding Medicine and  encouraged her to reach out to them.  She is pumping enough working but remains stuck at there current level of 10 to 12 ml per pumping.  Invited to contact LC services as needed.

## 2024-03-11 ENCOUNTER — POSTPARTUM VISIT (OUTPATIENT)
Dept: OBSTETRICS AND GYNECOLOGY | Facility: CLINIC | Age: 34
End: 2024-03-11
Payer: COMMERCIAL

## 2024-03-11 VITALS
SYSTOLIC BLOOD PRESSURE: 112 MMHG | WEIGHT: 163 LBS | BODY MASS INDEX: 25.58 KG/M2 | DIASTOLIC BLOOD PRESSURE: 82 MMHG | HEIGHT: 67 IN

## 2024-03-11 PROCEDURE — 0503F POSTPARTUM CARE VISIT: CPT | Performed by: OBSTETRICS & GYNECOLOGY

## 2024-03-11 ASSESSMENT — EDINBURGH POSTNATAL DEPRESSION SCALE (EPDS)
I HAVE BLAMED MYSELF UNNECESSARILY WHEN THINGS WENT WRONG: YES, SOME OF THE TIME
TOTAL SCORE: 16
THINGS HAVE BEEN GETTING ON TOP OF ME: YES, SOMETIMES I HAVEN'T BEEN COPING AS WELL AS USUAL
THE THOUGHT OF HARMING MYSELF HAS OCCURRED TO ME: NEVER
I HAVE LOOKED FORWARD WITH ENJOYMENT TO THINGS: RATHER LESS THAN I USED TO
I HAVE BEEN SO UNHAPPY THAT I HAVE BEEN CRYING: YES, QUITE OFTEN
I HAVE BEEN ABLE TO LAUGH AND SEE THE FUNNY SIDE OF THINGS: NOT QUITE SO MUCH NOW
I HAVE FELT SAD OR MISERABLE: YES, QUITE OFTEN
I HAVE FELT SCARED OR PANICKY FOR NO GOOD REASON: YES, SOMETIMES
I HAVE BEEN SO UNHAPPY THAT I HAVE HAD DIFFICULTY SLEEPING: YES, SOMETIMES
I HAVE BEEN ANXIOUS OR WORRIED FOR NO GOOD REASON: YES, SOMETIMES

## 2024-03-11 NOTE — PROGRESS NOTES
"Patient presents for postpartum visit   Patient is 3 weeks postpartum  Delivered  by section at 26 weeks and 5 days      Patient presents for 3 week postpartum visit   Delivered by section on 2024    Postpartum Visit    HPI:  Pt presents for her incision check and bp check s/p classical  section for sPEC, FGR with worsening UAD, breech presentation at 26 wks  on 24.  She had a baby boy.  She is pumping.  On procardia XL 90 mg in AM and 30 mg in PM still. Bps at home mostly normal.  Baby is stable in NICU currently.     ROS:  Denies the following: Complains of the following:    - episiotomy site pain   - incisional pain  - incisional drainage  - heavy bleeding  - irregular bleeding  - breast pain  - cracked nipples  - vaginal discharge  - shortness of breath  - chest pain  - back pain  - leg pain  - depression  - suicidal ideation  - nausea  - vomiting  - fever  - pain with urination  - tiredness or fatigue  - headache - breastfeeding problems  - abdominal pain       O:  /82 (BP Location: Left arm, Patient Position: Sitting, BP Cuff Size: Adult)   Ht 1.702 m (5' 7\")   Wt 73.9 kg (163 lb)   Breastfeeding Yes   BMI 25.53 kg/m²     General Appearance   - consistent with stated age, well groomed and cooperative    Integumentary  - skin warm and dry without rash    Head and Neck  - normalocephalic and neck supple    Chest and Lung Exam  - normal breathing effort, no respiratory distress    Breast  - symmetry noted, no mass palpable, no skin change and no nipple discharge.    Abdomen  - soft, appropriately tender, incision healing well without evidence of infection    Peripheral Vascular  - 1+ LE edema present, equal and bilateral    A/P:   Pt is a 33 y.o.  who presents for incision check  and blood pressure check  after classical  section at [facility] on 24 for sPEC, FGR with worsening UAD and breech presentation  Doing well overall postpartum considering the circumstances. " Her EPDS is 16, stable, declines meds for now, feels it is all situational due to NICU course.  Overall coping well with NICU course.  Incision healing well. Continue procardia xl 90 mg in AM and will stop the 30 mg in the PM.  Will return in 3 weeks for 6 wk postpartum visit or earlier with any concerns.    Radha Abad MD

## 2024-03-14 ENCOUNTER — PATIENT OUTREACH (OUTPATIENT)
Dept: CARE COORDINATION | Facility: CLINIC | Age: 34
End: 2024-03-14
Payer: COMMERCIAL

## 2024-03-14 NOTE — PROGRESS NOTES
Brief chart review conducted prior to outreach call.  Spoke w Emelyn today.  Reports things are going relatively well; baby remains in NICU.  She has had PP visits w MD and also working w lactation team.  Denies issues or concerns at this time.  Will follow up again in 2 weeks and encouraged to call for needs in the interim; agreeable.

## 2024-03-19 ENCOUNTER — APPOINTMENT (OUTPATIENT)
Dept: OBSTETRICS AND GYNECOLOGY | Facility: CLINIC | Age: 34
End: 2024-03-19
Payer: COMMERCIAL

## 2024-03-21 ENCOUNTER — LACTATION ENCOUNTER (OUTPATIENT)
Dept: OTHER | Facility: HOSPITAL | Age: 34
End: 2024-03-21

## 2024-03-21 ENCOUNTER — PHARMACY VISIT (OUTPATIENT)
Dept: PHARMACY | Facility: CLINIC | Age: 34
End: 2024-03-21
Payer: COMMERCIAL

## 2024-03-21 PROCEDURE — RXMED WILLOW AMBULATORY MEDICATION CHARGE

## 2024-03-21 NOTE — LACTATION NOTE
This note was copied from a baby's chart.  Lactation Consultant Note  Lactation Consultation   Neema Castañeda RN IBCLC    Recommendations/Summary       I spoke with mom at pt's bedside.  Mom continues to pump but is still not producing the expected volume of milk.  She spoke with her MD who reminded her of how difficult it is to make milk under the conditions of having a premature baby in the NICU who has never gotten to the breast to help drive her supply.  Mom reports that she does not wish to pursue taking supplements  to assist with her milk supply.  I discussed with her that she has given it her all and sometimes it does not work as well as we would like.  She did not report to stopping pumping all together but she currently is not going to stress herself out over it. I provided validation to mom for all her efforts as well as giving her an out for pumping should she choose this path. Invited to contact  services as needed.

## 2024-03-28 ENCOUNTER — PATIENT OUTREACH (OUTPATIENT)
Dept: CARE COORDINATION | Facility: CLINIC | Age: 34
End: 2024-03-28
Payer: COMMERCIAL

## 2024-03-28 NOTE — PROGRESS NOTES
Outreach call to Emelyn completed today.  Baby remains in NICU and there was some instability this past weekend but he is doing better this week.  She has been checking BP only sporadically due to these recent events but encouraged to resume her monitoring of BP.  Confirmed remains on antihypertensives.  Has BH appt on 4.3 and PP appt on 4.10.  Family remains supportive of her and baby.  Encouraged to call for any needs in the future; agreeable.

## 2024-04-03 ENCOUNTER — OFFICE VISIT (OUTPATIENT)
Dept: BEHAVIORAL HEALTH | Facility: CLINIC | Age: 34
End: 2024-04-03
Payer: COMMERCIAL

## 2024-04-03 DIAGNOSIS — F43.23 ADJUSTMENT DISORDER WITH MIXED ANXIETY AND DEPRESSED MOOD: ICD-10-CM

## 2024-04-03 PROCEDURE — 99205 OFFICE O/P NEW HI 60 MIN: CPT | Performed by: CLINICAL NURSE SPECIALIST

## 2024-04-03 RX ORDER — SERTRALINE HYDROCHLORIDE 50 MG/1
50 TABLET, FILM COATED ORAL DAILY
Qty: 30 TABLET | Refills: 2 | Status: SHIPPED | OUTPATIENT
Start: 2024-04-03 | End: 2024-04-11 | Stop reason: SDUPTHER

## 2024-04-03 NOTE — PROGRESS NOTES
Subjective   Patient ID: Tania Perez is a 33 y.o. female MF  who is accompanied by her  and presents for an evaluation.  Referred by OB. 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       History of Present Illness:  TANIA presents at 2 month postpartum after delivering a baby boy, Jesús, on 24 via urgent CSX. She was admitted for 3 weeks due to Severe PEC. Baby was born at 26wga weighing 2 lbs from a planned IVF pregnancy. His EDC was 2024 and he is currently in the NICU. Pt goes and spend day in NICU every day. Has  and family support.   Baby's status is reportedly tenuous, with pulmonary, GI complications.    Endorsing mood most of the time sad, angry at herself, high self- blame for baby status, thinking she could have dome more, crying frequently, mood varies according to baby's health status,   Sleeping for about  6 hours, and goes home to sleep.  is optimistic, describes himself as 'even keeled.'  Appetite is WNL, attempting to pump .  Reports its hard to see other babies, hard to talk about, and finds herself  avoiding some social contact and situation so as to  not have to answer questions. Has sought out contact with other mothers in NICU.      Was supposed to RTW 24 but is concerned for her ability to function and uncertain as how she will managed when baby comes home ,which is scheduled for mid May 2024.      Review of Systems:  Anxiety-Denies panic, no OCD sx    Depression-EPNDS = 22, GADS = 4, PhQ 9 = 5 MDQ = 2   PTSD- FB, nightmares Denies  ACES = 0   Psychosis-Denies    Nguyen- Denies  MQ@ = 2 an Negative   Substance Use-ETOH, Cannabis, CBD     HPI  Review of Systems   All other systems reviewed and are negative.    Objective   Physical Exam  Psychiatric:         Attention and Perception: Attention and perception normal.         Mood and Affect: Mood is anxious and depressed. Affect is tearful.         Speech: Speech normal.         Behavior:  Behavior is cooperative.         Thought Content: Thought content normal.         Cognition and Memory: Cognition and memory normal.         Judgment: Judgment normal.     OB/GYN History:  Pregnancy # 1 Experienced (2 ) ' Chemical pregnancies'  at 4wga     Past Psychiatric History:  Out Patient Treatment None   In Patient Admission : none   Si Hx denies past ideation, gestures, attempts    Past  Medications Trials None -    Past Medical History:  Diagnoses - HTN   Current Medications- Nifedipine    ALLERGIES  NKDA     Family History:  Maternal : Mother takes something Aunt may be BPAD 'bad for 4 months at a time   Paternal :   none   Family of Origin: Reports no completed suicides      Psychosocial:  [place]  Parent   together,   Patient is youngest of older sister and step other   Education AA Degree   Employed by  5 yr  Legal Issues None                       Assessment/Plan     IMP: 34 yo MF  who is accompanied by her  and presents for an evaluation.   Employee . No prior mental health history. Two months postpartum after delivering baby boyJesús, 24 via urgent CSX, at  26wga weighing 2 lbs per IVF pregnancy. His EDC was 2024 and he is currently in the NICU. Pt goes and spend day in NICU. Mood is labile, depressed, sad, tearful, high self blame, no safety issues. Pt reports she is expected to RTW winth next week, She endorses feeling preoccupied, hard to focus on anything other than her son. Her mood is dependent on his daily medical condition. ,  complete She has her phone nearby and explains she has to be able to respond to any NICU call. While baby is due to come home mid May, she is unaware as to how she will manage.     Low Risk for Harm due to no medication, ill child with high self blame, Protected by marriage, employment, family support , hel seeking     Reviewed s/sx, risk, course and treatment for PMADS. Reviewed medication with established safety for lactation given  pt is pumping and supplementing w/ formula. Discussed r/b/a for use of Sertraline given 1% of drug passes to breast milk,     Discussed completing FMLA  for time off  for care of baby.     Diagnoses :   Adjustment Disorder w/ Depressed and Anxious Mood   Two months postpartum      Treatment Planning:  Begin trial of Sertraline 50 mg take 1/2 Tab for 5-7 days then increase to 1 tab po qam with food    Refer to Adult Psychology for assist w/ coping   RTC in one to two  weeks   Refer to NICU MOMS Support Group

## 2024-04-05 ENCOUNTER — APPOINTMENT (OUTPATIENT)
Dept: OBSTETRICS AND GYNECOLOGY | Facility: CLINIC | Age: 34
End: 2024-04-05
Payer: COMMERCIAL

## 2024-04-09 NOTE — PROGRESS NOTES
"Patient presents for 7 week postpartum visit   Delivered by section on 2024  Had PAP and Colpo last year     BRYANNA Harrison    Postpartum Visit    HPI:  Pt presents for  7 wk postpartum visit s/p classical  section for sPEC, FGR with worsening UAD, breech presentation at 26 wks  on 24.  She had a baby boy.  She is pumping.  Still taking procardia XL 90 mg in AM and 30 mg in PM still. Bps at home mostly normal.  Baby is mostly stable in NICU, currently intubated for a collapsed right lung. Saw Viviane Sharp, has follow up with her tomorrow, has not yet started zoloft.     ROS:  Denies the following: Complains of the following:    - episiotomy site pain   - incisional pain  - incisional drainage  - heavy bleeding  - irregular bleeding  - breast pain  - cracked nipples  - vaginal discharge  - shortness of breath  - chest pain  - back pain  - leg pain  - depression  - suicidal ideation  - nausea  - vomiting  - fever  - pain with urination  - tiredness or fatigue  - headache - none       O:  /82 (BP Location: Right arm, Patient Position: Sitting, BP Cuff Size: Adult)   Ht 1.702 m (5' 7\")   Wt 73.1 kg (161 lb 4 oz)   Breastfeeding Yes   BMI 25.26 kg/m²     General Appearance   - consistent with stated age, well groomed and cooperative    Integumentary  - skin warm and dry without rash    Head and Neck  - normalocephalic and neck supple    Chest and Lung Exam  - normal breathing effort, no respiratory distress    Abdomen  - soft, nontender, incision well healed    Peripheral Vascular  - 1+ LE edema present, equal and bilateral    A/P:   Pt is a 33 y.o.  who presents for 7 wk postpartum visit after classical  section at [facility] on 24 for sPEC, FGR with worsening UAD and breech presentation  Doing well overall postpartum considering the circumstances. Her EPDS is 15, stable from prior, saw Viviane Sharp, starting zoloft.  Incision well healed, discussed no restrictions at " this point.  Declines birth control for now, discussed pumping is not a good form of birth control, she will let me know if she wants something. 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  Continue procardia xl 90 mg in AM and will stop the 30 mg in the PM, transition her bp management to PCP.  Due for repeat pap 11/2024, will return then for her annual exam or earlier with any concerns.     Radha Abad MD

## 2024-04-10 ENCOUNTER — POSTPARTUM VISIT (OUTPATIENT)
Dept: OBSTETRICS AND GYNECOLOGY | Facility: CLINIC | Age: 34
End: 2024-04-10
Payer: COMMERCIAL

## 2024-04-10 VITALS
DIASTOLIC BLOOD PRESSURE: 82 MMHG | HEIGHT: 67 IN | BODY MASS INDEX: 25.31 KG/M2 | SYSTOLIC BLOOD PRESSURE: 122 MMHG | WEIGHT: 161.25 LBS

## 2024-04-10 DIAGNOSIS — Z87.59 HISTORY OF SEVERE PRE-ECLAMPSIA: Primary | ICD-10-CM

## 2024-04-10 PROCEDURE — 0503F POSTPARTUM CARE VISIT: CPT | Performed by: OBSTETRICS & GYNECOLOGY

## 2024-04-10 ASSESSMENT — EDINBURGH POSTNATAL DEPRESSION SCALE (EPDS)
I HAVE BEEN SO UNHAPPY THAT I HAVE BEEN CRYING: YES, QUITE OFTEN
THE THOUGHT OF HARMING MYSELF HAS OCCURRED TO ME: NEVER
TOTAL SCORE: 15
I HAVE LOOKED FORWARD WITH ENJOYMENT TO THINGS: RATHER LESS THAN I USED TO
I HAVE BEEN ANXIOUS OR WORRIED FOR NO GOOD REASON: YES, SOMETIMES
I HAVE FELT SCARED OR PANICKY FOR NO GOOD REASON: YES, SOMETIMES
I HAVE BEEN ABLE TO LAUGH AND SEE THE FUNNY SIDE OF THINGS: NOT QUITE SO MUCH NOW
I HAVE BEEN SO UNHAPPY THAT I HAVE HAD DIFFICULTY SLEEPING: NOT VERY OFTEN
I HAVE FELT SAD OR MISERABLE: YES, QUITE OFTEN
I HAVE BLAMED MYSELF UNNECESSARILY WHEN THINGS WENT WRONG: YES, SOME OF THE TIME
THINGS HAVE BEEN GETTING ON TOP OF ME: YES, SOMETIMES I HAVEN'T BEEN COPING AS WELL AS USUAL

## 2024-04-11 ENCOUNTER — TELEMEDICINE (OUTPATIENT)
Dept: BEHAVIORAL HEALTH | Facility: CLINIC | Age: 34
End: 2024-04-11
Payer: COMMERCIAL

## 2024-04-11 DIAGNOSIS — F43.23 ADJUSTMENT DISORDER WITH MIXED ANXIETY AND DEPRESSED MOOD: ICD-10-CM

## 2024-04-11 PROCEDURE — 99215 OFFICE O/P EST HI 40 MIN: CPT | Performed by: CLINICAL NURSE SPECIALIST

## 2024-04-11 RX ORDER — SERTRALINE HYDROCHLORIDE 50 MG/1
50 TABLET, FILM COATED ORAL DAILY
Qty: 30 TABLET | Refills: 2 | Status: SHIPPED | OUTPATIENT
Start: 2024-04-11 | End: 2024-07-10

## 2024-04-11 NOTE — PROGRESS NOTES
Subjective   Patient ID: Emelyn Perez is a 33 y.o. female who presents for follow up visit.    HPI  Review of Systems   All other systems reviewed and are negative.     Psych Review of Symptoms  Objective   Physical Exam  Psychiatric:         Attention and Perception: Attention and perception normal.         Mood and Affect: Mood is anxious. Affect is labile and tearful.         Speech: Speech normal.         Behavior: Behavior normal. Behavior is cooperative.         Thought Content: Thought content normal.         Cognition and Memory: Cognition and memory normal.         Judgment: Judgment normal.     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 Two months postpartum after delivering baby boy, Jesús, 24 via urgent CSX, at  26wga weighing 2 lbs per IVF pregnancy. His EDC was 2024 and he is currently in the NICU. Pt goes and spend day in NICU. Low Risk for Harm due to no medication, ill child with high self blame, Protected by marriage, employment, family support, help seeking     One week interim pt reports mood continues labile, depressed, sad, less tearful, no safety issues. T She started the Sertraline and tolerating dose for now. [facility] paperwork for an extended leave was completed. Awaiting ind therapy appt. Declines interest in   IOP as too overwhelmed to hear others ' experiences. Able to make some connection w. Ind mother in NICU or seek and receive support. She is still quite preoccupied with her baby and finds it hard to focus on anything else. Her mood is dependent on his daily medical condition. ,e She has her phone nearby and explains she has to be able to respond to any NICU call.  Spent session reviewing strategies which soothe and support her ie running, walking dog, taking warm shower, etc.      Diagnoses :   Adjustment Disorder w/ Depressed and Anxious Mood   Two months postpartum      Treatment Planning:  Cont trial of Sertraline 50  mg take 1 tab po qam with food    Awaiting appt for Adult Psychology for assist w/ coping   RTC in one to two  weeks   Refer to NICU MOMS Support Group online via [employer]   Refer to [facility] NICU Support Groups   Contact provider as needed via [employer]

## 2024-04-19 ENCOUNTER — APPOINTMENT (OUTPATIENT)
Dept: OBSTETRICS AND GYNECOLOGY | Facility: CLINIC | Age: 34
End: 2024-04-19
Payer: COMMERCIAL

## 2024-04-23 ENCOUNTER — TELEMEDICINE (OUTPATIENT)
Dept: BEHAVIORAL HEALTH | Facility: HOSPITAL | Age: 34
End: 2024-04-23
Payer: COMMERCIAL

## 2024-04-23 DIAGNOSIS — F43.23 ADJUSTMENT DISORDER WITH MIXED ANXIETY AND DEPRESSED MOOD: ICD-10-CM

## 2024-04-23 PROCEDURE — 99214 OFFICE O/P EST MOD 30 MIN: CPT | Performed by: CLINICAL NURSE SPECIALIST

## 2024-04-23 NOTE — PROGRESS NOTES
Subjective   Patient ID: Emelyn Perez is a 33 y.o. female who presents for Adjustment Disorder w/ Depression and Anxiety.   HPI  Review of Systems   All other systems reviewed and are negative.   Psych Review of Symptoms  Objective   Physical Exam  Psychiatric:         Attention and Perception: Attention and perception normal.         Mood and Affect: Mood and affect normal.         Speech: Speech normal.         Behavior: Behavior normal. Behavior is cooperative.         Thought Content: Thought content normal.         Cognition and Memory: Cognition and memory normal.         Judgment: Judgment normal.     INTERIM: Baby is now 36.2wga and since delivery in NICU for 67 days. MOOD: Feeling a little better ie getting out more socializing  more with family, talking more with family members, taking walks, enjoying weather, able to enjoy some things. Reports she is cooking more, always loved cooking. Seeking contact with friends. Depression: Self rates as # 1/10, still cry on occasion, ie seeing other babies, etc. Anxiety : Self rates #4/10. Baby was extubated, was informed that he will not be home by his due date, more like July. Sleeping adequate. Overall baby improving. Taking the Sertraline 50 mg 1/2 tab x 8 days and states it takes edge off, denies SES. Baby is being fed by pumped breast milk and formula. Pt reports she has informed his providers of the fact she is taking Sertraline.        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 Two months postpartum after delivering baby Jesús guerra, 24 via urgent CSX, at  26wga weighing 2 lbs per IVF pregnancy. His EDC was 2024 and he is currently in the NICU. Pt goes and spend day in NICU. Low Risk for Harm due to  ill child with high self blame, Protected by marriage, employment, family support, help seeking. Discussed plan for coming week, ie socializing w/ friend and walking three times per week. Reports her ST  Disability was accepted through May 28,2024 for [employer]. Parents and sister come and stay with pt at NICU.      Diagnoses :   Adjustment Disorder w/ Depressed and Anxious Mood   Two months postpartum      Treatment Planning:  Cont trial of Sertraline 50 mg take 1 tab po qam with food    Awaiting appt for Adult Psychology for assist w/ coping   RTC in one to two  weeks   Refer to NICU MOMS Support Group online via [employer]   Refer to [facility] NICU Support Groups   Contact provider as needed via [employer]

## 2024-04-26 ENCOUNTER — APPOINTMENT (OUTPATIENT)
Dept: OBSTETRICS AND GYNECOLOGY | Facility: CLINIC | Age: 34
End: 2024-04-26
Payer: COMMERCIAL

## 2024-04-30 ENCOUNTER — TELEMEDICINE (OUTPATIENT)
Dept: BEHAVIORAL HEALTH | Facility: HOSPITAL | Age: 34
End: 2024-04-30
Payer: COMMERCIAL

## 2024-04-30 DIAGNOSIS — F43.23 ADJUSTMENT DISORDER WITH MIXED ANXIETY AND DEPRESSED MOOD: ICD-10-CM

## 2024-04-30 PROCEDURE — 99213 OFFICE O/P EST LOW 20 MIN: CPT | Performed by: CLINICAL NURSE SPECIALIST

## 2024-04-30 NOTE — PROGRESS NOTES
Subjective   Patient ID: Emelyn Perez is a 33 y.o. female MF  presents for Adjustment Disorder with Depression and Anxiety.  Employee . Two months postpartum after delivering baby Jesús guerra, 24 via urgent CSX, at  26wga weighing 2 lbs per IVF pregnancy. His EDC was 2024 and he is currently in the NICU. Pt goes and spend day in NICU. Low Risk for Harm due to  ill child with high self blame, Protected by marriage, employment, family support, help seeking.  HPI  Review of Systems   Psych Review of Symptoms  Objective   Physical Exam  INTERIM: Baby has been overall making progress ie extubated, gaining weight and now at 37.2wga. Having eye issues w/ possible injections/procedure causing pt increased distress. Mood this week is emotional, labile, tearful tolerating medication, no SES. Sleep varied, has support of , Janee depends on welfare of baby, and usually low. Pt is making efforts to go out with her family,taking walks. Taking Sertraline 50 mg 1/2 tab po qam for 25 mg and feels sufficient as is enough to take edge off.  Working with a NICU SW.     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  No prior mental health history. Three months postpartum after delivering baby Jesús guerra, 24 via urgent CSX, at 37.1 wga weighing 4 lbs per IVF pregnancy. His EDC was 2024 and he is currently in the NICU. Pt spends day in NICU. Low Risk for Harm due to ill child with high self blame, Protected by marriage, employment, family support, help seeking. Discussed plan for coping ie socializing w/ friend and walking three times per week. Reports her ST Disability was accepted through May 28, 2024 for [employer]. Parents and sister come and stay with pt at NICU. Tolerating Sertraline 50 mg 1/2 tab po qam , declines increase for now as feels it is is sufficient to take the edge off.       Diagnoses :   Adjustment Disorder w/ Depressed and Anxious Mood   Two 1/2 months postpartum      Treatment  Planning:  Cont Sertraline 50 mg take 1/2 tab po qam with food    Declines appt for Adult Psychology for assist w/ coping   RTC in two  weeks   Refer to NICU MOMS Support Group online via [employer]   Refer to [facility] NICU Support Groups   Contact provider as needed via [employer]

## 2024-05-09 ENCOUNTER — LACTATION ENCOUNTER (OUTPATIENT)
Dept: OTHER | Facility: HOSPITAL | Age: 34
End: 2024-05-09

## 2024-05-09 NOTE — LACTATION NOTE
This note was copied from a baby's chart.  Lactation Consultant Note  Recommendations/Summary  Met with Mom at patient bedside. She reports maybe 3-4x/day with no night pumping. She has accepted of making ~ 1.5-2 oz per day for [place]. She indicates she is doing what she can to provide some breast milk for infant. Mom's decision is supported.  Invited to contact LC services as needed.

## 2024-05-14 ENCOUNTER — APPOINTMENT (OUTPATIENT)
Dept: BEHAVIORAL HEALTH | Facility: HOSPITAL | Age: 34
End: 2024-05-14
Payer: COMMERCIAL

## 2024-05-16 ENCOUNTER — TELEMEDICINE (OUTPATIENT)
Dept: BEHAVIORAL HEALTH | Facility: CLINIC | Age: 34
End: 2024-05-16
Payer: COMMERCIAL

## 2024-05-16 DIAGNOSIS — F43.23 ADJUSTMENT DISORDER WITH MIXED ANXIETY AND DEPRESSED MOOD: ICD-10-CM

## 2024-05-16 PROCEDURE — 99213 OFFICE O/P EST LOW 20 MIN: CPT | Performed by: CLINICAL NURSE SPECIALIST

## 2024-05-16 NOTE — PROGRESS NOTES
"Subjective   Patient ID: Emelyn Perez is a 33 y.o. female who presents for   HPI  Review of Systems   All other systems reviewed and are negative.     Psych Review of Symptoms  Objective   Physical Exam  Psychiatric:         Attention and Perception: Attention and perception normal.         Mood and Affect: Mood is anxious. Affect is labile and tearful.         Speech: Speech normal.         Behavior: Behavior normal. Behavior is cooperative.         Thought Content: Thought content normal.         Cognition and Memory: Cognition and memory normal.         Judgment: Judgment normal.     INTERIM: Presents as tearful and in distress, reports baby Jesús was moved out of NICU to Step Down Unit. Highly anxious, fearful that nurses do not know him after baby in NICU for 3 months, ;now more is on me'.  Baby is off of ventilator, attempting to bottle feed She now plans to stay overnight several times per week to learn hi routine before home which is tentatively early July. Prior to this mood and anxiety were improved.  doing better, \" Was developing relationships with other NICU moms. Otherwise, same NICU Support Group. Pt is present for feeding, changes, able to get some sleep. She increased to 1 full tablet of Sertraline 50 mg two days ago. Family supports her when she is there. Has another NICU mom friend, Sleeping well at home. She and  are getting out socially.            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  No prior mental health history. Three  months postpartum after delivering baby boy, Jesús, 24 via urgent CSX, at 27.1 wga weighing 4 lbs per IVF pregnancy. His EDC was 2024. Pt spends most days and now some overnight in Step Down Unit, Low Risk for Harm due to ill child with high self blame, Protected by marriage, employment, family support, help seeking. Discussed plan for coping ie socializing w/ friend, getting out of hospital each day, and walking three times per week. " "Reports her ST Disability was accepted through May 28, 2024 for [employer]. Parents and sister come and stay with pt at Step Down Unit.  Tolerating Sertraline 50 mg 1 tab po qam. Continues to decline referral for ind therapy. Discussed re-framing situation w/ emphasis on his progress, encouraged to trust process, and reality test irrational and 'what if\" statements. l l          Diagnoses :   Adjustment Disorder w/ Depressed and Anxious Mood   Three months postpartum      Treatment Planning:  Increase Sertraline 50 mg take 1 tab po qam with food    Declines appt for Adult Psychology for assist w/ coping   RTC in one week   Refer to NICU MOMS Support Group online via [employer]   Refer to [facility] NICU Support Groups   Contact provider as needed via [employer]  Review STD form                         "

## 2024-05-23 ENCOUNTER — TELEMEDICINE (OUTPATIENT)
Dept: BEHAVIORAL HEALTH | Facility: CLINIC | Age: 34
End: 2024-05-23
Payer: COMMERCIAL

## 2024-05-23 DIAGNOSIS — F43.23 ADJUSTMENT DISORDER WITH MIXED ANXIETY AND DEPRESSED MOOD: ICD-10-CM

## 2024-05-23 PROCEDURE — 99213 OFFICE O/P EST LOW 20 MIN: CPT | Performed by: CLINICAL NURSE SPECIALIST

## 2024-05-23 NOTE — PROGRESS NOTES
Subjective   Patient ID: Emelyn Perez is a 33 y.o. female who presents for Adjustment Disorder w/ Anxiety due to baby in NICU.   HPI  Review of Systems   All other systems reviewed and are negative.   Psych Review of Symptoms  Objective   Physical Exam  Psychiatric:         Attention and Perception: Attention and perception normal.         Mood and Affect: Mood is anxious.         Speech: Speech normal.         Behavior: Behavior normal. Behavior is cooperative.         Thought Content: Thought content normal.         Cognition and Memory: Cognition and memory normal.         Judgment: Judgment normal.       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  No prior mental health history. Three months postpartum after delivering baby boyJesús, 24 via urgent CSX, at 27.1 wga weighing 4 lbs per IVF pregnancy. His EDC was 2024. Pt spends most days and now some overnight in Step Down Unit.      One week follow up: Still anxious, worried for baby  Mood improved, appears more optimistic. Pt is now feeling about 60 % attached to baby whereas earlier it was 20%. Over past week she has been bathing and feeding Jesús, and is now sleeping at Step Down unit. Planning for him to be discharged home within week provided he continues to  make steady progress, which he appears to be doing.  Encouraged self care for patient, instilled hope . Discussed revising STD paperwork for another 2 months to [id number] for now.      Low Risk for Harm due to ill child with high self blame, Protected by marriage, employment, family support, help seeking. Discussed plan for coping ie socializing w/ friend, getting out of hospital each day, and walking three times per week. Reports her ST Disability was accepted through May 28, 2024 for FT [employer]. Parents and sister come and stay with pt at Step Down Unit.  Tolerating Sertraline 50 mg 1 tab po qam. Continues to decline referral for ind therapy. Discussed re-framing situation w/ emphasis on his  "progress, encouraged to trust process, and reality test irrational and 'what if\" statements.           Diagnoses :   Adjustment Disorder w/ Depressed and Anxious Mood   Three months postpartum      Treatment Planning:  Cont Sertraline 50 mg take 1 tab po qam with food    Declines appt for Adult Psychology for assist w/ coping   RTC in one week   Refer to NICU MOMS Support Group online via [employer]PS[employer]   Refer to [facility] NICU Support Groups   Contact provider as needed via [employer]  Review STD form                                         "

## 2024-05-29 ENCOUNTER — TELEMEDICINE (OUTPATIENT)
Dept: BEHAVIORAL HEALTH | Facility: CLINIC | Age: 34
End: 2024-05-29
Payer: COMMERCIAL

## 2024-05-29 DIAGNOSIS — F43.23 ADJUSTMENT DISORDER WITH MIXED ANXIETY AND DEPRESSED MOOD: ICD-10-CM

## 2024-05-29 PROCEDURE — 99214 OFFICE O/P EST MOD 30 MIN: CPT | Performed by: CLINICAL NURSE SPECIALIST

## 2024-05-29 NOTE — PROGRESS NOTES
"Subjective   Patient ID: Emelyn Perez is a 33 y.o. female who presents for Adjustment Disorder w/ Anxiety .    HPI  Review of Systems  BH Psych Review of Symptoms  Objective   Physical Exam  INTERIM: Increased Sertraline to 50 mg po qam, and appears better but still highly anxious and preoccupied w/ baby. \"When he does well, I do well\" Mood labile, tearful at times in discussing Jesús, irritable when baby has upcoming tests, or cannot be at hospital. Some social isolation and anhedonia. No issues of SI/HI. Pt able to care for self, and assist w/ care of baby at hospital. Her attention is limited to anything other than baby at this point. Sleep is fair. Baby is progressing toward going home with oxygen and a feeding tube.  Pt is anxious and worried about caring for him at home under these conditions, yet remains motivated to learn techniques and how to manage his care.       Assessment/Plan   MP: 34 yo MF  [employer].  No prior mental health history. Three months postpartum after delivering baby boyJesús, 24 via urgent CSX, at 27.1 wga weighing 4 lbs per IVF pregnancy. His EDC was 2024. Pt spends most days and now some overnight in Step Down Unit.       One week follow up: Still anxious, worried for baby  Mood improved, appears more optimistic. Over past week she is more involved in learning ad providing his care ie bathing, feeding, changing Jesús, and is now sleeping at Step Down unit on  night weekly.  Planning for him to be discharged home within several weeks pending steady progress. Continue to encourage self care, assume normal routines when at home ie getting out of house, increasing social interaction with others, adequate sleep/diet. e . Discussed revised goal for STD paperwork for another 2 months from  to 2024 for now.     Plan   Cont Sertraline 50 mg po qam   Refer to ind therapy- pending  Contact provider as needed   Will continue to support pt with bridge appts " until established with an ind therapist.   RTC 2 week

## 2024-06-12 ENCOUNTER — APPOINTMENT (OUTPATIENT)
Dept: BEHAVIORAL HEALTH | Facility: CLINIC | Age: 34
End: 2024-06-12
Payer: COMMERCIAL

## 2024-08-01 ENCOUNTER — LAB (OUTPATIENT)
Dept: LAB | Facility: LAB | Age: 34
End: 2024-08-01
Payer: COMMERCIAL

## 2024-08-01 ENCOUNTER — TELEPHONE (OUTPATIENT)
Dept: OBSTETRICS AND GYNECOLOGY | Facility: CLINIC | Age: 34
End: 2024-08-01

## 2024-08-01 DIAGNOSIS — Q95.0 BALANCED AUTOSOMAL TRANSLOCATION IN NORMAL INDIVIDUAL: ICD-10-CM

## 2024-08-01 DIAGNOSIS — O36.80X0 PREGNANCY WITH UNCERTAIN FETAL VIABILITY, SINGLE OR UNSPECIFIED FETUS (HHS-HCC): ICD-10-CM

## 2024-08-01 LAB — B-HCG SERPL-ACNC: ABNORMAL MIU/ML

## 2024-08-01 PROCEDURE — 36415 COLL VENOUS BLD VENIPUNCTURE: CPT

## 2024-08-01 PROCEDURE — 84702 CHORIONIC GONADOTROPIN TEST: CPT

## 2024-08-01 NOTE — TELEPHONE ENCOUNTER
Patient sent the following email to Dr Abad this morning:    Good morning Dr. Abad,     I have been spotting brown for 4 days and took a pregnancy test this morning and IT'S POSITIVE! I'm freaking out, do you think I need blood work or should I wait another week and make sure I don't miscarry due to the balanced translocation?     Thank you,  Emelyn Perez    Per Dr Abad, patient needs to have HCG's drawn to check trend.    Message forwarded to Dr Perdomo, due to Dr Abad being post call, so she can order HCG's.  Lab order pending

## 2024-08-02 ENCOUNTER — HOSPITAL ENCOUNTER (OUTPATIENT)
Dept: RADIOLOGY | Facility: CLINIC | Age: 34
Discharge: HOME | End: 2024-08-02
Payer: COMMERCIAL

## 2024-08-02 DIAGNOSIS — O36.80X0 PREGNANCY WITH UNCERTAIN FETAL VIABILITY, SINGLE OR UNSPECIFIED FETUS (HHS-HCC): ICD-10-CM

## 2024-08-02 DIAGNOSIS — Q95.0 BALANCED AUTOSOMAL TRANSLOCATION IN NORMAL INDIVIDUAL: ICD-10-CM

## 2024-08-02 PROCEDURE — 76817 TRANSVAGINAL US OBSTETRIC: CPT

## 2024-08-02 PROCEDURE — 76817 TRANSVAGINAL US OBSTETRIC: CPT | Performed by: OBSTETRICS & GYNECOLOGY

## 2024-08-02 PROCEDURE — 76801 OB US < 14 WKS SINGLE FETUS: CPT | Performed by: OBSTETRICS & GYNECOLOGY

## 2024-08-02 PROCEDURE — 76801 OB US < 14 WKS SINGLE FETUS: CPT

## 2024-08-02 NOTE — TELEPHONE ENCOUNTER
Patient called ob line stating that she just left her ultrasound and was told she is 6 weeks 2 days today.  She is very concerned due to her having severe pre-eclampsia last pregnancy and having to have a classical .    What are her next steps?  Do you want me to schedule her to see you for NOB or will she need to be seen by MFM for pregnancy due to history.    Please advise

## 2024-08-16 ENCOUNTER — HOSPITAL ENCOUNTER (OUTPATIENT)
Dept: RADIOLOGY | Facility: CLINIC | Age: 34
Discharge: HOME | End: 2024-08-16
Payer: COMMERCIAL

## 2024-08-16 DIAGNOSIS — Q95.0 BALANCED AUTOSOMAL TRANSLOCATION IN NORMAL INDIVIDUAL: ICD-10-CM

## 2024-08-16 DIAGNOSIS — O36.80X0 PREGNANCY WITH UNCERTAIN FETAL VIABILITY, SINGLE OR UNSPECIFIED FETUS (HHS-HCC): ICD-10-CM

## 2024-08-16 PROCEDURE — 76817 TRANSVAGINAL US OBSTETRIC: CPT

## 2024-08-16 PROCEDURE — 76816 OB US FOLLOW-UP PER FETUS: CPT

## 2024-08-19 ENCOUNTER — APPOINTMENT (OUTPATIENT)
Dept: OBSTETRICS AND GYNECOLOGY | Facility: CLINIC | Age: 34
End: 2024-08-19
Payer: COMMERCIAL

## 2024-08-19 VITALS
WEIGHT: 164.25 LBS | BODY MASS INDEX: 25.73 KG/M2 | DIASTOLIC BLOOD PRESSURE: 82 MMHG | SYSTOLIC BLOOD PRESSURE: 132 MMHG

## 2024-08-19 DIAGNOSIS — Z34.91 PRENATAL CARE IN FIRST TRIMESTER (HHS-HCC): Primary | ICD-10-CM

## 2024-08-19 DIAGNOSIS — Z3A.08 8 WEEKS GESTATION OF PREGNANCY (HHS-HCC): ICD-10-CM

## 2024-08-19 DIAGNOSIS — Z98.891 H/O CESAREAN SECTION: ICD-10-CM

## 2024-08-19 DIAGNOSIS — Q95.0 BALANCED AUTOSOMAL TRANSLOCATION IN NORMAL INDIVIDUAL: ICD-10-CM

## 2024-08-19 DIAGNOSIS — Z87.59 HISTORY OF SEVERE PRE-ECLAMPSIA: ICD-10-CM

## 2024-08-19 PROBLEM — Z31.83 IN VITRO FERTILIZATION: Status: RESOLVED | Noted: 2023-10-20 | Resolved: 2024-08-19

## 2024-08-19 PROCEDURE — 87491 CHLMYD TRACH DNA AMP PROBE: CPT

## 2024-08-19 PROCEDURE — 87591 N.GONORRHOEAE DNA AMP PROB: CPT

## 2024-08-19 PROCEDURE — 0500F INITIAL PRENATAL CARE VISIT: CPT | Performed by: OBSTETRICS & GYNECOLOGY

## 2024-08-19 PROCEDURE — 87086 URINE CULTURE/COLONY COUNT: CPT

## 2024-08-19 ASSESSMENT — EDINBURGH POSTNATAL DEPRESSION SCALE (EPDS)
I HAVE BEEN ABLE TO LAUGH AND SEE THE FUNNY SIDE OF THINGS: AS MUCH AS I ALWAYS COULD
I HAVE LOOKED FORWARD WITH ENJOYMENT TO THINGS: AS MUCH AS I EVER DID
I HAVE FELT SCARED OR PANICKY FOR NO GOOD REASON: YES, SOMETIMES
I HAVE BEEN SO UNHAPPY THAT I HAVE BEEN CRYING: NO, NEVER
I HAVE BEEN SO UNHAPPY THAT I HAVE HAD DIFFICULTY SLEEPING: NOT AT ALL
TOTAL SCORE: 6
I HAVE BLAMED MYSELF UNNECESSARILY WHEN THINGS WENT WRONG: YES, SOME OF THE TIME
I HAVE FELT SAD OR MISERABLE: NO, NOT AT ALL
THE THOUGHT OF HARMING MYSELF HAS OCCURRED TO ME: NEVER
I HAVE BEEN ANXIOUS OR WORRIED FOR NO GOOD REASON: YES, SOMETIMES
THINGS HAVE BEEN GETTING ON TOP OF ME: NO, I HAVE BEEN COPING AS WELL AS EVER

## 2024-08-19 NOTE — PROGRESS NOTES
Subjective   Patient ID [id number]   Emelyn Perez is a 33 y.o.  at 8w5d with a working estimated date of delivery of 3/26/2025, by Ultrasound who presents for an initial prenatal visit.      Her pregnancy is complicated by:  -h/o sPEC and FGR in last pregnancy, diagnosed at 23.5 wks in last pregnancy, ultimately delivered at 26.5 wks for worsening UAD, delivery by classical  section for breech/FGR/sPEC   -h/o known balanced translocation, last pregnancy was IVF for this indication, this pregnancy is a spontaneous conception  -h/o classical  section  -close interval pregnancy (infant is 6 months old)    OB History    Para Term  AB Living   4 1   1 2 1   SAB IAB Ectopic Multiple Live Births   2     0 1      # Outcome Date GA Lbr Chevy/2nd Weight Sex Type Anes PTL Lv   4 Current            3  24 26w5d  0.66 kg M CS-Classical CSE  SVETA      Complications: Hemorrhage, Uterine Atony   2 SAB            1 SAB              [place]  Depression Scale Total: 6    Objective   Physical Exam  Weight: 74.5 kg (164 lb 4 oz)  Expected Total Weight Gain: 7 kg (15 lb)-11.5 kg (25 lb)   Pregravid BMI: 25.68  BP: 132/82    OBGyn Exam  Gen in NAD  FCA seen by trans-abdominal ultrasound (has had two formal scans for viability given no LMP)    Prenatal Labs  pending    Assessment/Plan   34 y/o  at 8.5 by formal US (no LMP) presenting for new ob visit.  -continue prenatal vitamin  -start 162 mg ASA at 12 wks for preeclampsia risk reduction  -has appointment with [facility] in two days with Dr. Walton, will transfer care to [facility] for this pregnancy given low likelihood of being able to deliver at [facility]  -briefly discussed recurrence risk, need for delivery by repeat  section   -briefly discussed genetic screening options etc  -plan transfer of care to [facility]    Radha Abad MD

## 2024-08-20 LAB
BACTERIA UR CULT: NORMAL
C TRACH RRNA SPEC QL NAA+PROBE: NEGATIVE
N GONORRHOEA DNA SPEC QL PROBE+SIG AMP: NEGATIVE

## 2024-08-21 ENCOUNTER — INITIAL PRENATAL (OUTPATIENT)
Dept: MATERNAL FETAL MEDICINE | Facility: CLINIC | Age: 34
End: 2024-08-21
Payer: COMMERCIAL

## 2024-08-21 VITALS — BODY MASS INDEX: 25.53 KG/M2 | DIASTOLIC BLOOD PRESSURE: 90 MMHG | SYSTOLIC BLOOD PRESSURE: 134 MMHG | WEIGHT: 163 LBS

## 2024-08-21 DIAGNOSIS — Z3A.08 8 WEEKS GESTATION OF PREGNANCY (HHS-HCC): Primary | ICD-10-CM

## 2024-08-21 DIAGNOSIS — Z87.59 HISTORY OF SEVERE PRE-ECLAMPSIA: ICD-10-CM

## 2024-08-21 DIAGNOSIS — Q95.0 BALANCED AUTOSOMAL TRANSLOCATION IN NORMAL INDIVIDUAL: ICD-10-CM

## 2024-08-21 DIAGNOSIS — Z34.90 PRENATAL CARE, ANTEPARTUM (HHS-HCC): ICD-10-CM

## 2024-08-21 DIAGNOSIS — O36.80X0 PREGNANCY WITH UNCERTAIN FETAL VIABILITY, SINGLE OR UNSPECIFIED FETUS (HHS-HCC): ICD-10-CM

## 2024-08-21 PROCEDURE — 99215 OFFICE O/P EST HI 40 MIN: CPT | Performed by: OBSTETRICS & GYNECOLOGY

## 2024-08-21 NOTE — LETTER
2024     Radha Abad MD  [id number] [facility]  [place] [address]    Patient: Emelyn Perez   YOB: 1990   Date of Visit: 2024       Dear Dr. Radha Abad MD:    Thank you for referring Emelyn Perez to me for evaluation. Below are my notes for this consultation.  If you have questions, please do not hesitate to call me. I look forward to following your patient along with you.       Sincerely,     Elmo Walton MD      CC: No Recipients  ______________________________________________________________________________________    2024   Emelyn Perez     [facility] INITIAL PRENATAL NOTE      HPI: Emelyn Perez is a 33 y.o.  at 8 weeks here for visit for pregnancy in the setting of prior pregnancy with severe preeclampsia with early onset severe FGR requiring classical  at 26 weeks    Feeling ok; current pregnancy was unplanned and having some anxiety related to last, recent delivery.  But feeling cautiously hopefully.  Denies any pain, bleeding.  Has been checking BP at home and Bps have been in 100-110/80s at home.    Well known to [facility] service during recent pregnancy which was complicated by severe early onset FGR and preeclampsia with severe features that resulted in delivery at 26 weeks for abnormal fetal surveillance.     10 point review of system is negative except as above    OB History  OB History    Para Term  AB Living   4 1 0 1 2 1   SAB IAB Ectopic Multiple Live Births   2 0 0 0 1      # Outcome Date GA Lbr Chevy/2nd Weight Sex Type Anes PTL Lv   4 Current            3  24 26w5d  0.66 kg M CS-Classical CSE  SVETA      Complications: Hemorrhage, Uterine Atony      Name: GERONIMO PEREZ      Apgar1: 2  Apgar5: 4   2 SAB            1 SAB                Medical History  Past Medical History:   Diagnosis Date   • Depression    • History of miscarriage    • History of  delivery    • Infertility counseling    • [place]  product of in vitro fertilization (IVF) pregnancy ([facility])    • Personal history of other diseases of the respiratory system 2020    History of sinusitis   • Seasonal allergies        Surgical History  Past Surgical History:   Procedure Laterality Date   • OTHER SURGICAL HISTORY  2020    No history of surgery       Family History  family history includes No Known Problems in her father and mother.    Social History  Social History     Tobacco Use   • Smoking status: Never     Passive exposure: Never   • Smokeless tobacco: Never   Vaping Use   • Vaping status: Never Used   Substance Use Topics   • Alcohol use: Not Currently   • Drug use: Never       Allergies  No Known Allergies    Medications:  Medication Documentation Review Audit       Reviewed by Merna Calvillo MA (Medical Assistant) on 24 at 0926      Medication Order Taking? Sig Documenting Provider Last Dose Status   acetaminophen (Tylenol) 500 mg tablet [id number]  Take 2 tablets (1,000 mg) by mouth every 6 hours if needed for moderate pain (4 - 6). RAFIA Frye  Active   NIFEdipine ER (Adalat CC) 30 mg 24 hr tablet [id number]  Take 1 tablet (30 mg) by mouth once daily at bedtime. Do not crush, chew, or split. RAFIA Frye   24 2359   NIFEdipine ER (Adalat CC) 90 mg 24 hr tablet [id number]  Take 1 tablet (90 mg) by mouth once daily in the morning. Take before meals. Do not crush, chew, or split. Do not start before 2024. RAFIA Frye   24 235   prenatal vit no.124/iron/folic (PRENATAL VITAMIN ORAL) [id number]  Take by mouth. Historical Provider, MD  Active   sertraline (Zoloft) 50 mg tablet [id number]  Take 1 tablet (50 mg) by mouth once daily. Take 1/2 tab for 5 -7 days , then increase to 1 tab po qam Take with food RAFIA Brown   07/10/24 2359                    OBJECTIVE  Visit Vitals  /90   Wt 73.9 kg (163 lb)   BMI 25.53 kg/m²   OB Status  Pregnant   Smoking Status Never   BSA 1.87 m²       Physical exam  Gen: NAD  HEENT: EOMI, CN2-12 intact  Pulm: non-labored    ASSESSMENT & PLAN    Emelyn Perez is a 33 y.o.  at 8 weeks here for the followin. History of severe preeclampsia and early onset FGR  I have discussed some of the risk factors for pre-eclampsia. We discussed that the recurrence rate of pre-eclampsia in a subsequent pregnancy is ~15-20%, though the rate can be higher for those with severe disease and/or presented at earlier gestational ages.  Women with obesity, HTN or diabetes have an increased risk of pre-eclampsia and if these co-morbidities are present should be optimized prior to conception with weight loss, pharmacotherapy or both.  We discussed that the use of low dose aspirin starting at 12 weeks gestation has been demonstrated to reduce the risk of recurrence in women at high risk.   Otherwise there is insufficient data to recommend other prevention techniques and any subsequent pregnancy should be managed with close observation and baseline pre-eclampsia laboratory evaluation.  We did discuss the association of preeclampsia in a prior pregnancy with the development of fetal growth restriction n a subsequent pregnancy and that I would recommend assessment of fetal growth by ultrasound in the third trimester.  We also discussed that having a history of  delivery <34 weeks with a morphologically normal fetus due to pre-eclampsia or eclampsia is one of the clinical criteria for screening for the Antiphospholipid Antibody Syndrome, given the severity of her presentation lab evaluation as ordered today  - Surg path reviewed without discrete lesions at high risk of recurrence  - APS labs ordered  - Baseline PEC labs ordered  - BP mildly elevated today, but has significant anxiety and home BPS have been normal.  Will monitor and will bring BP cuff to next visit  - Will get early assessment of fetal growth at 16 weeks  and plan for serial fetal growth every 4 weeks following anatomy US.    2.  Classical   -rCS at 36-37 weeks    4. ASCUS pos HRHPV other  - Due for repeat     5. Hx PP depression  - Having some anxiety but overall feeling well today  - No meds and declines referral to behavioral health (feels prior depression mostly driven by situational stressors of infant in NICU)    6. 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  -Re-reviewed that typical manifestation of affected pregnancy is early loss and testing options.    7. PNC  - PNB ordered today  - RTC 4 weeks    I spent 60 minutes in the professional and overall care of this patient.    Elmo Walton MD  Maternal Fetal Medicine

## 2024-08-22 ENCOUNTER — LAB (OUTPATIENT)
Dept: LAB | Facility: LAB | Age: 34
End: 2024-08-22
Payer: COMMERCIAL

## 2024-08-22 DIAGNOSIS — Z87.59 HISTORY OF SEVERE PRE-ECLAMPSIA: ICD-10-CM

## 2024-08-22 DIAGNOSIS — Z34.90 PRENATAL CARE, ANTEPARTUM (HHS-HCC): ICD-10-CM

## 2024-08-22 LAB
ABO GROUP (TYPE) IN BLOOD: NORMAL
ALBUMIN SERPL BCP-MCNC: 4.5 G/DL (ref 3.4–5)
ALP SERPL-CCNC: 50 U/L (ref 33–110)
ALT SERPL W P-5'-P-CCNC: 13 U/L (ref 7–45)
ANION GAP SERPL CALC-SCNC: 12 MMOL/L (ref 10–20)
ANTIBODY SCREEN: NORMAL
AST SERPL W P-5'-P-CCNC: 15 U/L (ref 9–39)
B2 GLYCOPROT1 IGA SER-ACNC: 1.6 U/ML
B2 GLYCOPROT1 IGG SER-ACNC: <1.4 U/ML
B2 GLYCOPROT1 IGM SER-ACNC: 1.3 U/ML
BILIRUB SERPL-MCNC: 0.4 MG/DL (ref 0–1.2)
BUN SERPL-MCNC: 13 MG/DL (ref 6–23)
CALCIUM SERPL-MCNC: 10.1 MG/DL (ref 8.6–10.3)
CARDIOLIPIN IGA SERPL-ACNC: 0.5 APL U/ML
CARDIOLIPIN IGG SER IA-ACNC: <1.6 GPL U/ML
CARDIOLIPIN IGM SER IA-ACNC: 1.4 MPL U/ML
CHLORIDE SERPL-SCNC: 101 MMOL/L (ref 98–107)
CO2 SERPL-SCNC: 26 MMOL/L (ref 21–32)
CREAT SERPL-MCNC: 0.63 MG/DL (ref 0.5–1.05)
CREAT UR-MCNC: 30.6 MG/DL (ref 20–320)
EGFRCR SERPLBLD CKD-EPI 2021: >90 ML/MIN/1.73M*2
ERYTHROCYTE [DISTWIDTH] IN BLOOD BY AUTOMATED COUNT: 13.9 % (ref 11.5–14.5)
GLUCOSE SERPL-MCNC: 59 MG/DL (ref 74–99)
HBV SURFACE AG SERPL QL IA: NONREACTIVE
HCT VFR BLD AUTO: 41.8 % (ref 36–46)
HCV AB SER QL: NONREACTIVE
HGB BLD-MCNC: 13.5 G/DL (ref 12–16)
HIV 1+2 AB+HIV1 P24 AG SERPL QL IA: NONREACTIVE
LDH SERPL L TO P-CCNC: 161 U/L (ref 84–246)
MCH RBC QN AUTO: 28.8 PG (ref 26–34)
MCHC RBC AUTO-ENTMCNC: 32.3 G/DL (ref 32–36)
MCV RBC AUTO: 89 FL (ref 80–100)
NRBC BLD-RTO: 0 /100 WBCS (ref 0–0)
PLATELET # BLD AUTO: 377 X10*3/UL (ref 150–450)
POTASSIUM SERPL-SCNC: 3.9 MMOL/L (ref 3.5–5.3)
PROT SERPL-MCNC: 7.6 G/DL (ref 6.4–8.2)
PROT UR-ACNC: 5 MG/DL (ref 5–24)
PROT/CREAT UR: 0.16 MG/MG CREAT (ref 0–0.17)
RBC # BLD AUTO: 4.68 X10*6/UL (ref 4–5.2)
REFLEX ADDED, ANEMIA PANEL: NORMAL
RH FACTOR (ANTIGEN D): NORMAL
RUBV IGG SERPL IA-ACNC: 2.5 IA
RUBV IGG SERPL QL IA: POSITIVE
SODIUM SERPL-SCNC: 135 MMOL/L (ref 136–145)
TREPONEMA PALLIDUM IGG+IGM AB [PRESENCE] IN SERUM OR PLASMA BY IMMUNOASSAY: NONREACTIVE
WBC # BLD AUTO: 12.2 X10*3/UL (ref 4.4–11.3)

## 2024-08-22 PROCEDURE — 85027 COMPLETE CBC AUTOMATED: CPT

## 2024-08-22 PROCEDURE — 82570 ASSAY OF URINE CREATININE: CPT

## 2024-08-22 PROCEDURE — 86147 CARDIOLIPIN ANTIBODY EA IG: CPT

## 2024-08-22 PROCEDURE — 80053 COMPREHEN METABOLIC PANEL: CPT

## 2024-08-22 PROCEDURE — 86803 HEPATITIS C AB TEST: CPT

## 2024-08-22 PROCEDURE — 86850 RBC ANTIBODY SCREEN: CPT

## 2024-08-22 PROCEDURE — 86780 TREPONEMA PALLIDUM: CPT

## 2024-08-22 PROCEDURE — 85730 THROMBOPLASTIN TIME PARTIAL: CPT

## 2024-08-22 PROCEDURE — 84156 ASSAY OF PROTEIN URINE: CPT

## 2024-08-22 PROCEDURE — 87389 HIV-1 AG W/HIV-1&-2 AB AG IA: CPT

## 2024-08-22 PROCEDURE — 85613 RUSSELL VIPER VENOM DILUTED: CPT

## 2024-08-22 PROCEDURE — 86900 BLOOD TYPING SEROLOGIC ABO: CPT

## 2024-08-22 PROCEDURE — 36415 COLL VENOUS BLD VENIPUNCTURE: CPT

## 2024-08-22 PROCEDURE — 83021 HEMOGLOBIN CHROMOTOGRAPHY: CPT

## 2024-08-22 PROCEDURE — 86317 IMMUNOASSAY INFECTIOUS AGENT: CPT

## 2024-08-22 PROCEDURE — 87340 HEPATITIS B SURFACE AG IA: CPT

## 2024-08-22 PROCEDURE — 86901 BLOOD TYPING SEROLOGIC RH(D): CPT

## 2024-08-22 PROCEDURE — 86146 BETA-2 GLYCOPROTEIN ANTIBODY: CPT

## 2024-08-22 PROCEDURE — 83615 LACTATE (LD) (LDH) ENZYME: CPT

## 2024-08-23 ENCOUNTER — TELEPHONE (OUTPATIENT)
Dept: RADIOLOGY | Facility: CLINIC | Age: 34
End: 2024-08-23
Payer: COMMERCIAL

## 2024-08-23 LAB
HEMOGLOBIN A2: 2.6 % (ref 2–3.5)
HEMOGLOBIN A: 97.2 % (ref 95.8–98)
HEMOGLOBIN F: 0.2 % (ref 0–2)
HEMOGLOBIN IDENTIFICATION INTERPRETATION: NORMAL
PATH REVIEW-HGB IDENTIFICATION: NORMAL

## 2024-08-23 NOTE — TELEPHONE ENCOUNTER
Spoke with patient to notify labs are WNL. All questions answered. Lupus panel has not yet resulted. Pt. Verbalized understanding.

## 2024-08-26 LAB
DRVVT SCREEN TO CONFIRM RATIO: 0.9 RATIO
DRVVT/DRVVT CFM NRMLZD PPP-RTO: 0.95 RATIO
DRVVT/DRVVT CFM P DOAC NEUT NORM PPP-RTO: 0.95 RATIO
LA 2 SCREEN W REFLEX-IMP: NORMAL
NORMALIZED SCT PPP-RTO: 0.95 RATIO
SILICA CLOTTING TIME CONFIRMATION: 0.99 RATIO
SILICA CLOTTING TIME SCREEN: 0.94 RATIO

## 2024-08-26 NOTE — PROGRESS NOTES
2024   Emelyn Good     [facility] INITIAL PRENATAL NOTE      HPI: Emelyn Good is a 33 y.o.  at 8 weeks here for visit for pregnancy in the setting of prior pregnancy with severe preeclampsia with early onset severe FGR requiring classical  at 26 weeks    Feeling ok; current pregnancy was unplanned and having some anxiety related to last, recent delivery.  But feeling cautiously hopefully.  Denies any pain, bleeding.  Has been checking BP at home and Bps have been in 100-110/80s at home.    Well known to [facility] service during recent pregnancy which was complicated by severe early onset FGR and preeclampsia with severe features that resulted in delivery at 26 weeks for abnormal fetal surveillance.     10 point review of system is negative except as above    OB History  OB History    Para Term  AB Living   4 1 0 1 2 1   SAB IAB Ectopic Multiple Live Births   2 0 0 0 1      # Outcome Date GA Lbr Chevy/2nd Weight Sex Type Anes PTL Lv   4 Current            3  24 26w5d  0.66 kg M CS-Classical CSE  SVETA      Complications: Hemorrhage, Uterine Atony      Name: GERONIMO GOOD      Apgar1: 2  Apgar5: 4   2 SAB            1 SAB                Medical History  Past Medical History:   Diagnosis Date    Depression     History of miscarriage     History of  delivery     Infertility counseling      product of in vitro fertilization (IVF) pregnancy ([facility])     Personal history of other diseases of the respiratory system 2020    History of sinusitis    Seasonal allergies        Surgical History  Past Surgical History:   Procedure Laterality Date    OTHER SURGICAL HISTORY  2020    No history of surgery       Family History  family history includes No Known Problems in her father and mother.    Social History  Social History     Tobacco Use    Smoking status: Never     Passive exposure: Never    Smokeless tobacco: Never   Vaping Use    Vaping status: Never Used    Substance Use Topics    Alcohol use: Not Currently    Drug use: Never       Allergies  No Known Allergies    Medications:  Medication Documentation Review Audit       Reviewed by Merna Calvillo MA (Medical Assistant) on 24 at 0926      Medication Order Taking? Sig Documenting Provider Last Dose Status   acetaminophen (Tylenol) 500 mg tablet [id number]  Take 2 tablets (1,000 mg) by mouth every 6 hours if needed for moderate pain (4 - 6). EMILIANA Frye-CNP  Active   NIFEdipine ER (Adalat CC) 30 mg 24 hr tablet [id number]  Take 1 tablet (30 mg) by mouth once daily at bedtime. Do not crush, chew, or split. EMILIANA Frye-CNP   24 235   NIFEdipine ER (Adalat CC) 90 mg 24 hr tablet [id number]  Take 1 tablet (90 mg) by mouth once daily in the morning. Take before meals. Do not crush, chew, or split. Do not start before 2024. RAFIA Frye   24 235   prenatal vit no.124/iron/folic (PRENATAL VITAMIN ORAL) [id number]  Take by mouth. Historical Provider, MD  Active   sertraline (Zoloft) 50 mg tablet [id number]  Take 1 tablet (50 mg) by mouth once daily. Take 1/2 tab for 5 -7 days , then increase to 1 tab po qam Take with food Viviane Sharp APRN-CNP   07/10/24 235                    OBJECTIVE  Visit Vitals  /90   Wt 73.9 kg (163 lb)   BMI 25.53 kg/m²   OB Status Pregnant   Smoking Status Never   BSA 1.87 m²       Physical exam  Gen: NAD  HEENT: EOMI, CN2-12 intact  Pulm: non-labored    ASSESSMENT & PLAN    Emelyn Perez is a 33 y.o.  at 8 weeks here for the followin. History of severe preeclampsia and early onset FGR  I have discussed some of the risk factors for pre-eclampsia. We discussed that the recurrence rate of pre-eclampsia in a subsequent pregnancy is ~15-20%, though the rate can be higher for those with severe disease and/or presented at earlier gestational ages.  Women with obesity, HTN or diabetes have an increased risk  of pre-eclampsia and if these co-morbidities are present should be optimized prior to conception with weight loss, pharmacotherapy or both.  We discussed that the use of low dose aspirin starting at 12 weeks gestation has been demonstrated to reduce the risk of recurrence in women at high risk.   Otherwise there is insufficient data to recommend other prevention techniques and any subsequent pregnancy should be managed with close observation and baseline pre-eclampsia laboratory evaluation.  We did discuss the association of preeclampsia in a prior pregnancy with the development of fetal growth restriction n a subsequent pregnancy and that I would recommend assessment of fetal growth by ultrasound in the third trimester.  We also discussed that having a history of  delivery <34 weeks with a morphologically normal fetus due to pre-eclampsia or eclampsia is one of the clinical criteria for screening for the Antiphospholipid Antibody Syndrome, given the severity of her presentation lab evaluation as ordered today  - Surg path reviewed without discrete lesions at high risk of recurrence  - APS labs ordered  - Baseline PEC labs ordered  - BP mildly elevated today, but has significant anxiety and home BPS have been normal.  Will monitor and will bring BP cuff to next visit  - Will get early assessment of fetal growth at 16 weeks and plan for serial fetal growth every 4 weeks following anatomy US.    2.  Classical   -rCS at 36-37 weeks    4. ASCUS pos HRHPV other  - Due for repeat     5. Hx PP depression  - Having some anxiety but overall feeling well today  - No meds and declines referral to behavioral health (feels prior depression mostly driven by situational stressors of infant in NICU)    6. 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  -Re-reviewed that typical manifestation of affected pregnancy is early loss and testing options.    7. PNC  - PNB  ordered today  - RTC 4 weeks    I spent 60 minutes in the professional and overall care of this patient.    Elmo Walton MD  Maternal Fetal Medicine

## 2024-09-18 NOTE — PROGRESS NOTES
Subjective   Patient ID [id number]   Emelyn Perez is a 33 y.o.  at 13w2d.  She is followed for history of adverse pregnancy outcome including FGR and classical CD. She had no obstetrics complaints at the time of her visit today. She had no complaints at the time of her visit today.     Objective   Visit Vitals  /81 (BP Location: Right arm, Patient Position: Sitting, BP Cuff Size: Adult)   Pulse 91      Physical Exam  Weight: 75.8 kg (167 lb)  BP: 128/81  Fetal Heart Rate: US      Prenatal Labs    Lab Results   Component Value Date    HGB 13.5 2024    HCT 41.8 2024    ABO O 2024    HEPBSAG Nonreactive 2024       Imaging  First trimester anatomic evaluation today     Assessment/Plan     History of severe preeclampsia and early onset FGR  - Aware of clinical symptoms, BP monitoring and recurrence risk   - Surg path reviewed without discrete lesions at high risk of recurrence  - APS labs WNL  - Baseline PEC labs WNL  - BP mildly elevated first visit, but has significant anxiety, normal today and with home monitoring  - Asprin prophylaxis  - US at 16 weeks then q4 weeks for growth after anatomic utlrasound    Classical   -rCS at 36-37 weeks     ASCUS pos HRHPV other  - Due for repeat      Hx PP depression  - Currently doing well.   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  Reviewed with genetics after visit, see telephone note.   cfDNA, patient is aware of limitations.      PNC  O+  RI   Pap as above  cfDNA as above  Flu shot today     RTC 4 weeks    Sean Mota MD   Maternal Fetal Medicine

## 2024-09-20 ENCOUNTER — HOSPITAL ENCOUNTER (OUTPATIENT)
Dept: RADIOLOGY | Facility: CLINIC | Age: 34
Discharge: HOME | End: 2024-09-20
Payer: COMMERCIAL

## 2024-09-20 ENCOUNTER — APPOINTMENT (OUTPATIENT)
Dept: RADIOLOGY | Facility: CLINIC | Age: 34
End: 2024-09-20
Payer: COMMERCIAL

## 2024-09-20 ENCOUNTER — TELEPHONE (OUTPATIENT)
Dept: GENETICS | Facility: CLINIC | Age: 34
End: 2024-09-20

## 2024-09-20 ENCOUNTER — OFFICE VISIT (OUTPATIENT)
Dept: MATERNAL FETAL MEDICINE | Facility: CLINIC | Age: 34
End: 2024-09-20
Payer: COMMERCIAL

## 2024-09-20 VITALS
DIASTOLIC BLOOD PRESSURE: 81 MMHG | WEIGHT: 167 LBS | HEIGHT: 67 IN | HEART RATE: 91 BPM | SYSTOLIC BLOOD PRESSURE: 128 MMHG | BODY MASS INDEX: 26.21 KG/M2

## 2024-09-20 DIAGNOSIS — Q95.0 BALANCED AUTOSOMAL TRANSLOCATION IN NORMAL INDIVIDUAL: Primary | ICD-10-CM

## 2024-09-20 DIAGNOSIS — Z34.90 PRENATAL CARE, ANTEPARTUM (HHS-HCC): ICD-10-CM

## 2024-09-20 DIAGNOSIS — Z23 NEEDS FLU SHOT: ICD-10-CM

## 2024-09-20 DIAGNOSIS — Z98.891 H/O CESAREAN SECTION: ICD-10-CM

## 2024-09-20 DIAGNOSIS — O36.80X0 PREGNANCY WITH UNCERTAIN FETAL VIABILITY, SINGLE OR UNSPECIFIED FETUS (HHS-HCC): ICD-10-CM

## 2024-09-20 DIAGNOSIS — Z87.59 HISTORY OF SEVERE PRE-ECLAMPSIA: ICD-10-CM

## 2024-09-20 DIAGNOSIS — Z3A.08 8 WEEKS GESTATION OF PREGNANCY (HHS-HCC): ICD-10-CM

## 2024-09-20 DIAGNOSIS — Q95.0 BALANCED AUTOSOMAL TRANSLOCATION IN NORMAL INDIVIDUAL: ICD-10-CM

## 2024-09-20 LAB
POC APPEARANCE, URINE: CLEAR
POC BILIRUBIN, URINE: NEGATIVE
POC BLOOD, URINE: NEGATIVE
POC COLOR, URINE: YELLOW
POC GLUCOSE, URINE: NEGATIVE MG/DL
POC KETONES, URINE: NEGATIVE MG/DL
POC LEUKOCYTES, URINE: NEGATIVE
POC NITRITE,URINE: NEGATIVE
POC PH, URINE: 5.5 PH
POC PROTEIN, URINE: NEGATIVE MG/DL
POC SPECIFIC GRAVITY, URINE: 1.01
POC UROBILINOGEN, URINE: 0.2 EU/DL

## 2024-09-20 PROCEDURE — 0501F PRENATAL FLOW SHEET: CPT | Performed by: OBSTETRICS & GYNECOLOGY

## 2024-09-20 PROCEDURE — 3008F BODY MASS INDEX DOCD: CPT | Performed by: OBSTETRICS & GYNECOLOGY

## 2024-09-20 PROCEDURE — 76813 OB US NUCHAL MEAS 1 GEST: CPT

## 2024-09-20 PROCEDURE — 36415 COLL VENOUS BLD VENIPUNCTURE: CPT | Performed by: OBSTETRICS & GYNECOLOGY

## 2024-09-20 PROCEDURE — 81003 URINALYSIS AUTO W/O SCOPE: CPT | Performed by: OBSTETRICS & GYNECOLOGY

## 2024-09-20 PROCEDURE — 90471 IMMUNIZATION ADMIN: CPT | Performed by: OBSTETRICS & GYNECOLOGY

## 2024-09-20 ASSESSMENT — PAIN SCALES - GENERAL: PAINLEVEL: 0-NO PAIN

## 2024-09-20 NOTE — TELEPHONE ENCOUNTER
Called patient to briefly discuss cfDNA screening in the context of her balanced translocation.    Discussed that she had cfDNA screening drawn today to look for common aneuploidies including Down syndrome; however this testing does not screen for the unbalanced form of her translocation (patient's karyotype is 46 XX t(12;20)(p10q10)). As these segments are large, the unbalanced segments would be almost complete monosomy/trisomy of these chromosomes are involving segments of 28-29MB. There is a cfDNA screening that assesses all of the chromosomes for imbalances >7MB and we could offer that screening instead ([facility]).     As most conceptions with an unbalanced version of this translocation are expected to result in first trimeter loss, it is unlikely that an ongoing pregnancy at this gestational age has an unbalanced version; however we cannot state that the risk is zero or that the pregnancy is definitely unaffected.     Patient states she feels the risk is low, and wishes to proceed with screening for common aneuploidies only. We reviewed that diagnostic chromosome testing prenatally or postnatally is the only way to determine of the fetus has the balanced translocation, that this testing is NOT available by maternal blood. Patient also states she declines prenatal diagnostic testing.     Tati He MS, [facility]

## 2024-10-04 LAB — COMMENTS - MP RESULT TYPE: NORMAL

## 2024-10-18 ENCOUNTER — OFFICE VISIT (OUTPATIENT)
Dept: MATERNAL FETAL MEDICINE | Facility: CLINIC | Age: 34
End: 2024-10-18
Payer: COMMERCIAL

## 2024-10-18 ENCOUNTER — HOSPITAL ENCOUNTER (OUTPATIENT)
Dept: RADIOLOGY | Facility: CLINIC | Age: 34
Discharge: HOME | End: 2024-10-18
Payer: COMMERCIAL

## 2024-10-18 VITALS
BODY MASS INDEX: 26.76 KG/M2 | HEART RATE: 83 BPM | HEIGHT: 67 IN | WEIGHT: 170.5 LBS | DIASTOLIC BLOOD PRESSURE: 78 MMHG | SYSTOLIC BLOOD PRESSURE: 115 MMHG

## 2024-10-18 DIAGNOSIS — O09.292: Primary | ICD-10-CM

## 2024-10-18 DIAGNOSIS — Q95.0 BALANCED AUTOSOMAL TRANSLOCATION IN NORMAL INDIVIDUAL: ICD-10-CM

## 2024-10-18 DIAGNOSIS — Z34.91 PRENATAL CARE IN FIRST TRIMESTER (HHS-HCC): ICD-10-CM

## 2024-10-18 DIAGNOSIS — Z87.59 HISTORY OF SEVERE PRE-ECLAMPSIA: ICD-10-CM

## 2024-10-18 DIAGNOSIS — Z3A.17 17 WEEKS GESTATION OF PREGNANCY (HHS-HCC): ICD-10-CM

## 2024-10-18 LAB
POC APPEARANCE, URINE: CLEAR
POC BILIRUBIN, URINE: NEGATIVE
POC BLOOD, URINE: NEGATIVE
POC COLOR, URINE: YELLOW
POC GLUCOSE, URINE: NEGATIVE MG/DL
POC KETONES, URINE: NEGATIVE MG/DL
POC LEUKOCYTES, URINE: NEGATIVE
POC NITRITE,URINE: NEGATIVE
POC PH, URINE: 6 PH
POC PROTEIN, URINE: NEGATIVE MG/DL
POC SPECIFIC GRAVITY, URINE: 1.01
POC UROBILINOGEN, URINE: 0.2 EU/DL

## 2024-10-18 PROCEDURE — 1036F TOBACCO NON-USER: CPT | Performed by: STUDENT IN AN ORGANIZED HEALTH CARE EDUCATION/TRAINING PROGRAM

## 2024-10-18 PROCEDURE — 76815 OB US LIMITED FETUS(S): CPT

## 2024-10-18 PROCEDURE — 3008F BODY MASS INDEX DOCD: CPT | Performed by: STUDENT IN AN ORGANIZED HEALTH CARE EDUCATION/TRAINING PROGRAM

## 2024-10-18 PROCEDURE — 0501F PRENATAL FLOW SHEET: CPT | Performed by: STUDENT IN AN ORGANIZED HEALTH CARE EDUCATION/TRAINING PROGRAM

## 2024-10-18 PROCEDURE — 81003 URINALYSIS AUTO W/O SCOPE: CPT | Performed by: STUDENT IN AN ORGANIZED HEALTH CARE EDUCATION/TRAINING PROGRAM

## 2024-10-18 ASSESSMENT — PAIN SCALES - GENERAL: PAINLEVEL_OUTOF10: 0-NO PAIN

## 2024-10-18 NOTE — ASSESSMENT & PLAN NOTE
Desired provider in labor: [] CNM  [x] Physician  [x] Blood Products: [x] Yes, accepts [] No, needs counseling  [x] Initial BMI: 25.68   [x] Prenatal Labs: within normal limits  [x] Cervical Cancer Screening up to date: due for repeat pap next month due to prior +HRHPV result, will perform at next visit  [x] Rh status: Rh pos  [x] Genetic Screening: see other problem  [x] NT US (11-13 wks): WNL  [x] Baby ASA (if indicated): taking  [x] Pregnancy dated by: US, no LMP

## 2024-10-18 NOTE — ASSESSMENT & PLAN NOTE
-History of 26 week delivery due to preeclampsia with severe features and fetal growth restriction  -Normal baseline labs, APLS negative  -Taking aspirin  -Following Bps at home and is aware of signs/symptoms and recurrence risk   -Plan for additional ultrasounds and fetal surveillance in the third trimester  Orders:    POCT UA Automated manually resulted

## 2024-10-18 NOTE — ASSESSMENT & PLAN NOTE
-History of 26 week delivery due to preeclampsia with severe features and fetal growth restriction  -Normal baseline labs, APLS negative  -Taking aspirin  -Following Bps at home and is aware of signs/symptoms and recurrence risk   -Plan for additional ultrasounds and fetal surveillance in the third trimester

## 2024-10-18 NOTE — PROGRESS NOTES
"[facility] Follow-up  10/18/2024         SUBJECTIVE    HPI: Emelyn Perez is a 33 y.o.  at 17w2d here for RPNV. Patient denies complaints today. She was nervous to find out that she was pregnant shortly after her last baby was discharged from the NICU, but she has been coping well. She checks her BP at home and notes normal readings. She is taking aspirin. She has decided against genetic testing with the thought that an unbalanced translocation likely would've resulted in a pregnancy loss by now.     OBJECTIVE    Visit Vitals  /78 (BP Location: Right arm, Patient Position: Sitting, BP Cuff Size: Adult)   Pulse 83   Ht 1.702 m (5' 7\")   Wt 77.3 kg (170 lb 8 oz)   BMI 26.70 kg/m²   OB Status Pregnant   Smoking Status Never   BSA 1.91 m²        FHT: see US report    ASSESSMENT & PLAN    Emelyn Perez is a 33 y.o.  at 17w2d here for the following concerns we addressed today:    Assessment & Plan  H/O pre-eclampsia in prior pregnancy, currently pregnant, second trimester ([facility])  -History of 26 week delivery due to preeclampsia with severe features and fetal growth restriction  -Normal baseline labs, APLS negative  -Taking aspirin  -Following Bps at home and is aware of signs/symptoms and recurrence risk   -Plan for additional ultrasounds and fetal surveillance in the third trimester       Delivery by classical  section ([facility])  -Planning pre-labor repeat  around 36-37 weeks        Balanced autosomal translocation in normal individual  -Opted against genetic testing during the pregnancy other than NIPT for common aneuploidy  -Normal early anatomy scan today  -Detailed anatomy scan is scheduled for 3 weeks        17 weeks gestation of pregnancy ([facility])  Desired provider in labor: [] CNM  [x] Physician  [x] Blood Products: [x] Yes, accepts [] No, needs counseling  [x] Initial BMI: 25.68   [x] Prenatal Labs: within normal limits  [x] Cervical Cancer Screening up to date: due for repeat " pap next month due to prior +HRHPV result, will perform at next visit  [x] Rh status: Rh pos  [x] Genetic Screening: see other problem  [x] NT US (11-13 wks): WNL  [x] Baby ASA (if indicated): taking  [x] Pregnancy dated by: US, no LMP          RTC in 3 weeks    Sarah Hernández MD

## 2024-10-18 NOTE — ASSESSMENT & PLAN NOTE
-Opted against genetic testing during the pregnancy other than NIPT for common aneuploidy  -Normal early anatomy scan today  -Detailed anatomy scan is scheduled for 3 weeks

## 2024-11-05 ENCOUNTER — APPOINTMENT (OUTPATIENT)
Dept: OBSTETRICS AND GYNECOLOGY | Facility: CLINIC | Age: 34
End: 2024-11-05
Payer: COMMERCIAL

## 2024-11-08 ENCOUNTER — APPOINTMENT (OUTPATIENT)
Dept: OBSTETRICS AND GYNECOLOGY | Facility: CLINIC | Age: 34
End: 2024-11-08
Payer: COMMERCIAL

## 2024-11-08 ENCOUNTER — HOSPITAL ENCOUNTER (OUTPATIENT)
Dept: RADIOLOGY | Facility: CLINIC | Age: 34
Discharge: HOME | End: 2024-11-08
Payer: COMMERCIAL

## 2024-11-08 ENCOUNTER — OFFICE VISIT (OUTPATIENT)
Dept: MATERNAL FETAL MEDICINE | Facility: CLINIC | Age: 34
End: 2024-11-08
Payer: COMMERCIAL

## 2024-11-08 VITALS
WEIGHT: 174 LBS | HEART RATE: 92 BPM | BODY MASS INDEX: 27.31 KG/M2 | SYSTOLIC BLOOD PRESSURE: 121 MMHG | DIASTOLIC BLOOD PRESSURE: 81 MMHG | HEIGHT: 67 IN

## 2024-11-08 DIAGNOSIS — Z87.42 HISTORY OF ABNORMAL CERVICAL PAP SMEAR: ICD-10-CM

## 2024-11-08 DIAGNOSIS — Z3A.20 20 WEEKS GESTATION OF PREGNANCY (HHS-HCC): ICD-10-CM

## 2024-11-08 DIAGNOSIS — Z34.91 PRENATAL CARE IN FIRST TRIMESTER (HHS-HCC): ICD-10-CM

## 2024-11-08 DIAGNOSIS — O09.292: Primary | ICD-10-CM

## 2024-11-08 DIAGNOSIS — Q95.0 BALANCED AUTOSOMAL TRANSLOCATION IN NORMAL INDIVIDUAL: ICD-10-CM

## 2024-11-08 DIAGNOSIS — Z34.90 PRENATAL CARE, ANTEPARTUM (HHS-HCC): ICD-10-CM

## 2024-11-08 LAB
POC APPEARANCE, URINE: CLEAR
POC BILIRUBIN, URINE: NEGATIVE
POC BLOOD, URINE: NEGATIVE
POC COLOR, URINE: YELLOW
POC GLUCOSE, URINE: NEGATIVE MG/DL
POC KETONES, URINE: NEGATIVE MG/DL
POC LEUKOCYTES, URINE: NEGATIVE
POC NITRITE,URINE: NEGATIVE
POC PH, URINE: 5.5 PH
POC PROTEIN, URINE: NEGATIVE MG/DL
POC SPECIFIC GRAVITY, URINE: 1.02
POC UROBILINOGEN, URINE: 0.2 EU/DL

## 2024-11-08 PROCEDURE — 0501F PRENATAL FLOW SHEET: CPT | Performed by: STUDENT IN AN ORGANIZED HEALTH CARE EDUCATION/TRAINING PROGRAM

## 2024-11-08 PROCEDURE — 3008F BODY MASS INDEX DOCD: CPT | Performed by: STUDENT IN AN ORGANIZED HEALTH CARE EDUCATION/TRAINING PROGRAM

## 2024-11-08 PROCEDURE — 81003 URINALYSIS AUTO W/O SCOPE: CPT | Mod: QW | Performed by: STUDENT IN AN ORGANIZED HEALTH CARE EDUCATION/TRAINING PROGRAM

## 2024-11-08 PROCEDURE — 76811 OB US DETAILED SNGL FETUS: CPT

## 2024-11-08 ASSESSMENT — PAIN SCALES - GENERAL: PAINLEVEL_OUTOF10: 0-NO PAIN

## 2024-11-08 NOTE — PROGRESS NOTES
"[facility] Follow-up  2024         SUBJECTIVE    HPI: Emelyn Perez is a 33 y.o.  at 20w2d here for RPNV. Denies contractions, bleeding, or LOF. Reports normal fetal movement. Patient reports that she is feeling nervous as she is approaching the time in her last pregnancy when she developed growth restriction and preeclampsia. She is checking her blood pressure at home and has normal readings except for one that was 140s/90s followed by an immediately normal one. She is still taking aspirin. She was relieved to hear that today's ultrasound was normal.     OBJECTIVE    Visit Vitals  /81 (BP Location: Right arm, Patient Position: Sitting, BP Cuff Size: Adult)   Pulse 92   Ht 1.702 m (5' 7\")   Wt 78.9 kg (174 lb)   BMI 27.25 kg/m²   OB Status Pregnant   Smoking Status Never   BSA 1.93 m²        FHT: see ultrasound report     ASSESSMENT & PLAN    Emelyn Perez is a 33 y.o.  at 20w2d here for the following concerns we addressed today:    Assessment & Plan  H/O pre-eclampsia in prior pregnancy, currently pregnant, second trimester ([facility])  -History of 26 week FGR and preeclampsia prompting delivery  -Continues on aspirin  -Normal fetal growth today, will repeat q4 weeks  -Counseled on signs/symptoms of preeclampsia        Balanced autosomal translocation in normal individual  -S/p genetic counseling       Delivery by classical  section ([facility])  -Has been counseled on uterine rupture risk and to monitor for symptoms of labor       History of abnormal cervical Pap smear  -History of ASCUS, +HRHPV, retested today  Orders:    THINPREP PAP TEST (>30)    20 weeks gestation of pregnancy ([facility])  Desired provider in labor: [] CNM  [x] Physician  [x] Blood Products: [x] Yes, accepts [] No, needs counseling  [x] Initial BMI: 25.68   [x] Prenatal Labs: within normal limits  [x] Cervical Cancer Screening up to date: collected today  [x] Rh status: Rh pos  [x] Genetic Screening: see other problem  [x] " NT US (11-13 wks): WNL  [x] Baby ASA (if indicated): taking  [x] Pregnancy dated by: US, no LMP     [x] Anatomy US: (19-20 wks): normal   [] Federal Sterilization consent signed (if indicated):  [] 1hr GCT at 24-28wks:  [] Fetal Surveillance (if indicated):  [] Tdap (27-32 wks, may be given up to 36 wks if initial window missed):   [] RSV (32-36 wks) (Sept. to end of ):   [x] Flu Vaccine: 24    [] Breastfeeding:  [] Postpartum Birth control method:   [] GBS at 36 - 37 wks:  [x] Mode of delivery ( anticipated ): repeat  section at 36-37 wks given h/o classical  section    Orders:    THINPREP PAP TEST (>30)      RTC in 4 weeks      Sarah Hernández MD

## 2024-11-08 NOTE — ASSESSMENT & PLAN NOTE
Desired provider in labor: [] CNM  [x] Physician  [x] Blood Products: [x] Yes, accepts [] No, needs counseling  [x] Initial BMI: 25.68   [x] Prenatal Labs: within normal limits  [x] Cervical Cancer Screening up to date: collected today  [x] Rh status: Rh pos  [x] Genetic Screening: see other problem  [x] NT US (11-13 wks): WNL  [x] Baby ASA (if indicated): taking  [x] Pregnancy dated by: US, no LMP     [x] Anatomy US: (19-20 wks): normal   [] Federal Sterilization consent signed (if indicated):  [] 1hr GCT at 24-28wks:  [] Fetal Surveillance (if indicated):  [] Tdap (27-32 wks, may be given up to 36 wks if initial window missed):   [] RSV (32-36 wks) (Sept. to end of ):   [x] Flu Vaccine: 24    [] Breastfeeding:  [] Postpartum Birth control method:   [] GBS at 36 - 37 wks:  [x] Mode of delivery ( anticipated ): repeat  section at 36-37 wks given h/o classical  section    Orders:    THINPREP PAP TEST (>30)

## 2024-11-08 NOTE — ASSESSMENT & PLAN NOTE
-History of 26 week FGR and preeclampsia prompting delivery  -Continues on aspirin  -Normal fetal growth today, will repeat q4 weeks  -Counseled on signs/symptoms of preeclampsia

## 2024-11-21 LAB
CYTOLOGY CMNT CVX/VAG CYTO-IMP: NORMAL
HPV HR 12 DNA GENITAL QL NAA+PROBE: POSITIVE
HPV HR GENOTYPES PNL CVX NAA+PROBE: POSITIVE
HPV16 DNA SPEC QL NAA+PROBE: NEGATIVE
HPV18 DNA SPEC QL NAA+PROBE: NEGATIVE
LAB AP HPV GENOTYPE QUESTION: YES
LAB AP HPV HR: NORMAL
LABORATORY COMMENT REPORT: NORMAL
LABORATORY COMMENT REPORT: NORMAL
PATH REPORT.TOTAL CANCER: NORMAL

## 2024-11-30 NOTE — PROGRESS NOTES
"[facility] Follow-up  2024         SUBJECTIVE    HPI: Emelyn Perez is a 33 y.o.  at 23w6d here for RPNV. She has a history of severe FGR diagnosed at 23 weeks with delivery at 26 weeks in prior pregnancy. Denies contractions, bleeding, or LOF. Reports normal fetal movement. Patient reports no headache, vision changes, chest pain or SOB.     OBJECTIVE    Visit Vitals  /83 (BP Location: Right arm, Patient Position: Sitting, BP Cuff Size: Adult)   Pulse 99   Ht 1.702 m (5' 7\")   BMI 27.25 kg/m²   OB Status Pregnant   Smoking Status Never   BSA 1.93 m²      Growth today: EFW 751g, 86%, AC 73%    ASSESSMENT & PLAN    Emelyn Perez is a 33 y.o.  at 23w6d here for the following concerns we addressed today:    23 weeks gestation of pregnancy ([facility])  - Prenatal labs reviewed. Discussed performing her 1hr OGTT and 3rd trimester labs at her next prenatal appt  - Dated by 8 wk US  - Genetics: risk reducing cfDNA, NT ultrasound wnl  - S/p flu vaccine    H/O pre-eclampsia in prior pregnancy, currently pregnant, second trimester ([facility])  -History of 26 week FGR and preeclampsia prompting delivery  -Continues on aspirin  -Normal fetal growth at anatomy -> 23w6d US: EFW 751g, 86%, AC 73% -> Will repeat q4 weeks  -Counseled on signs/symptoms of preeclampsia. Has home BP cuff  - /83 today    Delivery by classical  section ([facility])  - repeat CS in 36th week, pt aware    Balanced autosomal translocation in normal individual  -S/p genetic counseling. Risk reducing cfDNA for common aneuploidy    History of abnormal cervical Pap smear  -History of ASCUS, +HRHPV (neg 16/18)   - Colpo scheduled      Orders Placed This Encounter   Procedures    POCT UA Automated manually resulted     Order Specific Question:   Release result to [employer]     Answer:   Immediate [1]     RTC in 4 weeks    Patient seen and evaluated with Dr. Nakul Davenport MD  Fellow, Maternal-Fetal Medicine  "

## 2024-12-03 ENCOUNTER — HOSPITAL ENCOUNTER (OUTPATIENT)
Dept: RADIOLOGY | Facility: CLINIC | Age: 34
Discharge: HOME | End: 2024-12-03
Payer: COMMERCIAL

## 2024-12-03 ENCOUNTER — OFFICE VISIT (OUTPATIENT)
Dept: MATERNAL FETAL MEDICINE | Facility: CLINIC | Age: 34
End: 2024-12-03
Payer: COMMERCIAL

## 2024-12-03 VITALS
HEIGHT: 67 IN | BODY MASS INDEX: 27.25 KG/M2 | HEART RATE: 99 BPM | DIASTOLIC BLOOD PRESSURE: 83 MMHG | SYSTOLIC BLOOD PRESSURE: 134 MMHG

## 2024-12-03 DIAGNOSIS — Q95.0 BALANCED AUTOSOMAL TRANSLOCATION IN NORMAL INDIVIDUAL: ICD-10-CM

## 2024-12-03 DIAGNOSIS — Z34.91 PRENATAL CARE IN FIRST TRIMESTER (HHS-HCC): ICD-10-CM

## 2024-12-03 DIAGNOSIS — O09.292: ICD-10-CM

## 2024-12-03 DIAGNOSIS — Z3A.23 23 WEEKS GESTATION OF PREGNANCY (HHS-HCC): Primary | ICD-10-CM

## 2024-12-03 DIAGNOSIS — Z87.42 HISTORY OF ABNORMAL CERVICAL PAP SMEAR: ICD-10-CM

## 2024-12-03 PROCEDURE — 0501F PRENATAL FLOW SHEET: CPT | Performed by: STUDENT IN AN ORGANIZED HEALTH CARE EDUCATION/TRAINING PROGRAM

## 2024-12-03 PROCEDURE — 76816 OB US FOLLOW-UP PER FETUS: CPT | Performed by: MEDICAL GENETICS

## 2024-12-03 PROCEDURE — 76816 OB US FOLLOW-UP PER FETUS: CPT

## 2024-12-03 ASSESSMENT — PAIN SCALES - GENERAL: PAINLEVEL_OUTOF10: 0-NO PAIN

## 2024-12-05 PROBLEM — Z3A.23 23 WEEKS GESTATION OF PREGNANCY (HHS-HCC): Status: ACTIVE | Noted: 2024-08-19

## 2024-12-05 PROBLEM — Z87.42 HISTORY OF ABNORMAL CERVICAL PAP SMEAR: Status: ACTIVE | Noted: 2024-12-05

## 2024-12-06 NOTE — ASSESSMENT & PLAN NOTE
- Prenatal labs reviewed. Discussed performing her 1hr OGTT and 3rd trimester labs at her next prenatal appt  - Dated by 8 wk US  - Genetics: risk reducing cfDNA, NT ultrasound wnl  - S/p flu vaccine

## 2024-12-06 NOTE — ASSESSMENT & PLAN NOTE
-History of 26 week FGR and preeclampsia prompting delivery  -Continues on aspirin  -Normal fetal growth at anatomy -> 23w6d US: EFW 751g, 86%, AC 73% -> Will repeat q4 weeks  -Counseled on signs/symptoms of preeclampsia. Has home BP cuff  - /83 today

## 2024-12-18 ENCOUNTER — APPOINTMENT (OUTPATIENT)
Dept: OBSTETRICS AND GYNECOLOGY | Facility: CLINIC | Age: 34
End: 2024-12-18
Payer: COMMERCIAL

## 2024-12-18 VITALS — WEIGHT: 188 LBS | SYSTOLIC BLOOD PRESSURE: 133 MMHG | DIASTOLIC BLOOD PRESSURE: 85 MMHG | BODY MASS INDEX: 29.44 KG/M2

## 2024-12-18 DIAGNOSIS — R87.810 ASCUS WITH POSITIVE HIGH RISK HPV CERVICAL: Primary | ICD-10-CM

## 2024-12-18 DIAGNOSIS — R87.610 ASCUS WITH POSITIVE HIGH RISK HPV CERVICAL: Primary | ICD-10-CM

## 2024-12-18 DIAGNOSIS — O09.292: ICD-10-CM

## 2024-12-18 DIAGNOSIS — Z3A.23 23 WEEKS GESTATION OF PREGNANCY (HHS-HCC): ICD-10-CM

## 2024-12-18 PROCEDURE — 57452 EXAM OF CERVIX W/SCOPE: CPT | Performed by: OBSTETRICS & GYNECOLOGY

## 2024-12-18 NOTE — PROGRESS NOTES
Patient presents for a Colposcopy procedure at 26w pregnant  PAP 11/08/2024 ASC-US HPV+    MernaBRYANNA Brown    Patient ID: Emelyn Perez is a 34 y.o. female.    Colposcopy    Date/Time: 12/18/2024 9:55 AM    Performed by: Radha Abad MD  Authorized by: Radha Abad MD    Consent:     Patient questions answered: yes      Risks and benefits of the procedure and its alternatives discussed: yes      Consent obtained:  Written    Consent given by:  Patient  Pre-procedure:     Speculum was placed in the vagina: yes      Prep solution(s): acetic acid    Procedure:     Colposcopy with: colposcopy only      Biopsy taken: no      Cervix visibility: fully visualized      SCJ visibility: fully visualized      Acetowhite lesion(s): cervix      Ferric subsulfate solution applied: no      Tampon inserted: no    Post-procedure:     Patient tolerance of procedure:  Patient tolerated the procedure well with no immediate complications    Uncomplicated colposcopy for ASC-US, hpv other pos pap.  Colpo adequate, impression CIN1.  No biopsies taken.  Plan repeat colposcopy 6-12 wks postpartum.  FHTs 140.  Has next [facility] visit 1/3.  BP normal in office today.    Radha Abad MD

## 2025-01-02 NOTE — PROGRESS NOTES
Subjective   Patient ID [id number]   Emelyn Perez is a 34 y.o.  at 28w2d.  She denies vaginal bleeding, leakage of fluid, decreased fetal movements, or contractions. She is followed for history of pre-eclampsia and classical CD in her prior pregnancy.     Objective   Visit Vitals  /86 (BP Location: Right arm, Patient Position: Sitting, BP Cuff Size: Adult)   Pulse 97      Physical Exam  Weight: 86.6 kg (191 lb)  BP: 135/86         Prenatal Labs  Urine dip:  Lab Results   Component Value Date    KETONESU NEGATIVE 2024       Lab Results   Component Value Date    HGB 13.5 2024    HCT 41.8 2024    ABO O 2024    HEPBSAG Nonreactive 2024       Imaging  Normal EFW today     Assessment/Plan     H/O pre-eclampsia in prior pregnancy, currently pregnant, second trimester ([facility])  -History of 26 week FGR and preeclampsia prompting delivery  -Continues on aspirin  -Reassuring ultrasound today with follow up scheduled.   -Initial BP increased though reports significant anxiety, repeat after relaxation normal. Has been following BP at home and reports all WNL at home. Denies pre-eclampsia symptoms. Reviewed home symptom and BP monitoring, indications to call or come in. Will bring in cuff next visit for validation.     23 weeks gestation of pregnancy ([facility])  - TDAP today  - Glucola, 28 week labs today   - Dated by 8 wk US  - Genetics: risk reducing cfDNA, NT ultrasound wnl  - S/p flu vaccine  Rh +      Delivery by classical  section ([facility])  - repeat CS in 36th week, pt aware.      Balanced autosomal translocation in normal individual  -S/p genetic counseling. Risk reducing cfDNA for common aneuploidy     History of abnormal cervical Pap smear  -History of ASCUS, +HRHPV (neg 16/18)   - Colpo last week, no biopsies, follow up after delivery.    RTC 2 weeks    Sean Mota MD   Maternal Fetal Medicine

## 2025-01-03 ENCOUNTER — OFFICE VISIT (OUTPATIENT)
Dept: MATERNAL FETAL MEDICINE | Facility: CLINIC | Age: 35
End: 2025-01-03
Payer: COMMERCIAL

## 2025-01-03 ENCOUNTER — HOSPITAL ENCOUNTER (OUTPATIENT)
Dept: RADIOLOGY | Facility: CLINIC | Age: 35
Discharge: HOME | End: 2025-01-03
Payer: COMMERCIAL

## 2025-01-03 VITALS
HEART RATE: 97 BPM | SYSTOLIC BLOOD PRESSURE: 135 MMHG | BODY MASS INDEX: 29.98 KG/M2 | WEIGHT: 191 LBS | HEIGHT: 67 IN | DIASTOLIC BLOOD PRESSURE: 86 MMHG

## 2025-01-03 DIAGNOSIS — O09.292: ICD-10-CM

## 2025-01-03 DIAGNOSIS — Z34.91 PRENATAL CARE IN FIRST TRIMESTER (HHS-HCC): ICD-10-CM

## 2025-01-03 DIAGNOSIS — Z23 NEED FOR DIPHTHERIA-TETANUS-PERTUSSIS (TDAP) VACCINE: ICD-10-CM

## 2025-01-03 DIAGNOSIS — Z34.90 PRENATAL CARE, ANTEPARTUM (HHS-HCC): ICD-10-CM

## 2025-01-03 LAB
ERYTHROCYTE [DISTWIDTH] IN BLOOD BY AUTOMATED COUNT: 12.6 % (ref 11.5–14.5)
GLUCOSE 1H P 50 G GLC PO SERPL-MCNC: 126 MG/DL
HCT VFR BLD AUTO: 35.7 % (ref 36–46)
HGB BLD-MCNC: 11.7 G/DL (ref 12–16)
MCH RBC QN AUTO: 28.5 PG (ref 26–34)
MCHC RBC AUTO-ENTMCNC: 32.8 G/DL (ref 32–36)
MCV RBC AUTO: 87 FL (ref 80–100)
NRBC BLD-RTO: 0 /100 WBCS (ref 0–0)
PLATELET # BLD AUTO: 303 X10*3/UL (ref 150–450)
POC APPEARANCE, URINE: CLEAR
POC BILIRUBIN, URINE: NEGATIVE
POC BLOOD, URINE: NEGATIVE
POC COLOR, URINE: YELLOW
POC GLUCOSE, URINE: ABNORMAL MG/DL
POC KETONES, URINE: ABNORMAL MG/DL
POC LEUKOCYTES, URINE: NEGATIVE
POC NITRITE,URINE: NEGATIVE
POC PH, URINE: 6.5 PH
POC PROTEIN, URINE: NEGATIVE MG/DL
POC SPECIFIC GRAVITY, URINE: 1.01
POC UROBILINOGEN, URINE: 0.2 EU/DL
RBC # BLD AUTO: 4.1 X10*6/UL (ref 4–5.2)
REFLEX ADDED, ANEMIA PANEL: NORMAL
TREPONEMA PALLIDUM IGG+IGM AB [PRESENCE] IN SERUM OR PLASMA BY IMMUNOASSAY: NONREACTIVE
WBC # BLD AUTO: 17.5 X10*3/UL (ref 4.4–11.3)

## 2025-01-03 PROCEDURE — 0501F PRENATAL FLOW SHEET: CPT | Performed by: OBSTETRICS & GYNECOLOGY

## 2025-01-03 PROCEDURE — 90471 IMMUNIZATION ADMIN: CPT | Performed by: OBSTETRICS & GYNECOLOGY

## 2025-01-03 PROCEDURE — 76816 OB US FOLLOW-UP PER FETUS: CPT

## 2025-01-03 PROCEDURE — 3008F BODY MASS INDEX DOCD: CPT | Performed by: OBSTETRICS & GYNECOLOGY

## 2025-01-03 PROCEDURE — 85027 COMPLETE CBC AUTOMATED: CPT | Performed by: OBSTETRICS & GYNECOLOGY

## 2025-01-03 PROCEDURE — 86780 TREPONEMA PALLIDUM: CPT | Performed by: OBSTETRICS & GYNECOLOGY

## 2025-01-03 PROCEDURE — 82947 ASSAY GLUCOSE BLOOD QUANT: CPT | Performed by: OBSTETRICS & GYNECOLOGY

## 2025-01-03 PROCEDURE — 81003 URINALYSIS AUTO W/O SCOPE: CPT | Performed by: OBSTETRICS & GYNECOLOGY

## 2025-01-03 PROCEDURE — 36415 COLL VENOUS BLD VENIPUNCTURE: CPT | Performed by: OBSTETRICS & GYNECOLOGY

## 2025-01-03 PROCEDURE — 76819 FETAL BIOPHYS PROFIL W/O NST: CPT

## 2025-01-03 ASSESSMENT — EDINBURGH POSTNATAL DEPRESSION SCALE (EPDS)
I HAVE BEEN ANXIOUS OR WORRIED FOR NO GOOD REASON: NO, NOT AT ALL
I HAVE FELT SAD OR MISERABLE: NO, NOT AT ALL
THE THOUGHT OF HARMING MYSELF HAS OCCURRED TO ME: NEVER
I HAVE FELT SCARED OR PANICKY FOR NO GOOD REASON: NO, NOT AT ALL
I HAVE BEEN SO UNHAPPY THAT I HAVE HAD DIFFICULTY SLEEPING: NOT AT ALL
I HAVE BLAMED MYSELF UNNECESSARILY WHEN THINGS WENT WRONG: NO, NEVER
I HAVE BEEN SO UNHAPPY THAT I HAVE BEEN CRYING: NO, NEVER
I HAVE LOOKED FORWARD WITH ENJOYMENT TO THINGS: AS MUCH AS I EVER DID
TOTAL SCORE: 0
THINGS HAVE BEEN GETTING ON TOP OF ME: NO, I HAVE BEEN COPING AS WELL AS EVER
I HAVE BEEN ABLE TO LAUGH AND SEE THE FUNNY SIDE OF THINGS: AS MUCH AS I ALWAYS COULD

## 2025-01-03 ASSESSMENT — PAIN SCALES - GENERAL: PAINLEVEL_OUTOF10: 0-NO PAIN

## 2025-01-06 ENCOUNTER — TELEPHONE (OUTPATIENT)
Dept: MATERNAL FETAL MEDICINE | Facility: CLINIC | Age: 35
End: 2025-01-06
Payer: COMMERCIAL

## 2025-01-06 NOTE — TELEPHONE ENCOUNTER
Spoke with patient to discuss lab values from Friday's visit. Notified pt. 1 hour gtt was normal. Notified of elevated white blood cell count. Pt. Denies feeling any sickness at this time, denies any fevers or s/sx of infection. States her son has an ear infection and cold. Pt. May be fighting off illness or could be a normal elevation of pregnancy. Will continue to monitor at next visit per Dr. Mota. Pt. Verbalized understanding and denies any further questions at this time.

## 2025-01-17 ENCOUNTER — OFFICE VISIT (OUTPATIENT)
Dept: MATERNAL FETAL MEDICINE | Facility: CLINIC | Age: 35
End: 2025-01-17
Payer: COMMERCIAL

## 2025-01-17 VITALS
WEIGHT: 193 LBS | HEART RATE: 99 BPM | DIASTOLIC BLOOD PRESSURE: 83 MMHG | SYSTOLIC BLOOD PRESSURE: 125 MMHG | BODY MASS INDEX: 30.29 KG/M2 | HEIGHT: 67 IN

## 2025-01-17 DIAGNOSIS — Q95.0 BALANCED AUTOSOMAL TRANSLOCATION IN NORMAL INDIVIDUAL: ICD-10-CM

## 2025-01-17 DIAGNOSIS — Z87.42 HISTORY OF ABNORMAL CERVICAL PAP SMEAR: ICD-10-CM

## 2025-01-17 DIAGNOSIS — Z3A.30 30 WEEKS GESTATION OF PREGNANCY (HHS-HCC): ICD-10-CM

## 2025-01-17 DIAGNOSIS — R89.9 ABNORMAL LABORATORY TEST: Primary | ICD-10-CM

## 2025-01-17 DIAGNOSIS — O09.292: ICD-10-CM

## 2025-01-17 PROCEDURE — 0501F PRENATAL FLOW SHEET: CPT | Performed by: STUDENT IN AN ORGANIZED HEALTH CARE EDUCATION/TRAINING PROGRAM

## 2025-01-17 PROCEDURE — 85027 COMPLETE CBC AUTOMATED: CPT | Performed by: STUDENT IN AN ORGANIZED HEALTH CARE EDUCATION/TRAINING PROGRAM

## 2025-01-17 PROCEDURE — 3008F BODY MASS INDEX DOCD: CPT | Performed by: STUDENT IN AN ORGANIZED HEALTH CARE EDUCATION/TRAINING PROGRAM

## 2025-01-17 PROCEDURE — 81003 URINALYSIS AUTO W/O SCOPE: CPT | Mod: QW | Performed by: STUDENT IN AN ORGANIZED HEALTH CARE EDUCATION/TRAINING PROGRAM

## 2025-01-17 PROCEDURE — 36415 COLL VENOUS BLD VENIPUNCTURE: CPT | Performed by: STUDENT IN AN ORGANIZED HEALTH CARE EDUCATION/TRAINING PROGRAM

## 2025-01-17 ASSESSMENT — PAIN SCALES - GENERAL: PAINLEVEL_OUTOF10: 0-NO PAIN

## 2025-01-17 NOTE — ASSESSMENT & PLAN NOTE
-History of 26 week FGR and preeclampsia prompting delivery  -Continues on aspirin  -Normal fetal growth 58% at 28w2d, for serial growth US   -Previously counseled on signs/symptoms of preeclampsia. Has home BP cuff which was correlated today  - normotensive today and at home per report    Orders:    POCT UA Automated manually resulted

## 2025-01-17 NOTE — PROGRESS NOTES
"MFM Follow-up  2025   9:21 AM     SUBJECTIVE    HPI: Emelyn Perez is a 34 y.o.  at 30w2d here for RPNV. Denies contractions, bleeding, or LOF. Reports normal fetal movement. Patient reports no concerns today.  Noted burn on left hand which was accidental from boiling water.  No safety concerns noted.  Mild leukocytosis on CBC drawn at last visit, reports mild URI which may have contributed to that.  Blood pressures at home are normotensive.    OBJECTIVE    Visit Vitals  /83 (BP Location: Right arm, Patient Position: Sitting, BP Cuff Size: Adult)   Pulse 99   Ht 1.702 m (5' 7\")   Wt 87.5 kg (193 lb)   BMI 30.23 kg/m²   OB Status Pregnant   Smoking Status Never   BSA 2.03 m²   Gen: NAD  Pulm: normal respiratory effort  CV: regular rate, well perfused  Abd: soft, gravid, nontender  Ext: no edema  FHT: 139    ASSESSMENT & PLAN    Emelyn Perez is a 34 y.o.  at 30w2d here for the following concerns we addressed today:    Assessment & Plan  H/O pre-eclampsia in prior pregnancy, currently pregnant, second trimester ([facility])  -History of 26 week FGR and preeclampsia prompting delivery  -Continues on aspirin  -Normal fetal growth 58% at 28w2d, for serial growth US   -Previously counseled on signs/symptoms of preeclampsia. Has home BP cuff which was correlated today  - normotensive today and at home per report    Orders:    POCT UA Automated manually resulted    30 weeks gestation of pregnancy ([facility])  Prenatal labs up to date  Given mild leukocytosis will repeat CBC today, though likely due to viral URI  S/p flu and tdap       Delivery by classical  section ([facility])  Plan for RCS 37w0d unless indicated sooner  Will schedule it at next PNV       History of abnormal cervical Pap smear  -History of ASCUS, +HRHPV (neg )   - Colpo on  with no biopsies, plan for pp colpo       Balanced autosomal translocation in normal individual  -S/p genetic counseling. Risk reducing cfDNA for " common aneuploidy         RTC in 2 weeks for PNV and growth US      Mirta Egan MD

## 2025-01-17 NOTE — ASSESSMENT & PLAN NOTE
Prenatal labs up to date  Given mild leukocytosis will repeat CBC today, though likely due to viral URI  S/p flu and tdap

## 2025-01-18 LAB
ERYTHROCYTE [DISTWIDTH] IN BLOOD BY AUTOMATED COUNT: 12.6 % (ref 11.5–14.5)
HCT VFR BLD AUTO: 37.5 % (ref 36–46)
HGB BLD-MCNC: 11.6 G/DL (ref 12–16)
MCH RBC QN AUTO: 27.8 PG (ref 26–34)
MCHC RBC AUTO-ENTMCNC: 30.9 G/DL (ref 32–36)
MCV RBC AUTO: 90 FL (ref 80–100)
NRBC BLD-RTO: 0 /100 WBCS (ref 0–0)
PLATELET # BLD AUTO: 302 X10*3/UL (ref 150–450)
RBC # BLD AUTO: 4.18 X10*6/UL (ref 4–5.2)
REFLEX ADDED, ANEMIA PANEL: NORMAL
WBC # BLD AUTO: 16.7 X10*3/UL (ref 4.4–11.3)

## 2025-01-28 ENCOUNTER — HOSPITAL ENCOUNTER (OUTPATIENT)
Dept: RADIOLOGY | Facility: CLINIC | Age: 35
Discharge: HOME | End: 2025-01-28
Payer: COMMERCIAL

## 2025-01-28 ENCOUNTER — OFFICE VISIT (OUTPATIENT)
Dept: MATERNAL FETAL MEDICINE | Facility: CLINIC | Age: 35
End: 2025-01-28
Payer: COMMERCIAL

## 2025-01-28 VITALS
HEART RATE: 83 BPM | SYSTOLIC BLOOD PRESSURE: 137 MMHG | BODY MASS INDEX: 31.08 KG/M2 | HEIGHT: 67 IN | DIASTOLIC BLOOD PRESSURE: 83 MMHG | WEIGHT: 198 LBS

## 2025-01-28 DIAGNOSIS — Z34.91 PRENATAL CARE IN FIRST TRIMESTER (HHS-HCC): ICD-10-CM

## 2025-01-28 DIAGNOSIS — Z3A.31 31 WEEKS GESTATION OF PREGNANCY (HHS-HCC): ICD-10-CM

## 2025-01-28 DIAGNOSIS — Z01.812 PRE-PROCEDURE LAB EXAM: ICD-10-CM

## 2025-01-28 DIAGNOSIS — O09.292: ICD-10-CM

## 2025-01-28 DIAGNOSIS — O09.299 HISTORY OF PRE-ECLAMPSIA IN PRIOR PREGNANCY, CURRENTLY PREGNANT (HHS-HCC): ICD-10-CM

## 2025-01-28 DIAGNOSIS — Z87.42 HISTORY OF ABNORMAL CERVICAL PAP SMEAR: Primary | ICD-10-CM

## 2025-01-28 LAB
POC APPEARANCE, URINE: CLEAR
POC BILIRUBIN, URINE: NEGATIVE
POC BLOOD, URINE: NEGATIVE
POC COLOR, URINE: YELLOW
POC GLUCOSE, URINE: ABNORMAL MG/DL
POC KETONES, URINE: ABNORMAL MG/DL
POC LEUKOCYTES, URINE: NEGATIVE
POC NITRITE,URINE: NEGATIVE
POC PH, URINE: 6 PH
POC PROTEIN, URINE: NEGATIVE MG/DL
POC SPECIFIC GRAVITY, URINE: 1.02
POC UROBILINOGEN, URINE: 0.2 EU/DL

## 2025-01-28 PROCEDURE — 76816 OB US FOLLOW-UP PER FETUS: CPT | Performed by: OBSTETRICS & GYNECOLOGY

## 2025-01-28 PROCEDURE — 76816 OB US FOLLOW-UP PER FETUS: CPT

## 2025-01-28 PROCEDURE — 0501F PRENATAL FLOW SHEET: CPT | Performed by: STUDENT IN AN ORGANIZED HEALTH CARE EDUCATION/TRAINING PROGRAM

## 2025-01-28 PROCEDURE — 81003 URINALYSIS AUTO W/O SCOPE: CPT | Performed by: STUDENT IN AN ORGANIZED HEALTH CARE EDUCATION/TRAINING PROGRAM

## 2025-01-28 PROCEDURE — 3008F BODY MASS INDEX DOCD: CPT | Performed by: STUDENT IN AN ORGANIZED HEALTH CARE EDUCATION/TRAINING PROGRAM

## 2025-01-28 ASSESSMENT — PAIN SCALES - GENERAL: PAINLEVEL_OUTOF10: 0-NO PAIN

## 2025-01-28 NOTE — PROGRESS NOTES
"Jeana Perez is a 33 yo  @ 31w6d with a hx severe preeclampsia and early-onset with FGR with subsequent classical  at 26 weeks who presents for a MARITO.    Emelyn is doing well today. She denies VB or LOF. +FM. No other concerns.    O: /83 (BP Location: Right arm, Patient Position: Sitting, BP Cuff Size: Adult)   Pulse 83   Ht 1.702 m (5' 7\")   Wt 89.8 kg (198 lb)   BMI 31.01 kg/m²   Gen: NAD  Resp: nonlabored breathing  Cardiac: good peripheral perfusion  Abd: gravid  Psych: appropriate mood and affect  FHTs: +FCA on U/S    A/P: Jeana Perez is a 33 yo  @ 31w6d with a hx severe preeclampsia and early-onset with FGR with subsequent classical  at 26 weeks who presents for a MARITO    1. History of severe preeclampsia and early onset FGR  - previously discussed recurrence risk  - negative APLS labs  - baseline PreE labs wnl. P:C 0.16  - continue ldASA  - recommend serial growth ultrasounds  - growth today showed an EFW of 1779 g (28%)     2. Classical   - rCS at 36-37 weeks     4. ASCUS pos HRHPV 2023  - colposcopy performed 2024  - will need repeat colposcopy 6-12 weeks pp     5. Hx PP depression  - doing well today  - no meds and declines referral to behavioral health (feels prior depression mostly driven by situational stressors of infant in NICU)     6. 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  - previously reviewed that typical manifestation of affected pregnancy is early loss and testing options.    7. PNC  - O+, ab-, RI  - PNLs reviewed and wnl  - passed 1 hr GTT  - s/p LR cfDNA  - normal anatomy    Dispo - RTC in two weeks    Akhil Galindo MD  Maternal-Fetal Medicine  "

## 2025-02-10 NOTE — PROGRESS NOTES
"[facility] Follow-up  2025         SUBJECTIVE    HPI: Emelyn Perez is a 34 y.o.  at 33w6d here for RPNV. Denies contractions, bleeding, or LOF. Reports normal fetal movement. Patient denies any headache, vision changes, chest pain, or SOB. No RUQ or epigastric pain. She takes her BP daily at home, which was /90 yesterday, higher than her baseline. She has noticed mild bilateral lower extremity swelling.     OBJECTIVE    Visit Vitals  BP (!) 157/96 (BP Location: Right arm, Patient Position: Sitting, BP Cuff Size: Adult)   Pulse 103   Ht 1.702 m (5' 7\")   Wt 92.1 kg (203 lb)   BMI 31.79 kg/m²   OB Status Pregnant   Smoking Status Never   BSA 2.09 m²    Initial /102     FHT: 138 bpm    ASSESSMENT & PLAN    Emelyn Perez is a 34 y.o.  at 33w6d here for the following concerns we addressed today:    33 weeks gestation of pregnancy ([facility])  - Prenatal labs reviewed. 1hr OGTT 126. Hgb 11.6  - Dated by 8 wk US  - Genetics: risk reducing cfDNA, NT ultrasound wnl  - S/p flu vaccine, tdap     History of classical  section  - repeat CS at 37w0d, scheduled 3/5/25    Balanced autosomal translocation in normal individual  -S/p genetic counseling. Risk reducing cfDNA for common aneuploidy    History of abnormal cervical Pap smear  -History of ASCUS, +HRHPV (neg 16/18)   - colposcopy performed 2024  - will need repeat colposcopy 6-12 weeks pp    H/O pre-eclampsia in prior pregnancy, currently pregnant, second trimester ([facility])  -History of 26 week FGR and preeclampsia prompting delivery  -Continues on aspirin  -Normal fetal growth at anatomy  - 31w6d US EFW 1779g, 28%, AC 37%  -Counseled on signs/symptoms of preeclampsia. Has home BP cuff  -/102 -> repeat 157/96, pt asymptomatic and no proteinuria  -Due to elevation of BP above her baseline and her significant  preeclampsia history, recommend evaluation in triage to rule out severe preeclampsia. Discussed the spectrum of disease, " including gHTN, mild preE, and severe preeclampsia. Reviewed if severe, delivery is indicated at 34 weeks. If gHTN or mild preE, delivery will be at 37w0d.  -Discussed if she is discharged to home from triage, recommend prenatal visit and NST in 1 week       Orders Placed This Encounter   Procedures    POCT UA Automated manually resulted     Order Specific Question:   Release result to [employer]     Answer:   Immediate [1]       Recommend triage evaluation. If discharged, recommend RTC in 1 week for prenatal visit with NST    Patient seen and evaluated with Dr. Nakul Davenport MD  Fellow, Maternal-Fetal Medicine

## 2025-02-11 ENCOUNTER — OFFICE VISIT (OUTPATIENT)
Dept: MATERNAL FETAL MEDICINE | Facility: CLINIC | Age: 35
End: 2025-02-11
Payer: COMMERCIAL

## 2025-02-11 ENCOUNTER — HOSPITAL ENCOUNTER (OUTPATIENT)
Facility: HOSPITAL | Age: 35
Setting detail: SURGERY ADMIT
Discharge: HOME | End: 2025-02-11
Attending: OBSTETRICS & GYNECOLOGY | Admitting: OBSTETRICS & GYNECOLOGY
Payer: COMMERCIAL

## 2025-02-11 VITALS
HEIGHT: 67 IN | HEART RATE: 103 BPM | WEIGHT: 203 LBS | SYSTOLIC BLOOD PRESSURE: 157 MMHG | DIASTOLIC BLOOD PRESSURE: 96 MMHG | BODY MASS INDEX: 31.86 KG/M2

## 2025-02-11 VITALS
DIASTOLIC BLOOD PRESSURE: 92 MMHG | HEIGHT: 67 IN | SYSTOLIC BLOOD PRESSURE: 148 MMHG | WEIGHT: 203.71 LBS | TEMPERATURE: 98.1 F | RESPIRATION RATE: 18 BRPM | OXYGEN SATURATION: 99 % | BODY MASS INDEX: 31.97 KG/M2 | HEART RATE: 80 BPM

## 2025-02-11 DIAGNOSIS — O09.292: ICD-10-CM

## 2025-02-11 DIAGNOSIS — O13.3 GESTATIONAL HYPERTENSION, THIRD TRIMESTER (HHS-HCC): Primary | ICD-10-CM

## 2025-02-11 DIAGNOSIS — Z3A.33 33 WEEKS GESTATION OF PREGNANCY (HHS-HCC): ICD-10-CM

## 2025-02-11 DIAGNOSIS — Z87.42 HISTORY OF ABNORMAL CERVICAL PAP SMEAR: Primary | ICD-10-CM

## 2025-02-11 DIAGNOSIS — Z98.891 HISTORY OF CLASSICAL CESAREAN SECTION: ICD-10-CM

## 2025-02-11 DIAGNOSIS — Z34.90 PRENATAL CARE, ANTEPARTUM (HHS-HCC): ICD-10-CM

## 2025-02-11 DIAGNOSIS — O13.3 GESTATIONAL HYPERTENSION, THIRD TRIMESTER (HHS-HCC): ICD-10-CM

## 2025-02-11 LAB
ALBUMIN SERPL BCP-MCNC: 3.3 G/DL (ref 3.4–5)
ALP SERPL-CCNC: 96 U/L (ref 33–110)
ALT SERPL W P-5'-P-CCNC: 34 U/L (ref 7–45)
ANION GAP SERPL CALC-SCNC: 15 MMOL/L (ref 10–20)
AST SERPL W P-5'-P-CCNC: 32 U/L (ref 9–39)
BILIRUB SERPL-MCNC: 0.3 MG/DL (ref 0–1.2)
BUN SERPL-MCNC: 10 MG/DL (ref 6–23)
CALCIUM SERPL-MCNC: 9 MG/DL (ref 8.6–10.6)
CHLORIDE SERPL-SCNC: 105 MMOL/L (ref 98–107)
CO2 SERPL-SCNC: 21 MMOL/L (ref 21–32)
CREAT SERPL-MCNC: 0.58 MG/DL (ref 0.5–1.05)
CREAT UR-MCNC: 21.4 MG/DL (ref 20–320)
EGFRCR SERPLBLD CKD-EPI 2021: >90 ML/MIN/1.73M*2
ERYTHROCYTE [DISTWIDTH] IN BLOOD BY AUTOMATED COUNT: 12.4 % (ref 11.5–14.5)
GLUCOSE SERPL-MCNC: 81 MG/DL (ref 74–99)
HCT VFR BLD AUTO: 33.7 % (ref 36–46)
HGB BLD-MCNC: 10.9 G/DL (ref 12–16)
MCH RBC QN AUTO: 27.3 PG (ref 26–34)
MCHC RBC AUTO-ENTMCNC: 32.3 G/DL (ref 32–36)
MCV RBC AUTO: 85 FL (ref 80–100)
NRBC BLD-RTO: 0 /100 WBCS (ref 0–0)
PLATELET # BLD AUTO: 255 X10*3/UL (ref 150–450)
POC APPEARANCE, URINE: CLEAR
POC APPEARANCE, URINE: CLEAR
POC BILIRUBIN, URINE: NEGATIVE
POC BILIRUBIN, URINE: NEGATIVE
POC BLOOD, URINE: NEGATIVE
POC BLOOD, URINE: NEGATIVE
POC COLOR, URINE: YELLOW
POC COLOR, URINE: YELLOW
POC GLUCOSE, URINE: NEGATIVE MG/DL
POC GLUCOSE, URINE: NEGATIVE MG/DL
POC KETONES, URINE: NEGATIVE MG/DL
POC KETONES, URINE: NEGATIVE MG/DL
POC LEUKOCYTES, URINE: ABNORMAL
POC LEUKOCYTES, URINE: ABNORMAL
POC NITRITE,URINE: NEGATIVE
POC NITRITE,URINE: NEGATIVE
POC PH, URINE: 5.5 PH
POC PH, URINE: 6 PH
POC PROTEIN, URINE: NEGATIVE MG/DL
POC PROTEIN, URINE: NEGATIVE MG/DL
POC SPECIFIC GRAVITY, URINE: 1.01
POC SPECIFIC GRAVITY, URINE: <=1.005
POC UROBILINOGEN, URINE: 0.2 EU/DL
POC UROBILINOGEN, URINE: 0.2 EU/DL
POTASSIUM SERPL-SCNC: 3.6 MMOL/L (ref 3.5–5.3)
PROT SERPL-MCNC: 6.1 G/DL (ref 6.4–8.2)
PROT UR-ACNC: 5 MG/DL (ref 5–24)
PROT/CREAT UR: 0.23 MG/MG CREAT (ref 0–0.17)
RBC # BLD AUTO: 3.99 X10*6/UL (ref 4–5.2)
SODIUM SERPL-SCNC: 137 MMOL/L (ref 136–145)
WBC # BLD AUTO: 15.2 X10*3/UL (ref 4.4–11.3)

## 2025-02-11 PROCEDURE — 99215 OFFICE O/P EST HI 40 MIN: CPT

## 2025-02-11 PROCEDURE — 80053 COMPREHEN METABOLIC PANEL: CPT

## 2025-02-11 PROCEDURE — 81002 URINALYSIS NONAUTO W/O SCOPE: CPT

## 2025-02-11 PROCEDURE — 3008F BODY MASS INDEX DOCD: CPT | Performed by: STUDENT IN AN ORGANIZED HEALTH CARE EDUCATION/TRAINING PROGRAM

## 2025-02-11 PROCEDURE — 0501F PRENATAL FLOW SHEET: CPT | Performed by: STUDENT IN AN ORGANIZED HEALTH CARE EDUCATION/TRAINING PROGRAM

## 2025-02-11 PROCEDURE — 36415 COLL VENOUS BLD VENIPUNCTURE: CPT

## 2025-02-11 PROCEDURE — 81003 URINALYSIS AUTO W/O SCOPE: CPT | Performed by: STUDENT IN AN ORGANIZED HEALTH CARE EDUCATION/TRAINING PROGRAM

## 2025-02-11 PROCEDURE — 82570 ASSAY OF URINE CREATININE: CPT

## 2025-02-11 PROCEDURE — 85027 COMPLETE CBC AUTOMATED: CPT

## 2025-02-11 RX ORDER — HYDRALAZINE HYDROCHLORIDE 20 MG/ML
5 INJECTION INTRAMUSCULAR; INTRAVENOUS ONCE AS NEEDED
Status: DISCONTINUED | OUTPATIENT
Start: 2025-02-11 | End: 2025-02-11 | Stop reason: HOSPADM

## 2025-02-11 RX ORDER — LIDOCAINE HYDROCHLORIDE 10 MG/ML
0.5 INJECTION, SOLUTION INFILTRATION; PERINEURAL ONCE AS NEEDED
Status: DISCONTINUED | OUTPATIENT
Start: 2025-02-11 | End: 2025-02-11 | Stop reason: HOSPADM

## 2025-02-11 RX ORDER — ONDANSETRON HYDROCHLORIDE 2 MG/ML
4 INJECTION, SOLUTION INTRAVENOUS EVERY 6 HOURS PRN
Status: DISCONTINUED | OUTPATIENT
Start: 2025-02-11 | End: 2025-02-11 | Stop reason: HOSPADM

## 2025-02-11 RX ORDER — NIFEDIPINE 10 MG/1
10 CAPSULE ORAL ONCE AS NEEDED
Status: DISCONTINUED | OUTPATIENT
Start: 2025-02-11 | End: 2025-02-11 | Stop reason: HOSPADM

## 2025-02-11 RX ORDER — NAPROXEN SODIUM 220 MG/1
81 TABLET, FILM COATED ORAL DAILY
COMMUNITY

## 2025-02-11 RX ORDER — ONDANSETRON 4 MG/1
4 TABLET, FILM COATED ORAL EVERY 6 HOURS PRN
Status: DISCONTINUED | OUTPATIENT
Start: 2025-02-11 | End: 2025-02-11 | Stop reason: HOSPADM

## 2025-02-11 RX ORDER — LABETALOL HYDROCHLORIDE 5 MG/ML
20 INJECTION, SOLUTION INTRAVENOUS ONCE AS NEEDED
Status: DISCONTINUED | OUTPATIENT
Start: 2025-02-11 | End: 2025-02-11 | Stop reason: HOSPADM

## 2025-02-11 SDOH — SOCIAL STABILITY: SOCIAL INSECURITY: ARE YOU OR HAVE YOU BEEN THREATENED OR ABUSED PHYSICALLY, EMOTIONALLY, OR SEXUALLY BY ANYONE?: NO

## 2025-02-11 SDOH — HEALTH STABILITY: MENTAL HEALTH: SUICIDAL BEHAVIOR (LIFETIME): NO

## 2025-02-11 SDOH — SOCIAL STABILITY: SOCIAL INSECURITY: ABUSE SCREEN: ADULT

## 2025-02-11 SDOH — HEALTH STABILITY: MENTAL HEALTH: NON-SPECIFIC ACTIVE SUICIDAL THOUGHTS (PAST 1 MONTH): NO

## 2025-02-11 SDOH — SOCIAL STABILITY: SOCIAL INSECURITY: HAVE YOU HAD ANY THOUGHTS OF HARMING ANYONE ELSE?: NO

## 2025-02-11 SDOH — SOCIAL STABILITY: SOCIAL INSECURITY: HAVE YOU HAD THOUGHTS OF HARMING ANYONE ELSE?: NO

## 2025-02-11 SDOH — SOCIAL STABILITY: SOCIAL INSECURITY: PHYSICAL ABUSE: DENIES

## 2025-02-11 SDOH — SOCIAL STABILITY: SOCIAL INSECURITY: ARE THERE ANY APPARENT SIGNS OF INJURIES/BEHAVIORS THAT COULD BE RELATED TO ABUSE/NEGLECT?: NO

## 2025-02-11 SDOH — SOCIAL STABILITY: SOCIAL INSECURITY: DOES ANYONE TRY TO KEEP YOU FROM HAVING/CONTACTING OTHER FRIENDS OR DOING THINGS OUTSIDE YOUR HOME?: NO

## 2025-02-11 SDOH — SOCIAL STABILITY: SOCIAL INSECURITY: HAS ANYONE EVER THREATENED TO HURT YOUR FAMILY OR YOUR PETS?: NO

## 2025-02-11 SDOH — HEALTH STABILITY: MENTAL HEALTH: HAVE YOU USED ANY SUBSTANCES (CANABIS, COCAINE, HEROIN, HALLUCINOGENS, INHALANTS, ETC.) IN THE PAST 12 MONTHS?: NO

## 2025-02-11 SDOH — HEALTH STABILITY: MENTAL HEALTH: HAVE YOU USED ANY PRESCRIPTION DRUGS OTHER THAN PRESCRIBED IN THE PAST 12 MONTHS?: NO

## 2025-02-11 SDOH — SOCIAL STABILITY: SOCIAL INSECURITY: VERBAL ABUSE: DENIES

## 2025-02-11 SDOH — HEALTH STABILITY: MENTAL HEALTH: WISH TO BE DEAD (PAST 1 MONTH): NO

## 2025-02-11 SDOH — ECONOMIC STABILITY: HOUSING INSECURITY: DO YOU FEEL UNSAFE GOING BACK TO THE PLACE WHERE YOU ARE LIVING?: NO

## 2025-02-11 SDOH — HEALTH STABILITY: MENTAL HEALTH: WERE YOU ABLE TO COMPLETE ALL THE BEHAVIORAL HEALTH SCREENINGS?: YES

## 2025-02-11 SDOH — SOCIAL STABILITY: SOCIAL INSECURITY: DO YOU FEEL ANYONE HAS EXPLOITED OR TAKEN ADVANTAGE OF YOU FINANCIALLY OR OF YOUR PERSONAL PROPERTY?: NO

## 2025-02-11 ASSESSMENT — PAIN SCALES - GENERAL
PAINLEVEL_OUTOF10: 0 - NO PAIN
PAINLEVEL_OUTOF10: 0-NO PAIN
PAINLEVEL_OUTOF10: 0 - NO PAIN
PAINLEVEL_OUTOF10: 0 - NO PAIN

## 2025-02-11 ASSESSMENT — LIFESTYLE VARIABLES
HOW OFTEN DO YOU HAVE 6 OR MORE DRINKS ON ONE OCCASION: NEVER
AUDIT-C TOTAL SCORE: 0
HOW MANY STANDARD DRINKS CONTAINING ALCOHOL DO YOU HAVE ON A TYPICAL DAY: PATIENT DOES NOT DRINK
SKIP TO QUESTIONS 9-10: 1
AUDIT-C TOTAL SCORE: 0
HOW OFTEN DO YOU HAVE A DRINK CONTAINING ALCOHOL: NEVER

## 2025-02-11 ASSESSMENT — PATIENT HEALTH QUESTIONNAIRE - PHQ9
2. FEELING DOWN, DEPRESSED OR HOPELESS: NOT AT ALL
SUM OF ALL RESPONSES TO PHQ9 QUESTIONS 1 & 2: 0
1. LITTLE INTEREST OR PLEASURE IN DOING THINGS: NOT AT ALL

## 2025-02-11 NOTE — ASSESSMENT & PLAN NOTE
-History of ASCUS, +HRHPV (neg 16/18)   - colposcopy performed 12/2024  - will need repeat colposcopy 6-12 weeks pp

## 2025-02-11 NOTE — H&P
OB Triage H&P    Assessment/Plan    Emelyn Perez is a 34 y.o.  at 33w6d, AGUILAR: 3/26/2025, by Ultrasound, who presents to triage with mild-range BP.    Plan      Gestational HTN  - mild-range BP in the office @1449   - BP cycling here, all mild-range Bps for past 4 hrs   - asymptomatic  - POCT UA within normal limits, trace LE  - HELLP labs neg x1, P:C 0.23  - discussed with patient criteria of gHTN and need for delivery at 37 wks, 2x weekly  testing and weekly bloodwork. Recommended overnight observation for BP monitoring given new mild-range Bps, however patient very motivated to go home and has had severe pre-eclampsia in a prior pregnancy and understands the signs and symptoms of sPEC including BP >160/110 to report to triage. Given her understanding of risks, reasonable to go home with continued BP checks and strict return precautions    Pregnancy notables:  Hx of classical CS @26 wks iso sPEC and FGR with repeat scheduled at 37 wks on 3/5  Hx of abnormal pap smear s/p colpo - for pp colpo    IUP @33.6 wga  -Fetal monitoring reassuring  - EFW 1779g 28%, AC 37% on  US  -Good fetal movement  -Up to date on prenatal care  -Continue routine prenatal care    Dispo  - patient stable for discharge home  - return precautions discussed  - continue routine OB care    Discussed plan and reviewed with: Dr. Juno Harmon MD  PGY-1, Obstetrics and Gynecology       Pregnancy Problems (from 24 to present)       Problem Noted Diagnosed Resolved    H/O pre-eclampsia in prior pregnancy, currently pregnant, second trimester ([facility]) 2024 by Radha Abad MD  No    Priority:  Medium       Overview Addendum 2024  9:51 PM by Gabi Davenport MD     -dx at 23.5 wks, managed inpatient until 26.5 wks  -on ASA  -normal baseline labs  -prefers MFM management    Serial growth US:   23w6d US: EFW 751g, 86%, AC 73%         History of classical  section 2024 by Radha Abad MD   No    Priority:  Medium       Overview Addendum 2024  1:03 PM by Radha Abad MD     -classical  section for sPEC, FGR, breech at 26.5  -delivery 36-37 wks by repeat section         33 weeks gestation of pregnancy ([facility]) 2024 by Radha Abad MD  No    Priority:  Medium       Overview Addendum 2025  9:34 AM by Mirta Egan MD     Desired provider in labor: [] CNM  [x] Physician  [x] Blood Products: [x] Yes, accepts [] No, needs counseling  [x] Initial BMI: 25.68   [x] Prenatal Labs: within normal limits  [x] Cervical Cancer Screening up to date: collected 2024   [x] Rh status: Rh pos  [x] Genetic Screening: see other problem  [x] NT US (11-13 wks): WNL  [x] Baby ASA (if indicated): taking  [x] Pregnancy dated by: US, no LMP     [x] Anatomy US: (19-20 wks): normal   [] Federal Sterilization consent signed (if indicated):  [x] 1hr GCT at 24-28wks: nml  [] Fetal Surveillance (if indicated):  [x] Tdap (27-32 wks, may be given up to 36 wks if initial window missed):   [] RSV (32-36 wks) (Sept. to end of ):   [x] Flu Vaccine: 24    [] Breastfeeding:  [x] Postpartum Birth control method: vasectomy  [] GBS at 34 - 36 wks:  [x] Mode of delivery ( anticipated ): repeat  section at 36-37 wks given h/o classical  section                 Subjective   Good fetal movement.  Denies vaginal bleeding., Denies contractions., Denies leaking of fluid.      Was in the office today where had 2 mild-range Bps ~1500. Reports that she has been checking her Bps at home and they have been in the ~130s/80s but says that yesterday it was 143/91 however she knew she had an outpatient appointment so was going to wait until her appt today. Denies HA, vision changes, SOB, CP, or RUQ pain. Denies history of asthma or diabetes.    Pregnancy notables:  Hx of classical CS @26.5 wga iso sPEC with planned rCS at 3/5 @ 37 wks  Hx abnormal pap smear ASCUS/HPV+ s/p colpo -> pp colpo  Hx PEC -  Giovanny    Prenatal Provider MFM    OB History    Para Term  AB Living   4 1 0 1 2 1   SAB IAB Ectopic Multiple Live Births   2 0 0 0 1      # Outcome Date GA Lbr Chevy/2nd Weight Sex Type Anes PTL Lv   4 Current            3  24 26w5d  0.66 kg M CS-Classical CSE  SVETA      Complications: Hemorrhage, Uterine Atony      Name: GERONIMO GOOD      Apgar1: 2  Apgar5: 4   2 SAB            1 SAB                Past Surgical History:   Procedure Laterality Date     SECTION, CLASSIC         Social History     Tobacco Use    Smoking status: Never     Passive exposure: Never    Smokeless tobacco: Never   Substance Use Topics    Alcohol use: Not Currently       No Known Allergies    Medications Prior to Admission   Medication Sig Dispense Refill Last Dose/Taking    aspirin 81 mg chewable tablet Chew 1 tablet (81 mg) once daily.   2/10/2025 Evening    prenatal vit no.124/iron/folic (PRENATAL VITAMIN ORAL) Take by mouth.   2025     Objective     Last Vitals  Temp Pulse Resp BP MAP O2 Sat   36.6 °C (97.9 °F) 89 16 (!) 140/97 114 98 %     Blood Pressures         2025  1647             BP: 140/97             Physical Exam  General: NAD, mood appropriate  Cardiopulmonary: warm and well perfused, breathing comfortably on room air, CTAB, RRR  Abdomen: Gravid, non-tender  Extremities: Symmetric  Speculum Exam: deferred  Cervix: deferred     Fetal Monitoring  Baseline: 140/mod/+accel/-decel  Uterine Activity: No contractions seen on toco  Interpretation: Reactive    Bedside ultrasound: No    Labs in chart were reviewed.          Prenatal labs reviewed, not remarkable.

## 2025-02-11 NOTE — ASSESSMENT & PLAN NOTE
- Prenatal labs reviewed. 1hr OGTT 126. Hgb 11.6  - Dated by 8 wk US  - Genetics: risk reducing cfDNA, NT ultrasound wnl  - S/p flu vaccine, tdap

## 2025-02-11 NOTE — ASSESSMENT & PLAN NOTE
-History of 26 week FGR and preeclampsia prompting delivery  -Continues on aspirin  -Normal fetal growth at anatomy  - 31w6d US EFW 1779g, 28%, AC 37%  -Counseled on signs/symptoms of preeclampsia. Has home BP cuff  -/102 -> repeat 157/96, pt asymptomatic and no proteinuria  -Due to elevation of BP above her baseline and her significant  preeclampsia history, recommend evaluation in triage to rule out severe preeclampsia. Discussed the spectrum of disease, including gHTN, mild preE, and severe preeclampsia. Reviewed if severe, delivery is indicated at 34 weeks. If gHTN or mild preE, delivery will be at 37w0d.  -Discussed if she is discharged to home from triage, recommend prenatal visit and NST in 1 week

## 2025-02-14 DIAGNOSIS — O13.3 GESTATIONAL HYPERTENSION, THIRD TRIMESTER (HHS-HCC): ICD-10-CM

## 2025-02-15 NOTE — PROGRESS NOTES
"[facility] Follow-up  2025       SUBJECTIVE    HPI: Emelyn Perez is a 34 y.o.  at 34w6d here for RPNV. Denies contractions, bleeding, or LOF. Reports normal fetal movement. Patient denies any headache, vision change, chest pain, SOB, epigastric/RUQ pain.     OBJECTIVE    Visit Vitals  /82   Pulse 98   Ht 1.702 m (5' 7\")   Wt 93 kg (205 lb)   BMI 32.11 kg/m²   OB Status Pregnant   Smoking Status Never   BSA 2.1 m²   Repeat manual 142/82      - BP lo-136 / 88-90    NST today: reactive and reassuring    ASSESSMENT & PLAN    Emelyn Perez is a 34 y.o.  at 34w6d here for the following concerns we addressed today:    H/O pre-eclampsia in prior pregnancy, currently pregnant, second trimester ([facility])  -History of 26 week FGR and preeclampsia prompting delivery  -Continues on aspirin  -Normal fetal growth at anatomy  - 31w6d US EFW 1779g, 28%, AC 37%    Gestational hypertension, third trimester ([facility])  - Dx at 33wks with MRBP > 4H apart with normal labs  - Home Bps: 121-136 / 88-90, taking twice daily  - BP today 142/82   - She is asymptomatic. 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 Risk reducing cfDNA for common aneuploidy    History of abnormal cervical Pap smear  -History of ASCUS, +HRHPV (neg 16/18)   - colposcopy performed 2024  - will need repeat colposcopy 6-12 weeks pp    34 weeks gestation of pregnancy ([facility])  - Prenatal labs reviewed. 1hr OGTT 126. Hgb 11.6  - Dated by 8 wk US  - Genetics: risk reducing cfDNA, NT ultrasound wnl  - S/p flu vaccine, tdap   - GBS obtained today  - BCM plan: partner vasectomy     Orders Placed This Encounter   Procedures    Group B Streptococcus (GBS) Prenatal Screen, Culture "     Order Specific Question:   SOURCE:     Answer:   vaginal/rectal     Order Specific Question:   Release result to MyChart     Answer:   Immediate [1]    CBC     Order Specific Question:   Release result to MyChart     Answer:   Immediate [1]    Comprehensive Metabolic Panel     Order Specific Question:   Release result to MyChart     Answer:   Immediate [1]    Protein, Urine Random     Order Specific Question:   Release result to MyChart     Answer:   Immediate [1]    POCT UA Automated manually resulted     Order Specific Question:   Release result to MyChart     Answer:   Immediate [1]      RTC in 1 week    Patient seen and evaluated with Dr. Edgar Davenport MD  Fellow, Maternal-Fetal Medicine

## 2025-02-15 NOTE — ASSESSMENT & PLAN NOTE
- Dx at 33wks with MRBP > 4H apart with normal labs  - Home Bps: 121-136 / 88-90, taking twice daily  - BP today 142/82   - She is asymptomatic. Provided strict preE precautions  - For weekly PIH labs, 2x weekly  testing, and weekly office visits  - Delivery at 37w0d, scheduled

## 2025-02-15 NOTE — ASSESSMENT & PLAN NOTE
-History of 26 week FGR and preeclampsia prompting delivery  -Continues on aspirin  -Normal fetal growth at anatomy  - 31w6d US EFW 1779g, 28%, AC 37%

## 2025-02-18 ENCOUNTER — OFFICE VISIT (OUTPATIENT)
Dept: MATERNAL FETAL MEDICINE | Facility: CLINIC | Age: 35
End: 2025-02-18
Payer: COMMERCIAL

## 2025-02-18 ENCOUNTER — PROCEDURE VISIT (OUTPATIENT)
Dept: MATERNAL FETAL MEDICINE | Facility: CLINIC | Age: 35
End: 2025-02-18
Payer: COMMERCIAL

## 2025-02-18 VITALS
HEIGHT: 67 IN | HEART RATE: 98 BPM | DIASTOLIC BLOOD PRESSURE: 82 MMHG | WEIGHT: 205 LBS | SYSTOLIC BLOOD PRESSURE: 142 MMHG | BODY MASS INDEX: 32.18 KG/M2

## 2025-02-18 DIAGNOSIS — O13.3 GESTATIONAL HYPERTENSION, THIRD TRIMESTER (HHS-HCC): ICD-10-CM

## 2025-02-18 DIAGNOSIS — O09.292: ICD-10-CM

## 2025-02-18 DIAGNOSIS — O13.3 GESTATIONAL HYPERTENSION, THIRD TRIMESTER (HHS-HCC): Primary | ICD-10-CM

## 2025-02-18 DIAGNOSIS — Z87.42 HISTORY OF ABNORMAL CERVICAL PAP SMEAR: ICD-10-CM

## 2025-02-18 DIAGNOSIS — Z3A.34 34 WEEKS GESTATION OF PREGNANCY (HHS-HCC): ICD-10-CM

## 2025-02-18 DIAGNOSIS — Z98.891 HISTORY OF CLASSICAL CESAREAN SECTION: ICD-10-CM

## 2025-02-18 LAB
POC APPEARANCE, URINE: CLEAR
POC BILIRUBIN, URINE: NEGATIVE
POC BLOOD, URINE: NEGATIVE
POC COLOR, URINE: YELLOW
POC GLUCOSE, URINE: NEGATIVE MG/DL
POC KETONES, URINE: NEGATIVE MG/DL
POC LEUKOCYTES, URINE: NEGATIVE
POC NITRITE,URINE: NEGATIVE
POC PH, URINE: 6 PH
POC PROTEIN, URINE: ABNORMAL MG/DL
POC SPECIFIC GRAVITY, URINE: 1.01
POC UROBILINOGEN, URINE: 0.2 EU/DL

## 2025-02-18 PROCEDURE — 59025 FETAL NON-STRESS TEST: CPT | Performed by: STUDENT IN AN ORGANIZED HEALTH CARE EDUCATION/TRAINING PROGRAM

## 2025-02-18 PROCEDURE — 3077F SYST BP >= 140 MM HG: CPT | Performed by: STUDENT IN AN ORGANIZED HEALTH CARE EDUCATION/TRAINING PROGRAM

## 2025-02-18 PROCEDURE — 0501F PRENATAL FLOW SHEET: CPT | Performed by: STUDENT IN AN ORGANIZED HEALTH CARE EDUCATION/TRAINING PROGRAM

## 2025-02-18 PROCEDURE — 3079F DIAST BP 80-89 MM HG: CPT | Performed by: STUDENT IN AN ORGANIZED HEALTH CARE EDUCATION/TRAINING PROGRAM

## 2025-02-18 PROCEDURE — 81003 URINALYSIS AUTO W/O SCOPE: CPT | Performed by: STUDENT IN AN ORGANIZED HEALTH CARE EDUCATION/TRAINING PROGRAM

## 2025-02-18 PROCEDURE — 3008F BODY MASS INDEX DOCD: CPT | Performed by: STUDENT IN AN ORGANIZED HEALTH CARE EDUCATION/TRAINING PROGRAM

## 2025-02-18 ASSESSMENT — PAIN SCALES - GENERAL: PAINLEVEL_OUTOF10: 0-NO PAIN

## 2025-02-18 NOTE — PROCEDURES
Emelyn Perez, a  at 34w6d with an AGUILAR of 3/26/2025, by Ultrasound, was seen at [facility] for a nonstress test.    Non-Stress Test   Baseline Fetal Heart Rate for Non-Stress Test: 120 BPM  Variability in Waveform for Non-Stress Test: Moderate  Accelerations in Non-Stress Test: Yes  Decelerations in Non-Stress Test: None  Contractions in Non-Stress Test: Not present  Interpretation of Non-Stress Test   Interpretation of Non-Stress Test: Reactive

## 2025-02-18 NOTE — PROGRESS NOTES
Pt here at 34w6d for nst for ghtn, hx pec.  Pt endorses +fm.  NST is reactive and read by AP.  Pec labs drawn today.

## 2025-02-18 NOTE — ASSESSMENT & PLAN NOTE
- Prenatal labs reviewed. 1hr OGTT 126. Hgb 11.6  - Dated by 8 wk US  - Genetics: risk reducing cfDNA, NT ultrasound wnl  - S/p flu vaccine, tdap   - GBS obtained today  - BCM plan: partner vasectomy

## 2025-02-19 ENCOUNTER — TELEPHONE (OUTPATIENT)
Dept: RADIOLOGY | Facility: CLINIC | Age: 35
End: 2025-02-19
Payer: COMMERCIAL

## 2025-02-19 NOTE — TELEPHONE ENCOUNTER
Spoke with patient regarding lab results.  Reviewed ALT results, although it was flagged elevated, it is actually lower than previous result.  Patient noted that reference ranges were different, labs were completed at different labs.  Patient denies any worsening symptoms, message sent to Dr. Davenport, no changes to plan at this time, will get repeat labs next week.

## 2025-02-19 NOTE — TELEPHONE ENCOUNTER
PT called wanting to review lab results. States it is not urgent and can wait for RN tomorrow if needed.

## 2025-02-21 ENCOUNTER — HOSPITAL ENCOUNTER (OUTPATIENT)
Dept: RADIOLOGY | Facility: CLINIC | Age: 35
Discharge: HOME | End: 2025-02-21
Payer: COMMERCIAL

## 2025-02-21 DIAGNOSIS — O13.3 GESTATIONAL HYPERTENSION, THIRD TRIMESTER (HHS-HCC): ICD-10-CM

## 2025-02-21 DIAGNOSIS — Z34.91 PRENATAL CARE IN FIRST TRIMESTER (HHS-HCC): ICD-10-CM

## 2025-02-21 PROBLEM — O14.00: Status: ACTIVE | Noted: 2025-02-11

## 2025-02-21 LAB
ALBUMIN SERPL-MCNC: 3.4 G/DL (ref 3.6–5.1)
ALP SERPL-CCNC: 103 U/L (ref 31–125)
ALT SERPL-CCNC: 30 U/L (ref 6–29)
ANION GAP SERPL CALCULATED.4IONS-SCNC: 9 MMOL/L (CALC) (ref 7–17)
AST SERPL-CCNC: 28 U/L (ref 10–30)
BILIRUB SERPL-MCNC: 0.2 MG/DL (ref 0.2–1.2)
BUN SERPL-MCNC: 14 MG/DL (ref 7–25)
CALCIUM SERPL-MCNC: 9.2 MG/DL (ref 8.6–10.2)
CHLORIDE SERPL-SCNC: 106 MMOL/L (ref 98–110)
CO2 SERPL-SCNC: 22 MMOL/L (ref 20–32)
CREAT SERPL-MCNC: 0.65 MG/DL (ref 0.5–0.97)
CREAT UR-MCNC: 60 MG/DL (ref 20–275)
EGFRCR SERPLBLD CKD-EPI 2021: 118 ML/MIN/1.73M2
ERYTHROCYTE [DISTWIDTH] IN BLOOD BY AUTOMATED COUNT: 12 % (ref 11–15)
GLUCOSE SERPL-MCNC: 79 MG/DL (ref 65–99)
GP B STREP SPEC QL CULT: NORMAL
HCT VFR BLD AUTO: 33.3 % (ref 35–45)
HGB BLD-MCNC: 10.8 G/DL (ref 11.7–15.5)
MCH RBC QN AUTO: 27.6 PG (ref 27–33)
MCHC RBC AUTO-ENTMCNC: 32.4 G/DL (ref 32–36)
MCV RBC AUTO: 85.2 FL (ref 80–100)
PLATELET # BLD AUTO: 233 THOUSAND/UL (ref 140–400)
PMV BLD REES-ECKER: 13.7 FL (ref 7.5–12.5)
POTASSIUM SERPL-SCNC: 4.2 MMOL/L (ref 3.5–5.3)
PROT SERPL-MCNC: 6.2 G/DL (ref 6.1–8.1)
PROT UR-MCNC: 20 MG/DL (ref 5–24)
PROT/CREAT UR: 0.33 MG/MG CREAT (ref 0.02–0.18)
PROT/CREAT UR: 333 MG/G CREAT (ref 24–184)
RBC # BLD AUTO: 3.91 MILLION/UL (ref 3.8–5.1)
SODIUM SERPL-SCNC: 137 MMOL/L (ref 135–146)
WBC # BLD AUTO: 16.7 THOUSAND/UL (ref 3.8–10.8)

## 2025-02-21 PROCEDURE — 76816 OB US FOLLOW-UP PER FETUS: CPT

## 2025-02-25 ENCOUNTER — OFFICE VISIT (OUTPATIENT)
Dept: MATERNAL FETAL MEDICINE | Facility: CLINIC | Age: 35
End: 2025-02-25
Payer: COMMERCIAL

## 2025-02-25 ENCOUNTER — HOSPITAL ENCOUNTER (OUTPATIENT)
Dept: RADIOLOGY | Facility: CLINIC | Age: 35
Discharge: HOME | End: 2025-02-25
Payer: COMMERCIAL

## 2025-02-25 VITALS
DIASTOLIC BLOOD PRESSURE: 97 MMHG | BODY MASS INDEX: 33.27 KG/M2 | HEIGHT: 67 IN | SYSTOLIC BLOOD PRESSURE: 157 MMHG | WEIGHT: 212 LBS | HEART RATE: 89 BPM

## 2025-02-25 DIAGNOSIS — Z3A.35 35 WEEKS GESTATION OF PREGNANCY (HHS-HCC): ICD-10-CM

## 2025-02-25 DIAGNOSIS — Q95.0 BALANCED AUTOSOMAL TRANSLOCATION IN NORMAL INDIVIDUAL: ICD-10-CM

## 2025-02-25 DIAGNOSIS — Z98.891 HISTORY OF CLASSICAL CESAREAN SECTION: ICD-10-CM

## 2025-02-25 DIAGNOSIS — O14.00: Primary | ICD-10-CM

## 2025-02-25 DIAGNOSIS — O14.90 PRE-ECLAMPSIA AFFECTING PREGNANCY, ANTEPARTUM (HHS-HCC): ICD-10-CM

## 2025-02-25 DIAGNOSIS — Z34.91 PRENATAL CARE IN FIRST TRIMESTER (HHS-HCC): ICD-10-CM

## 2025-02-25 DIAGNOSIS — O09.292: ICD-10-CM

## 2025-02-25 DIAGNOSIS — Z87.42 HISTORY OF ABNORMAL CERVICAL PAP SMEAR: ICD-10-CM

## 2025-02-25 LAB
ALBUMIN SERPL BCP-MCNC: 3.5 G/DL (ref 3.4–5)
ALP SERPL-CCNC: 101 U/L (ref 33–110)
ALT SERPL W P-5'-P-CCNC: 33 U/L (ref 7–45)
ANION GAP SERPL CALC-SCNC: 13 MMOL/L (ref 10–20)
AST SERPL W P-5'-P-CCNC: 33 U/L (ref 9–39)
BILIRUB SERPL-MCNC: 0.3 MG/DL (ref 0–1.2)
BUN SERPL-MCNC: 12 MG/DL (ref 6–23)
CALCIUM SERPL-MCNC: 9 MG/DL (ref 8.6–10.3)
CHLORIDE SERPL-SCNC: 102 MMOL/L (ref 98–107)
CO2 SERPL-SCNC: 22 MMOL/L (ref 21–32)
CREAT SERPL-MCNC: 0.6 MG/DL (ref 0.5–1.05)
EGFRCR SERPLBLD CKD-EPI 2021: >90 ML/MIN/1.73M*2
ERYTHROCYTE [DISTWIDTH] IN BLOOD BY AUTOMATED COUNT: 13.5 % (ref 11.5–14.5)
GLUCOSE SERPL-MCNC: 70 MG/DL (ref 74–99)
HCT VFR BLD AUTO: 33.6 % (ref 36–46)
HGB BLD-MCNC: 10.7 G/DL (ref 12–16)
MCH RBC QN AUTO: 27.3 PG (ref 26–34)
MCHC RBC AUTO-ENTMCNC: 31.8 G/DL (ref 32–36)
MCV RBC AUTO: 86 FL (ref 80–100)
NRBC BLD-RTO: 0 /100 WBCS (ref 0–0)
PLATELET # BLD AUTO: 233 X10*3/UL (ref 150–450)
POC APPEARANCE, URINE: CLEAR
POC BILIRUBIN, URINE: NEGATIVE
POC BLOOD, URINE: NEGATIVE
POC COLOR, URINE: YELLOW
POC GLUCOSE, URINE: NEGATIVE MG/DL
POC KETONES, URINE: NEGATIVE MG/DL
POC LEUKOCYTES, URINE: NEGATIVE
POC NITRITE,URINE: NEGATIVE
POC PH, URINE: 5.5 PH
POC PROTEIN, URINE: NEGATIVE MG/DL
POC SPECIFIC GRAVITY, URINE: <=1.005
POC UROBILINOGEN, URINE: 0.2 EU/DL
POTASSIUM SERPL-SCNC: 4 MMOL/L (ref 3.5–5.3)
PROT SERPL-MCNC: 6.1 G/DL (ref 6.4–8.2)
RBC # BLD AUTO: 3.92 X10*6/UL (ref 4–5.2)
SODIUM SERPL-SCNC: 133 MMOL/L (ref 136–145)
WBC # BLD AUTO: 15.8 X10*3/UL (ref 4.4–11.3)

## 2025-02-25 PROCEDURE — 82607 VITAMIN B-12: CPT | Performed by: STUDENT IN AN ORGANIZED HEALTH CARE EDUCATION/TRAINING PROGRAM

## 2025-02-25 PROCEDURE — 85027 COMPLETE CBC AUTOMATED: CPT | Performed by: STUDENT IN AN ORGANIZED HEALTH CARE EDUCATION/TRAINING PROGRAM

## 2025-02-25 PROCEDURE — 0501F PRENATAL FLOW SHEET: CPT | Performed by: STUDENT IN AN ORGANIZED HEALTH CARE EDUCATION/TRAINING PROGRAM

## 2025-02-25 PROCEDURE — 76815 OB US LIMITED FETUS(S): CPT | Performed by: MEDICAL GENETICS

## 2025-02-25 PROCEDURE — 36415 COLL VENOUS BLD VENIPUNCTURE: CPT | Performed by: STUDENT IN AN ORGANIZED HEALTH CARE EDUCATION/TRAINING PROGRAM

## 2025-02-25 PROCEDURE — 82728 ASSAY OF FERRITIN: CPT | Performed by: STUDENT IN AN ORGANIZED HEALTH CARE EDUCATION/TRAINING PROGRAM

## 2025-02-25 PROCEDURE — 3077F SYST BP >= 140 MM HG: CPT | Performed by: STUDENT IN AN ORGANIZED HEALTH CARE EDUCATION/TRAINING PROGRAM

## 2025-02-25 PROCEDURE — 3080F DIAST BP >= 90 MM HG: CPT | Performed by: STUDENT IN AN ORGANIZED HEALTH CARE EDUCATION/TRAINING PROGRAM

## 2025-02-25 PROCEDURE — 81003 URINALYSIS AUTO W/O SCOPE: CPT | Performed by: STUDENT IN AN ORGANIZED HEALTH CARE EDUCATION/TRAINING PROGRAM

## 2025-02-25 PROCEDURE — 76815 OB US LIMITED FETUS(S): CPT

## 2025-02-25 PROCEDURE — 83550 IRON BINDING TEST: CPT | Performed by: STUDENT IN AN ORGANIZED HEALTH CARE EDUCATION/TRAINING PROGRAM

## 2025-02-25 PROCEDURE — 82746 ASSAY OF FOLIC ACID SERUM: CPT | Performed by: STUDENT IN AN ORGANIZED HEALTH CARE EDUCATION/TRAINING PROGRAM

## 2025-02-25 PROCEDURE — 3008F BODY MASS INDEX DOCD: CPT | Performed by: STUDENT IN AN ORGANIZED HEALTH CARE EDUCATION/TRAINING PROGRAM

## 2025-02-25 PROCEDURE — 76819 FETAL BIOPHYS PROFIL W/O NST: CPT | Performed by: MEDICAL GENETICS

## 2025-02-25 PROCEDURE — 80053 COMPREHEN METABOLIC PANEL: CPT | Performed by: STUDENT IN AN ORGANIZED HEALTH CARE EDUCATION/TRAINING PROGRAM

## 2025-02-25 PROCEDURE — 76819 FETAL BIOPHYS PROFIL W/O NST: CPT

## 2025-02-25 ASSESSMENT — PAIN SCALES - GENERAL: PAINLEVEL_OUTOF10: 0-NO PAIN

## 2025-02-25 NOTE — ASSESSMENT & PLAN NOTE
-History of 26 week FGR and preeclampsia prompting delivery  -Continues on aspirin  -Normal fetal growth at anatomy  - 35wk US: EFW 2252g, 12%ile, AC 28%ile

## 2025-02-25 NOTE — ASSESSMENT & PLAN NOTE
- Dx at 33wks with MRBP > 4H apart with normal labs. Elevated P:C 0.33 at 35wks  - Home Bps: 138-143/84-91, taking twice daily, stable  - BP today 157/89, which is above her baseline at home, this is typical for all of her office visits  - She is asymptomatic today. Provided strict preE precautions for symptoms and Bps >150 / 100 at home. She expressed understanding  - Plan: continue weekly PIH labs and 2x weekly  testing (scheduled)  - Delivery at 37w0d (scheduled)

## 2025-02-25 NOTE — PROGRESS NOTES
"[facility] Follow-up  2025         SUBJECTIVE    HPI: Emelyn Perez is a 34 y.o.  at 35w6d here for RPNV. Denies contractions, bleeding, or LOF. Reports normal fetal movement. Patient denies headaches, vision changes, chest pain, SOB, RUQ/epigastric pain. Her home Bps are 138-143/84-91.     OBJECTIVE    Visit Vitals  BP (!) 157/97 (BP Location: Right arm, Patient Position: Sitting, BP Cuff Size: Adult)   Pulse 89   Ht 1.702 m (5' 7\")   Wt 96.2 kg (212 lb)   BMI 33.20 kg/m²   OB Status Pregnant   Smoking Status Never   BSA 2.13 m²     BPP  on US today    ASSESSMENT & PLAN    Emelyn Perez is a 34 y.o.  at 35w6d here for the following concerns we addressed today:    H/O pre-eclampsia in prior pregnancy, currently pregnant, second trimester ([facility])  -History of 26 week FGR and preeclampsia prompting delivery  -Continues on aspirin  -Normal fetal growth at anatomy  - 35wk US: EFW 2252g, 12%ile, AC 28%ile    Antepartum mild preeclampsia ([facility])  - Dx at 33wks with MRBP > 4H apart with normal labs. Elevated P:C 0.33 at 35wks  - Home Bps: 138-143/84-91, taking twice daily, stable  - BP today 157/89, which is above her baseline at home, this is typical for all of her office visits  - She is asymptomatic today. Provided strict preE precautions for symptoms and Bps >150 / 100 at home. 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 Risk reducing cfDNA for common aneuploidy    History of abnormal cervical Pap smear  -History of ASCUS, +HRHPV (neg 16/18)   - colposcopy performed 2024  - will need repeat colposcopy 6-12 weeks pp    35 weeks gestation of pregnancy ([facility])  - Prenatal labs " reviewed. 1hr OGTT 126. Hgb 11.6  - Dated by 8 wk US  - Genetics: risk reducing cfDNA, NT ultrasound wnl  - S/p flu vaccine, tdap   - GBS neg  - BCM plan: partner vasectomy       Orders Placed This Encounter   Procedures    CBC Anemia Panel With Reflex,Pregnancy     Order Specific Question:   Release result to MyChart     Answer:   Immediate [1]    Comprehensive Metabolic Panel     Order Specific Question:   Release result to MyChart     Answer:   Immediate [1]    CBC     Order Specific Question:   Release result to MyChart     Answer:   Immediate [1]    CBC Anemia Panel with Reflex, Pregnancy     Order Specific Question:   Release result to MyChart     Answer:   Immediate [1]    POCT UA Automated manually resulted     Order Specific Question:   Release result to MyChart     Answer:   Immediate [1]      Patient seen and evaluated with Dr. Josie Davenport MD  Fellow, Maternal-Fetal Medicine

## 2025-02-25 NOTE — ASSESSMENT & PLAN NOTE
- repeat CS at 37w0d, scheduled 3/5/25  - Pre-admission labs ordered and to be drawn next Tuesday

## 2025-02-25 NOTE — ASSESSMENT & PLAN NOTE
- Prenatal labs reviewed. 1hr OGTT 126. Hgb 11.6  - Dated by 8 wk US  - Genetics: risk reducing cfDNA, NT ultrasound wnl  - S/p flu vaccine, tdap   - GBS neg  - BCM plan: partner vasectomy

## 2025-02-26 LAB
FERRITIN SERPL-MCNC: 17 NG/ML
FOLATE SERPL-MCNC: 22.7 NG/ML
IRON SATN MFR SERPL: NORMAL %
IRON SERPL-MCNC: 38 UG/DL
REFLEX ADDED, ANEMIA PANEL: NORMAL
TIBC SERPL-MCNC: NORMAL UG/DL
UIBC SERPL-MCNC: >450 UG/DL
VIT B12 SERPL-MCNC: 280 PG/ML

## 2025-02-28 ENCOUNTER — PROCEDURE VISIT (OUTPATIENT)
Dept: MATERNAL FETAL MEDICINE | Facility: CLINIC | Age: 35
End: 2025-02-28
Payer: COMMERCIAL

## 2025-02-28 VITALS — SYSTOLIC BLOOD PRESSURE: 148 MMHG | DIASTOLIC BLOOD PRESSURE: 82 MMHG | HEART RATE: 93 BPM

## 2025-02-28 DIAGNOSIS — O14.00: Primary | ICD-10-CM

## 2025-02-28 DIAGNOSIS — O13.3 GESTATIONAL HYPERTENSION, THIRD TRIMESTER (HHS-HCC): ICD-10-CM

## 2025-02-28 DIAGNOSIS — Z3A.36 36 WEEKS GESTATION OF PREGNANCY (HHS-HCC): ICD-10-CM

## 2025-02-28 PROCEDURE — 59025 FETAL NON-STRESS TEST: CPT | Performed by: STUDENT IN AN ORGANIZED HEALTH CARE EDUCATION/TRAINING PROGRAM

## 2025-02-28 NOTE — SIGNIFICANT EVENT
02/28/25 1638   Non-Stress Test    Baseline Fetal Heart Rate for Non-Stress Test 130 BPM   Variability in Waveform for Non-Stress Test 6-25 BPM   Accelerations in Non-Stress Test Yes   Decelerations in Non-Stress Test None   Contractions in Non-Stress Test Irregular   Interpretation of Non-Stress Test    Interpretation of Non-Stress Test Reactive     NST for PreE without SF.    Akhil Galindo MD  Maternal-Fetal Medicine

## 2025-03-04 ENCOUNTER — HOSPITAL ENCOUNTER (OUTPATIENT)
Dept: RADIOLOGY | Facility: CLINIC | Age: 35
Discharge: HOME | End: 2025-03-04
Payer: COMMERCIAL

## 2025-03-04 ENCOUNTER — CLINICAL SUPPORT (OUTPATIENT)
Dept: MATERNAL FETAL MEDICINE | Facility: CLINIC | Age: 35
End: 2025-03-04
Payer: COMMERCIAL

## 2025-03-04 ENCOUNTER — LAB REQUISITION (OUTPATIENT)
Dept: LAB | Facility: HOSPITAL | Age: 35
End: 2025-03-04

## 2025-03-04 DIAGNOSIS — O14.00: ICD-10-CM

## 2025-03-04 DIAGNOSIS — Z34.91 PRENATAL CARE IN FIRST TRIMESTER (HHS-HCC): ICD-10-CM

## 2025-03-04 LAB
ABO GROUP (TYPE) IN BLOOD: NORMAL
ALBUMIN SERPL BCP-MCNC: 3.2 G/DL (ref 3.4–5)
ALP SERPL-CCNC: 100 U/L (ref 33–110)
ALT SERPL W P-5'-P-CCNC: 34 U/L (ref 7–45)
ANION GAP SERPL CALC-SCNC: 12 MMOL/L (ref 10–20)
ANTIBODY SCREEN: NORMAL
AST SERPL W P-5'-P-CCNC: 40 U/L (ref 9–39)
BASOPHILS # BLD AUTO: 0.04 X10*3/UL (ref 0–0.1)
BASOPHILS NFR BLD AUTO: 0.3 %
BILIRUB SERPL-MCNC: 0.3 MG/DL (ref 0–1.2)
BUN SERPL-MCNC: 14 MG/DL (ref 6–23)
CALCIUM SERPL-MCNC: 8.8 MG/DL (ref 8.6–10.3)
CHLORIDE SERPL-SCNC: 105 MMOL/L (ref 98–107)
CO2 SERPL-SCNC: 22 MMOL/L (ref 21–32)
CREAT SERPL-MCNC: 0.77 MG/DL (ref 0.5–1.05)
EGFRCR SERPLBLD CKD-EPI 2021: >90 ML/MIN/1.73M*2
EOSINOPHIL # BLD AUTO: 0.12 X10*3/UL (ref 0–0.7)
EOSINOPHIL NFR BLD AUTO: 0.8 %
ERYTHROCYTE [DISTWIDTH] IN BLOOD BY AUTOMATED COUNT: 13.2 % (ref 11.5–14.5)
GLUCOSE SERPL-MCNC: 81 MG/DL (ref 74–99)
HCT VFR BLD AUTO: 33.6 % (ref 36–46)
HGB BLD-MCNC: 10.6 G/DL (ref 12–16)
IMM GRANULOCYTES # BLD AUTO: 0.19 X10*3/UL (ref 0–0.7)
IMM GRANULOCYTES NFR BLD AUTO: 1.2 % (ref 0–0.9)
LYMPHOCYTES # BLD AUTO: 2.31 X10*3/UL (ref 1.2–4.8)
LYMPHOCYTES NFR BLD AUTO: 14.6 %
MCH RBC QN AUTO: 26.8 PG (ref 26–34)
MCHC RBC AUTO-ENTMCNC: 31.5 G/DL (ref 32–36)
MCV RBC AUTO: 85 FL (ref 80–100)
MONOCYTES # BLD AUTO: 1.02 X10*3/UL (ref 0.1–1)
MONOCYTES NFR BLD AUTO: 6.5 %
NEUTROPHILS # BLD AUTO: 12.09 X10*3/UL (ref 1.2–7.7)
NEUTROPHILS NFR BLD AUTO: 76.6 %
NRBC BLD-RTO: 0 /100 WBCS (ref 0–0)
PLATELET # BLD AUTO: 210 X10*3/UL (ref 150–450)
POTASSIUM SERPL-SCNC: 4.2 MMOL/L (ref 3.5–5.3)
PROT SERPL-MCNC: 5.8 G/DL (ref 6.4–8.2)
RBC # BLD AUTO: 3.96 X10*6/UL (ref 4–5.2)
RH FACTOR (ANTIGEN D): NORMAL
SODIUM SERPL-SCNC: 135 MMOL/L (ref 136–145)
WBC # BLD AUTO: 15.8 X10*3/UL (ref 4.4–11.3)

## 2025-03-04 PROCEDURE — 36415 COLL VENOUS BLD VENIPUNCTURE: CPT

## 2025-03-04 PROCEDURE — 76815 OB US LIMITED FETUS(S): CPT

## 2025-03-04 PROCEDURE — 76819 FETAL BIOPHYS PROFIL W/O NST: CPT

## 2025-03-04 PROCEDURE — 86923 COMPATIBILITY TEST ELECTRIC: CPT

## 2025-03-04 PROCEDURE — 86901 BLOOD TYPING SEROLOGIC RH(D): CPT

## 2025-03-04 PROCEDURE — 80053 COMPREHEN METABOLIC PANEL: CPT

## 2025-03-04 PROCEDURE — 76819 FETAL BIOPHYS PROFIL W/O NST: CPT | Performed by: MEDICAL GENETICS

## 2025-03-04 PROCEDURE — 85025 COMPLETE CBC W/AUTO DIFF WBC: CPT

## 2025-03-05 ENCOUNTER — ANESTHESIA EVENT (OUTPATIENT)
Dept: OBSTETRICS AND GYNECOLOGY | Facility: HOSPITAL | Age: 35
End: 2025-03-05
Payer: COMMERCIAL

## 2025-03-05 ENCOUNTER — ANESTHESIA (OUTPATIENT)
Dept: OBSTETRICS AND GYNECOLOGY | Facility: HOSPITAL | Age: 35
End: 2025-03-05
Payer: COMMERCIAL

## 2025-03-05 ENCOUNTER — HOSPITAL ENCOUNTER (INPATIENT)
Facility: HOSPITAL | Age: 35
LOS: 3 days | Discharge: HOME | End: 2025-03-08
Attending: STUDENT IN AN ORGANIZED HEALTH CARE EDUCATION/TRAINING PROGRAM | Admitting: STUDENT IN AN ORGANIZED HEALTH CARE EDUCATION/TRAINING PROGRAM
Payer: COMMERCIAL

## 2025-03-05 DIAGNOSIS — O09.292: ICD-10-CM

## 2025-03-05 DIAGNOSIS — D64.9 ANEMIA, UNSPECIFIED TYPE: ICD-10-CM

## 2025-03-05 DIAGNOSIS — O14.13 SEVERE PRE-ECLAMPSIA IN THIRD TRIMESTER (HHS-HCC): Primary | ICD-10-CM

## 2025-03-05 PROBLEM — Z3A.37 37 WEEKS GESTATION OF PREGNANCY (HHS-HCC): Status: ACTIVE | Noted: 2025-03-05

## 2025-03-05 LAB
ALBUMIN SERPL BCP-MCNC: 2.7 G/DL (ref 3.4–5)
ALBUMIN SERPL BCP-MCNC: 2.8 G/DL (ref 3.4–5)
ALP SERPL-CCNC: 100 U/L (ref 33–110)
ALP SERPL-CCNC: 92 U/L (ref 33–110)
ALT SERPL W P-5'-P-CCNC: 26 U/L (ref 7–45)
ALT SERPL W P-5'-P-CCNC: 29 U/L (ref 7–45)
ANION GAP SERPL CALC-SCNC: 12 MMOL/L (ref 10–20)
ANION GAP SERPL CALC-SCNC: 14 MMOL/L (ref 10–20)
AST SERPL W P-5'-P-CCNC: 26 U/L (ref 9–39)
AST SERPL W P-5'-P-CCNC: 29 U/L (ref 9–39)
BILIRUB SERPL-MCNC: 0.2 MG/DL (ref 0–1.2)
BILIRUB SERPL-MCNC: 0.2 MG/DL (ref 0–1.2)
BUN SERPL-MCNC: 14 MG/DL (ref 6–23)
BUN SERPL-MCNC: 16 MG/DL (ref 6–23)
CALCIUM SERPL-MCNC: 7.5 MG/DL (ref 8.6–10.6)
CALCIUM SERPL-MCNC: 8.1 MG/DL (ref 8.6–10.6)
CHLORIDE SERPL-SCNC: 103 MMOL/L (ref 98–107)
CHLORIDE SERPL-SCNC: 105 MMOL/L (ref 98–107)
CO2 SERPL-SCNC: 19 MMOL/L (ref 21–32)
CO2 SERPL-SCNC: 20 MMOL/L (ref 21–32)
CREAT SERPL-MCNC: 0.62 MG/DL (ref 0.5–1.05)
CREAT SERPL-MCNC: 0.65 MG/DL (ref 0.5–1.05)
EGFRCR SERPLBLD CKD-EPI 2021: >90 ML/MIN/1.73M*2
EGFRCR SERPLBLD CKD-EPI 2021: >90 ML/MIN/1.73M*2
ERYTHROCYTE [DISTWIDTH] IN BLOOD BY AUTOMATED COUNT: 13.2 % (ref 11.5–14.5)
ERYTHROCYTE [DISTWIDTH] IN BLOOD BY AUTOMATED COUNT: 13.2 % (ref 11.5–14.5)
GLUCOSE SERPL-MCNC: 89 MG/DL (ref 74–99)
GLUCOSE SERPL-MCNC: 96 MG/DL (ref 74–99)
HCT VFR BLD AUTO: 27.6 % (ref 36–46)
HCT VFR BLD AUTO: 31.5 % (ref 36–46)
HGB BLD-MCNC: 8.5 G/DL (ref 12–16)
HGB BLD-MCNC: 9.7 G/DL (ref 12–16)
MAGNESIUM SERPL-MCNC: 5.33 MG/DL (ref 1.6–2.4)
MCH RBC QN AUTO: 26.6 PG (ref 26–34)
MCH RBC QN AUTO: 26.8 PG (ref 26–34)
MCHC RBC AUTO-ENTMCNC: 30.8 G/DL (ref 32–36)
MCHC RBC AUTO-ENTMCNC: 30.8 G/DL (ref 32–36)
MCV RBC AUTO: 86 FL (ref 80–100)
MCV RBC AUTO: 87 FL (ref 80–100)
NRBC BLD-RTO: 0 /100 WBCS (ref 0–0)
NRBC BLD-RTO: 0 /100 WBCS (ref 0–0)
PLATELET # BLD AUTO: 206 X10*3/UL (ref 150–450)
PLATELET # BLD AUTO: 211 X10*3/UL (ref 150–450)
POTASSIUM SERPL-SCNC: 4 MMOL/L (ref 3.5–5.3)
POTASSIUM SERPL-SCNC: 4.3 MMOL/L (ref 3.5–5.3)
PROT SERPL-MCNC: 4.9 G/DL (ref 6.4–8.2)
PROT SERPL-MCNC: 5.5 G/DL (ref 6.4–8.2)
RBC # BLD AUTO: 3.17 X10*6/UL (ref 4–5.2)
RBC # BLD AUTO: 3.65 X10*6/UL (ref 4–5.2)
SODIUM SERPL-SCNC: 131 MMOL/L (ref 136–145)
SODIUM SERPL-SCNC: 134 MMOL/L (ref 136–145)
TREPONEMA PALLIDUM IGG+IGM AB [PRESENCE] IN SERUM OR PLASMA BY IMMUNOASSAY: NONREACTIVE
WBC # BLD AUTO: 17.2 X10*3/UL (ref 4.4–11.3)
WBC # BLD AUTO: 23.3 X10*3/UL (ref 4.4–11.3)

## 2025-03-05 PROCEDURE — 85027 COMPLETE CBC AUTOMATED: CPT | Performed by: STUDENT IN AN ORGANIZED HEALTH CARE EDUCATION/TRAINING PROGRAM

## 2025-03-05 PROCEDURE — 2500000001 HC RX 250 WO HCPCS SELF ADMINISTERED DRUGS (ALT 637 FOR MEDICARE OP): Performed by: ANESTHESIOLOGIST ASSISTANT

## 2025-03-05 PROCEDURE — 2500000001 HC RX 250 WO HCPCS SELF ADMINISTERED DRUGS (ALT 637 FOR MEDICARE OP)

## 2025-03-05 PROCEDURE — 80053 COMPREHEN METABOLIC PANEL: CPT | Performed by: STUDENT IN AN ORGANIZED HEALTH CARE EDUCATION/TRAINING PROGRAM

## 2025-03-05 PROCEDURE — 2500000005 HC RX 250 GENERAL PHARMACY W/O HCPCS: Performed by: ANESTHESIOLOGIST ASSISTANT

## 2025-03-05 PROCEDURE — 2720000007 HC OR 272 NO HCPCS: Performed by: STUDENT IN AN ORGANIZED HEALTH CARE EDUCATION/TRAINING PROGRAM

## 2025-03-05 PROCEDURE — 1100000001 HC PRIVATE ROOM DAILY

## 2025-03-05 PROCEDURE — 99232 SBSQ HOSP IP/OBS MODERATE 35: CPT

## 2025-03-05 PROCEDURE — 2500000004 HC RX 250 GENERAL PHARMACY W/ HCPCS (ALT 636 FOR OP/ED)

## 2025-03-05 PROCEDURE — 2500000004 HC RX 250 GENERAL PHARMACY W/ HCPCS (ALT 636 FOR OP/ED): Performed by: ANESTHESIOLOGIST ASSISTANT

## 2025-03-05 PROCEDURE — 36415 COLL VENOUS BLD VENIPUNCTURE: CPT | Performed by: STUDENT IN AN ORGANIZED HEALTH CARE EDUCATION/TRAINING PROGRAM

## 2025-03-05 PROCEDURE — 59514 CESAREAN DELIVERY ONLY: CPT | Performed by: STUDENT IN AN ORGANIZED HEALTH CARE EDUCATION/TRAINING PROGRAM

## 2025-03-05 PROCEDURE — 88307 TISSUE EXAM BY PATHOLOGIST: CPT | Mod: TC,SUR | Performed by: STUDENT IN AN ORGANIZED HEALTH CARE EDUCATION/TRAINING PROGRAM

## 2025-03-05 PROCEDURE — 99199 UNLISTED SPECIAL SVC PX/RPRT: CPT

## 2025-03-05 PROCEDURE — 86780 TREPONEMA PALLIDUM: CPT | Performed by: STUDENT IN AN ORGANIZED HEALTH CARE EDUCATION/TRAINING PROGRAM

## 2025-03-05 PROCEDURE — 2500000004 HC RX 250 GENERAL PHARMACY W/ HCPCS (ALT 636 FOR OP/ED): Performed by: STUDENT IN AN ORGANIZED HEALTH CARE EDUCATION/TRAINING PROGRAM

## 2025-03-05 PROCEDURE — 3700000014 HC AN EPIDURAL BLOCK CHARGE: Performed by: STUDENT IN AN ORGANIZED HEALTH CARE EDUCATION/TRAINING PROGRAM

## 2025-03-05 PROCEDURE — 2500000002 HC RX 250 W HCPCS SELF ADMINISTERED DRUGS (ALT 637 FOR MEDICARE OP, ALT 636 FOR OP/ED)

## 2025-03-05 PROCEDURE — 83735 ASSAY OF MAGNESIUM: CPT

## 2025-03-05 PROCEDURE — 7100000016 HC LABOR RECOVERY PER HOUR: Performed by: STUDENT IN AN ORGANIZED HEALTH CARE EDUCATION/TRAINING PROGRAM

## 2025-03-05 RX ORDER — POLYETHYLENE GLYCOL 3350 17 G/17G
17 POWDER, FOR SOLUTION ORAL 2 TIMES DAILY PRN
Status: DISCONTINUED | OUTPATIENT
Start: 2025-03-05 | End: 2025-03-08 | Stop reason: HOSPADM

## 2025-03-05 RX ORDER — FUROSEMIDE 10 MG/ML
20 INJECTION INTRAMUSCULAR; INTRAVENOUS ONCE
Status: COMPLETED | OUTPATIENT
Start: 2025-03-05 | End: 2025-03-05

## 2025-03-05 RX ORDER — NIFEDIPINE 10 MG/1
10 CAPSULE ORAL ONCE AS NEEDED
Status: DISCONTINUED | OUTPATIENT
Start: 2025-03-05 | End: 2025-03-05

## 2025-03-05 RX ORDER — OXYTOCIN/0.9 % SODIUM CHLORIDE 30/500 ML
60 PLASTIC BAG, INJECTION (ML) INTRAVENOUS ONCE AS NEEDED
Status: DISCONTINUED | OUTPATIENT
Start: 2025-03-05 | End: 2025-03-05 | Stop reason: HOSPADM

## 2025-03-05 RX ORDER — METOCLOPRAMIDE 10 MG/1
10 TABLET ORAL EVERY 6 HOURS PRN
Status: DISCONTINUED | OUTPATIENT
Start: 2025-03-05 | End: 2025-03-08 | Stop reason: HOSPADM

## 2025-03-05 RX ORDER — LIDOCAINE HYDROCHLORIDE 10 MG/ML
0.5 INJECTION, SOLUTION INFILTRATION; PERINEURAL ONCE AS NEEDED
Status: DISCONTINUED | OUTPATIENT
Start: 2025-03-05 | End: 2025-03-05

## 2025-03-05 RX ORDER — DIPHENHYDRAMINE HYDROCHLORIDE 50 MG/ML
25 INJECTION INTRAMUSCULAR; INTRAVENOUS EVERY 4 HOURS PRN
Status: DISCONTINUED | OUTPATIENT
Start: 2025-03-05 | End: 2025-03-08 | Stop reason: HOSPADM

## 2025-03-05 RX ORDER — LOPERAMIDE HYDROCHLORIDE 2 MG/1
4 CAPSULE ORAL EVERY 2 HOUR PRN
Status: DISCONTINUED | OUTPATIENT
Start: 2025-03-05 | End: 2025-03-08 | Stop reason: HOSPADM

## 2025-03-05 RX ORDER — LIDOCAINE HYDROCHLORIDE 10 MG/ML
30 INJECTION, SOLUTION INFILTRATION; PERINEURAL ONCE AS NEEDED
Status: DISCONTINUED | OUTPATIENT
Start: 2025-03-05 | End: 2025-03-05 | Stop reason: HOSPADM

## 2025-03-05 RX ORDER — KETOROLAC TROMETHAMINE 30 MG/ML
INJECTION, SOLUTION INTRAMUSCULAR; INTRAVENOUS AS NEEDED
Status: DISCONTINUED | OUTPATIENT
Start: 2025-03-05 | End: 2025-03-05

## 2025-03-05 RX ORDER — OXYTOCIN 10 [USP'U]/ML
10 INJECTION, SOLUTION INTRAMUSCULAR; INTRAVENOUS ONCE AS NEEDED
Status: DISCONTINUED | OUTPATIENT
Start: 2025-03-05 | End: 2025-03-05 | Stop reason: HOSPADM

## 2025-03-05 RX ORDER — OXYCODONE HYDROCHLORIDE 5 MG/1
5 TABLET ORAL EVERY 4 HOURS PRN
Status: DISCONTINUED | OUTPATIENT
Start: 2025-03-06 | End: 2025-03-08 | Stop reason: HOSPADM

## 2025-03-05 RX ORDER — OXYTOCIN/0.9 % SODIUM CHLORIDE 30/500 ML
60 PLASTIC BAG, INJECTION (ML) INTRAVENOUS ONCE AS NEEDED
Status: DISCONTINUED | OUTPATIENT
Start: 2025-03-05 | End: 2025-03-08 | Stop reason: HOSPADM

## 2025-03-05 RX ORDER — FENTANYL CITRATE 50 UG/ML
INJECTION, SOLUTION INTRAMUSCULAR; INTRAVENOUS AS NEEDED
Status: DISCONTINUED | OUTPATIENT
Start: 2025-03-05 | End: 2025-03-05

## 2025-03-05 RX ORDER — CARBOPROST TROMETHAMINE 250 UG/ML
250 INJECTION, SOLUTION INTRAMUSCULAR ONCE AS NEEDED
Status: DISCONTINUED | OUTPATIENT
Start: 2025-03-05 | End: 2025-03-05 | Stop reason: HOSPADM

## 2025-03-05 RX ORDER — SIMETHICONE 80 MG
80 TABLET,CHEWABLE ORAL 4 TIMES DAILY PRN
Status: DISCONTINUED | OUTPATIENT
Start: 2025-03-05 | End: 2025-03-08 | Stop reason: HOSPADM

## 2025-03-05 RX ORDER — TRANEXAMIC ACID 100 MG/ML
1000 INJECTION, SOLUTION INTRAVENOUS ONCE AS NEEDED
Status: DISCONTINUED | OUTPATIENT
Start: 2025-03-05 | End: 2025-03-08 | Stop reason: HOSPADM

## 2025-03-05 RX ORDER — ACETAMINOPHEN 650 MG/1
SUPPOSITORY RECTAL AS NEEDED
Status: DISCONTINUED | OUTPATIENT
Start: 2025-03-05 | End: 2025-03-05

## 2025-03-05 RX ORDER — CYCLOBENZAPRINE HCL 10 MG
5 TABLET ORAL 3 TIMES DAILY PRN
Status: DISCONTINUED | OUTPATIENT
Start: 2025-03-05 | End: 2025-03-08 | Stop reason: HOSPADM

## 2025-03-05 RX ORDER — MISOPROSTOL 200 UG/1
800 TABLET ORAL ONCE AS NEEDED
Status: DISCONTINUED | OUTPATIENT
Start: 2025-03-05 | End: 2025-03-08 | Stop reason: HOSPADM

## 2025-03-05 RX ORDER — OXYTOCIN/0.9 % SODIUM CHLORIDE 30/500 ML
PLASTIC BAG, INJECTION (ML) INTRAVENOUS CONTINUOUS PRN
Status: DISCONTINUED | OUTPATIENT
Start: 2025-03-05 | End: 2025-03-05

## 2025-03-05 RX ORDER — SCOPOLAMINE 1 MG/3D
1 PATCH, EXTENDED RELEASE TRANSDERMAL
Status: DISCONTINUED | OUTPATIENT
Start: 2025-03-05 | End: 2025-03-08 | Stop reason: HOSPADM

## 2025-03-05 RX ORDER — NIFEDIPINE 10 MG/1
CAPSULE ORAL
Status: COMPLETED
Start: 2025-03-05 | End: 2025-03-05

## 2025-03-05 RX ORDER — METHYLERGONOVINE MALEATE 0.2 MG/ML
0.2 INJECTION INTRAVENOUS ONCE AS NEEDED
Status: DISCONTINUED | OUTPATIENT
Start: 2025-03-05 | End: 2025-03-08 | Stop reason: HOSPADM

## 2025-03-05 RX ORDER — OXYTOCIN 10 [USP'U]/ML
10 INJECTION, SOLUTION INTRAMUSCULAR; INTRAVENOUS ONCE AS NEEDED
Status: DISCONTINUED | OUTPATIENT
Start: 2025-03-05 | End: 2025-03-08 | Stop reason: HOSPADM

## 2025-03-05 RX ORDER — MORPHINE SULFATE 0.5 MG/ML
INJECTION, SOLUTION EPIDURAL; INTRATHECAL; INTRAVENOUS AS NEEDED
Status: DISCONTINUED | OUTPATIENT
Start: 2025-03-05 | End: 2025-03-05

## 2025-03-05 RX ORDER — NALOXONE HYDROCHLORIDE 0.4 MG/ML
0.1 INJECTION, SOLUTION INTRAMUSCULAR; INTRAVENOUS; SUBCUTANEOUS EVERY 5 MIN PRN
Status: DISCONTINUED | OUTPATIENT
Start: 2025-03-05 | End: 2025-03-08 | Stop reason: HOSPADM

## 2025-03-05 RX ORDER — ADHESIVE BANDAGE
10 BANDAGE TOPICAL
Status: DISCONTINUED | OUTPATIENT
Start: 2025-03-05 | End: 2025-03-08 | Stop reason: HOSPADM

## 2025-03-05 RX ORDER — ONDANSETRON HYDROCHLORIDE 2 MG/ML
INJECTION, SOLUTION INTRAVENOUS AS NEEDED
Status: DISCONTINUED | OUTPATIENT
Start: 2025-03-05 | End: 2025-03-05

## 2025-03-05 RX ORDER — OXYCODONE HYDROCHLORIDE 10 MG/1
10 TABLET ORAL EVERY 4 HOURS PRN
Status: DISCONTINUED | OUTPATIENT
Start: 2025-03-06 | End: 2025-03-08 | Stop reason: HOSPADM

## 2025-03-05 RX ORDER — DIPHENHYDRAMINE HCL 25 MG
25 CAPSULE ORAL EVERY 4 HOURS PRN
Status: DISCONTINUED | OUTPATIENT
Start: 2025-03-05 | End: 2025-03-08 | Stop reason: HOSPADM

## 2025-03-05 RX ORDER — ONDANSETRON HYDROCHLORIDE 2 MG/ML
4 INJECTION, SOLUTION INTRAVENOUS EVERY 6 HOURS PRN
Status: DISCONTINUED | OUTPATIENT
Start: 2025-03-05 | End: 2025-03-05

## 2025-03-05 RX ORDER — ONDANSETRON 4 MG/1
4 TABLET, FILM COATED ORAL EVERY 6 HOURS PRN
Status: DISCONTINUED | OUTPATIENT
Start: 2025-03-05 | End: 2025-03-05

## 2025-03-05 RX ORDER — CARBOPROST TROMETHAMINE 250 UG/ML
INJECTION, SOLUTION INTRAMUSCULAR AS NEEDED
Status: DISCONTINUED | OUTPATIENT
Start: 2025-03-05 | End: 2025-03-05

## 2025-03-05 RX ORDER — LABETALOL HYDROCHLORIDE 5 MG/ML
20 INJECTION, SOLUTION INTRAVENOUS ONCE AS NEEDED
Status: DISCONTINUED | OUTPATIENT
Start: 2025-03-05 | End: 2025-03-05

## 2025-03-05 RX ORDER — TRANEXAMIC ACID 100 MG/ML
1000 INJECTION, SOLUTION INTRAVENOUS ONCE AS NEEDED
Status: DISCONTINUED | OUTPATIENT
Start: 2025-03-05 | End: 2025-03-05 | Stop reason: HOSPADM

## 2025-03-05 RX ORDER — LABETALOL HYDROCHLORIDE 5 MG/ML
INJECTION, SOLUTION INTRAVENOUS
Status: COMPLETED
Start: 2025-03-05 | End: 2025-03-05

## 2025-03-05 RX ORDER — CARBOPROST TROMETHAMINE 250 UG/ML
250 INJECTION, SOLUTION INTRAMUSCULAR ONCE AS NEEDED
Status: DISCONTINUED | OUTPATIENT
Start: 2025-03-05 | End: 2025-03-08 | Stop reason: HOSPADM

## 2025-03-05 RX ORDER — MISOPROSTOL 200 UG/1
800 TABLET ORAL ONCE AS NEEDED
Status: DISCONTINUED | OUTPATIENT
Start: 2025-03-05 | End: 2025-03-05 | Stop reason: HOSPADM

## 2025-03-05 RX ORDER — DEXMEDETOMIDINE IN 0.9 % NACL 20 MCG/5ML
SYRINGE (ML) INTRAVENOUS AS NEEDED
Status: DISCONTINUED | OUTPATIENT
Start: 2025-03-05 | End: 2025-03-05

## 2025-03-05 RX ORDER — ONDANSETRON 4 MG/1
4 TABLET, FILM COATED ORAL EVERY 6 HOURS PRN
Status: DISCONTINUED | OUTPATIENT
Start: 2025-03-05 | End: 2025-03-08 | Stop reason: HOSPADM

## 2025-03-05 RX ORDER — TERBUTALINE SULFATE 1 MG/ML
0.25 INJECTION SUBCUTANEOUS ONCE AS NEEDED
Status: DISCONTINUED | OUTPATIENT
Start: 2025-03-05 | End: 2025-03-05 | Stop reason: HOSPADM

## 2025-03-05 RX ORDER — ENOXAPARIN SODIUM 100 MG/ML
40 INJECTION SUBCUTANEOUS EVERY 24 HOURS
Status: DISCONTINUED | OUTPATIENT
Start: 2025-03-06 | End: 2025-03-08 | Stop reason: HOSPADM

## 2025-03-05 RX ORDER — FAMOTIDINE 10 MG/ML
INJECTION, SOLUTION INTRAVENOUS AS NEEDED
Status: DISCONTINUED | OUTPATIENT
Start: 2025-03-05 | End: 2025-03-05

## 2025-03-05 RX ORDER — PHENYLEPHRINE 10 MG/250 ML(40 MCG/ML)IN 0.9 % SOD.CHLORIDE INTRAVENOUS
CONTINUOUS PRN
Status: DISCONTINUED | OUTPATIENT
Start: 2025-03-05 | End: 2025-03-05

## 2025-03-05 RX ORDER — LABETALOL HYDROCHLORIDE 5 MG/ML
20 INJECTION, SOLUTION INTRAVENOUS ONCE AS NEEDED
Status: COMPLETED | OUTPATIENT
Start: 2025-03-05 | End: 2025-03-05

## 2025-03-05 RX ORDER — KETOROLAC TROMETHAMINE 30 MG/ML
30 INJECTION, SOLUTION INTRAMUSCULAR; INTRAVENOUS EVERY 6 HOURS
Status: COMPLETED | OUTPATIENT
Start: 2025-03-05 | End: 2025-03-06

## 2025-03-05 RX ORDER — HYDRALAZINE HYDROCHLORIDE 20 MG/ML
5 INJECTION INTRAMUSCULAR; INTRAVENOUS ONCE AS NEEDED
Status: DISCONTINUED | OUTPATIENT
Start: 2025-03-05 | End: 2025-03-08 | Stop reason: HOSPADM

## 2025-03-05 RX ORDER — BUPIVACAINE HYDROCHLORIDE 7.5 MG/ML
INJECTION, SOLUTION EPIDURAL; RETROBULBAR AS NEEDED
Status: DISCONTINUED | OUTPATIENT
Start: 2025-03-05 | End: 2025-03-05

## 2025-03-05 RX ORDER — ONDANSETRON HYDROCHLORIDE 2 MG/ML
4 INJECTION, SOLUTION INTRAVENOUS EVERY 6 HOURS PRN
Status: DISCONTINUED | OUTPATIENT
Start: 2025-03-05 | End: 2025-03-08 | Stop reason: HOSPADM

## 2025-03-05 RX ORDER — CEFAZOLIN 1 G/1
INJECTION, POWDER, FOR SOLUTION INTRAVENOUS AS NEEDED
Status: DISCONTINUED | OUTPATIENT
Start: 2025-03-05 | End: 2025-03-05

## 2025-03-05 RX ORDER — IBUPROFEN 600 MG/1
600 TABLET ORAL EVERY 6 HOURS
Status: DISCONTINUED | OUTPATIENT
Start: 2025-03-06 | End: 2025-03-08 | Stop reason: HOSPADM

## 2025-03-05 RX ORDER — HYDRALAZINE HYDROCHLORIDE 20 MG/ML
5 INJECTION INTRAMUSCULAR; INTRAVENOUS ONCE AS NEEDED
Status: DISCONTINUED | OUTPATIENT
Start: 2025-03-05 | End: 2025-03-05

## 2025-03-05 RX ORDER — CALCIUM GLUCONATE 98 MG/ML
1 INJECTION, SOLUTION INTRAVENOUS ONCE AS NEEDED
Status: DISCONTINUED | OUTPATIENT
Start: 2025-03-05 | End: 2025-03-08 | Stop reason: HOSPADM

## 2025-03-05 RX ORDER — MAGNESIUM SULFATE HEPTAHYDRATE 40 MG/ML
2 INJECTION, SOLUTION INTRAVENOUS CONTINUOUS
Status: DISCONTINUED | OUTPATIENT
Start: 2025-03-05 | End: 2025-03-08 | Stop reason: HOSPADM

## 2025-03-05 RX ORDER — METHYLERGONOVINE MALEATE 0.2 MG/ML
0.2 INJECTION INTRAVENOUS ONCE AS NEEDED
Status: DISCONTINUED | OUTPATIENT
Start: 2025-03-05 | End: 2025-03-05 | Stop reason: HOSPADM

## 2025-03-05 RX ORDER — LOPERAMIDE HYDROCHLORIDE 2 MG/1
4 CAPSULE ORAL EVERY 2 HOUR PRN
Status: DISCONTINUED | OUTPATIENT
Start: 2025-03-05 | End: 2025-03-05 | Stop reason: HOSPADM

## 2025-03-05 RX ORDER — SODIUM CHLORIDE, SODIUM LACTATE, POTASSIUM CHLORIDE, CALCIUM CHLORIDE 600; 310; 30; 20 MG/100ML; MG/100ML; MG/100ML; MG/100ML
50 INJECTION, SOLUTION INTRAVENOUS CONTINUOUS
Status: ACTIVE | OUTPATIENT
Start: 2025-03-05 | End: 2025-03-06

## 2025-03-05 RX ORDER — LIDOCAINE 560 MG/1
1 PATCH PERCUTANEOUS; TOPICAL; TRANSDERMAL
Status: DISCONTINUED | OUTPATIENT
Start: 2025-03-05 | End: 2025-03-08 | Stop reason: HOSPADM

## 2025-03-05 RX ORDER — HYDROMORPHONE HYDROCHLORIDE 0.2 MG/ML
0.2 INJECTION INTRAMUSCULAR; INTRAVENOUS; SUBCUTANEOUS EVERY 5 MIN PRN
Status: DISCONTINUED | OUTPATIENT
Start: 2025-03-05 | End: 2025-03-08 | Stop reason: HOSPADM

## 2025-03-05 RX ORDER — LABETALOL HYDROCHLORIDE 5 MG/ML
20 INJECTION, SOLUTION INTRAVENOUS ONCE AS NEEDED
Status: DISCONTINUED | OUTPATIENT
Start: 2025-03-05 | End: 2025-03-08 | Stop reason: HOSPADM

## 2025-03-05 RX ORDER — HYDRALAZINE HYDROCHLORIDE 20 MG/ML
5 INJECTION INTRAMUSCULAR; INTRAVENOUS ONCE AS NEEDED
Status: DISCONTINUED | OUTPATIENT
Start: 2025-03-05 | End: 2025-03-05 | Stop reason: HOSPADM

## 2025-03-05 RX ORDER — METOCLOPRAMIDE HYDROCHLORIDE 5 MG/ML
10 INJECTION INTRAMUSCULAR; INTRAVENOUS EVERY 6 HOURS PRN
Status: DISCONTINUED | OUTPATIENT
Start: 2025-03-05 | End: 2025-03-08 | Stop reason: HOSPADM

## 2025-03-05 RX ORDER — ACETAMINOPHEN 325 MG/1
975 TABLET ORAL EVERY 6 HOURS
Status: DISCONTINUED | OUTPATIENT
Start: 2025-03-05 | End: 2025-03-08 | Stop reason: HOSPADM

## 2025-03-05 RX ADMIN — BUPIVACAINE HYDROCHLORIDE 1.8 ML: 7.5 INJECTION, SOLUTION EPIDURAL; RETROBULBAR at 12:35

## 2025-03-05 RX ADMIN — ACETAMINOPHEN 650 MG: 650 SUPPOSITORY RECTAL at 13:52

## 2025-03-05 RX ADMIN — ONDANSETRON 4 MG: 2 INJECTION INTRAMUSCULAR; INTRAVENOUS at 17:58

## 2025-03-05 RX ADMIN — FENTANYL CITRATE 50 MCG: 50 INJECTION, SOLUTION INTRAMUSCULAR; INTRAVENOUS at 13:30

## 2025-03-05 RX ADMIN — Medication 8 MCG: at 13:20

## 2025-03-05 RX ADMIN — Medication 12 MCG: at 13:23

## 2025-03-05 RX ADMIN — KETOROLAC TROMETHAMINE 30 MG: 30 INJECTION, SOLUTION INTRAMUSCULAR; INTRAVENOUS at 20:15

## 2025-03-05 RX ADMIN — MAGNESIUM SULFATE HEPTAHYDRATE 2 G/HR: 40 INJECTION, SOLUTION INTRAVENOUS at 18:30

## 2025-03-05 RX ADMIN — FUROSEMIDE 20 MG: 10 INJECTION, SOLUTION INTRAMUSCULAR; INTRAVENOUS at 20:15

## 2025-03-05 RX ADMIN — NIFEDIPINE 10 MG: 10 CAPSULE ORAL at 11:22

## 2025-03-05 RX ADMIN — PHENYLEPHRINE-NACL IV SOLUTION 10 MG/250ML-0.9% 0.5 MCG/KG/MIN: 10-0.9/25 SOLUTION at 12:35

## 2025-03-05 RX ADMIN — Medication 12 MCG: at 13:17

## 2025-03-05 RX ADMIN — LABETALOL HYDROCHLORIDE 20 MG: 5 INJECTION, SOLUTION INTRAVENOUS at 11:48

## 2025-03-05 RX ADMIN — Medication 8 MCG: at 13:25

## 2025-03-05 RX ADMIN — SODIUM CHLORIDE, POTASSIUM CHLORIDE, SODIUM LACTATE AND CALCIUM CHLORIDE 75 ML/HR: 600; 310; 30; 20 INJECTION, SOLUTION INTRAVENOUS at 11:53

## 2025-03-05 RX ADMIN — SODIUM CHLORIDE, SODIUM LACTATE, POTASSIUM CHLORIDE, AND CALCIUM CHLORIDE: 600; 310; 30; 20 INJECTION, SOLUTION INTRAVENOUS at 12:26

## 2025-03-05 RX ADMIN — CEFAZOLIN 2 G: 1 INJECTION, POWDER, FOR SOLUTION INTRAMUSCULAR; INTRAVENOUS at 12:53

## 2025-03-05 RX ADMIN — CYCLOBENZAPRINE 5 MG: 10 TABLET, FILM COATED ORAL at 19:05

## 2025-03-05 RX ADMIN — CARBOPROST TROMETHAMINE 250 MCG: 250 INJECTION, SOLUTION INTRAMUSCULAR at 13:15

## 2025-03-05 RX ADMIN — MAGNESIUM SULFATE HEPTAHYDRATE 2 G/HR: 40 INJECTION, SOLUTION INTRAVENOUS at 12:21

## 2025-03-05 RX ADMIN — MORPHINE SULFATE 0.15 MG: 0.5 INJECTION EPIDURAL; INTRATHECAL; INTRAVENOUS at 12:35

## 2025-03-05 RX ADMIN — FENTANYL CITRATE 50 MCG: 50 INJECTION, SOLUTION INTRAMUSCULAR; INTRAVENOUS at 13:34

## 2025-03-05 RX ADMIN — ACETAMINOPHEN 975 MG: 325 TABLET ORAL at 20:15

## 2025-03-05 RX ADMIN — KETOROLAC TROMETHAMINE 30 MG: 30 INJECTION, SOLUTION INTRAMUSCULAR; INTRAVENOUS at 13:31

## 2025-03-05 RX ADMIN — LABETALOL HYDROCHLORIDE 20 MG: 5 INJECTION INTRAVENOUS at 11:48

## 2025-03-05 RX ADMIN — FAMOTIDINE 20 MG: 10 INJECTION INTRAVENOUS at 12:26

## 2025-03-05 RX ADMIN — ONDANSETRON 4 MG: 2 INJECTION INTRAMUSCULAR; INTRAVENOUS at 13:20

## 2025-03-05 RX ADMIN — ONDANSETRON 4 MG: 2 INJECTION INTRAMUSCULAR; INTRAVENOUS at 12:26

## 2025-03-05 RX ADMIN — Medication 600 MILLI-UNITS/MIN: at 13:09

## 2025-03-05 RX ADMIN — METOCLOPRAMIDE HYDROCHLORIDE 10 MG: 5 INJECTION INTRAMUSCULAR; INTRAVENOUS at 18:36

## 2025-03-05 SDOH — SOCIAL STABILITY: SOCIAL INSECURITY: HAVE YOU HAD THOUGHTS OF HARMING ANYONE ELSE?: NO

## 2025-03-05 SDOH — HEALTH STABILITY: MENTAL HEALTH: WISH TO BE DEAD (PAST 1 MONTH): NO

## 2025-03-05 SDOH — HEALTH STABILITY: MENTAL HEALTH: HAVE YOU USED ANY SUBSTANCES (CANABIS, COCAINE, HEROIN, HALLUCINOGENS, INHALANTS, ETC.) IN THE PAST 12 MONTHS?: NO

## 2025-03-05 SDOH — HEALTH STABILITY: MENTAL HEALTH: NON-SPECIFIC ACTIVE SUICIDAL THOUGHTS (PAST 1 MONTH): NO

## 2025-03-05 SDOH — SOCIAL STABILITY: SOCIAL INSECURITY: HAS ANYONE EVER THREATENED TO HURT YOUR FAMILY OR YOUR PETS?: NO

## 2025-03-05 SDOH — SOCIAL STABILITY: SOCIAL INSECURITY: HAVE YOU HAD ANY THOUGHTS OF HARMING ANYONE ELSE?: NO

## 2025-03-05 SDOH — ECONOMIC STABILITY: HOUSING INSECURITY: DO YOU FEEL UNSAFE GOING BACK TO THE PLACE WHERE YOU ARE LIVING?: NO

## 2025-03-05 SDOH — HEALTH STABILITY: MENTAL HEALTH: WERE YOU ABLE TO COMPLETE ALL THE BEHAVIORAL HEALTH SCREENINGS?: YES

## 2025-03-05 SDOH — SOCIAL STABILITY: SOCIAL INSECURITY: PHYSICAL ABUSE: DENIES

## 2025-03-05 SDOH — SOCIAL STABILITY: SOCIAL INSECURITY: DO YOU FEEL ANYONE HAS EXPLOITED OR TAKEN ADVANTAGE OF YOU FINANCIALLY OR OF YOUR PERSONAL PROPERTY?: NO

## 2025-03-05 SDOH — SOCIAL STABILITY: SOCIAL INSECURITY: ARE THERE ANY APPARENT SIGNS OF INJURIES/BEHAVIORS THAT COULD BE RELATED TO ABUSE/NEGLECT?: NO

## 2025-03-05 SDOH — SOCIAL STABILITY: SOCIAL INSECURITY: ABUSE SCREEN: ADULT

## 2025-03-05 SDOH — HEALTH STABILITY: MENTAL HEALTH: HAVE YOU USED ANY PRESCRIPTION DRUGS OTHER THAN PRESCRIBED IN THE PAST 12 MONTHS?: NO

## 2025-03-05 SDOH — SOCIAL STABILITY: SOCIAL INSECURITY: VERBAL ABUSE: DENIES

## 2025-03-05 SDOH — SOCIAL STABILITY: SOCIAL INSECURITY: ARE YOU OR HAVE YOU BEEN THREATENED OR ABUSED PHYSICALLY, EMOTIONALLY, OR SEXUALLY BY ANYONE?: NO

## 2025-03-05 SDOH — SOCIAL STABILITY: SOCIAL INSECURITY: DOES ANYONE TRY TO KEEP YOU FROM HAVING/CONTACTING OTHER FRIENDS OR DOING THINGS OUTSIDE YOUR HOME?: NO

## 2025-03-05 SDOH — HEALTH STABILITY: MENTAL HEALTH: SUICIDAL BEHAVIOR (LIFETIME): NO

## 2025-03-05 ASSESSMENT — PAIN SCALES - GENERAL
PAINLEVEL_OUTOF10: 0 - NO PAIN
PAINLEVEL_OUTOF10: 3

## 2025-03-05 ASSESSMENT — LIFESTYLE VARIABLES
HOW MANY STANDARD DRINKS CONTAINING ALCOHOL DO YOU HAVE ON A TYPICAL DAY: PATIENT DOES NOT DRINK
SKIP TO QUESTIONS 9-10: 1
AUDIT-C TOTAL SCORE: 0
HOW OFTEN DO YOU HAVE A DRINK CONTAINING ALCOHOL: NEVER
AUDIT-C TOTAL SCORE: 0
HOW OFTEN DO YOU HAVE 6 OR MORE DRINKS ON ONE OCCASION: NEVER

## 2025-03-05 ASSESSMENT — PAIN DESCRIPTION - DESCRIPTORS: DESCRIPTORS: HEADACHE

## 2025-03-05 ASSESSMENT — ACTIVITIES OF DAILY LIVING (ADL): LACK_OF_TRANSPORTATION: PATIENT DECLINED

## 2025-03-05 ASSESSMENT — PATIENT HEALTH QUESTIONNAIRE - PHQ9
SUM OF ALL RESPONSES TO PHQ9 QUESTIONS 1 & 2: 0
1. LITTLE INTEREST OR PLEASURE IN DOING THINGS: NOT AT ALL
2. FEELING DOWN, DEPRESSED OR HOPELESS: NOT AT ALL

## 2025-03-05 NOTE — L&D DELIVERY NOTE
Birth Operative Report    Patient Name: Emelyn Perez  : 1990  MRN: [id number]  Age: 34 y.o.    /Para:   Gestational Age: 37w0d    Date of Surgery: 3/5/2025    Operating Room Location: [place] [address]    Pre-op Diagnosis:  Intrauterine Pregnancy at 37w0d  Hx classical CS  Severe preeclampsia    Post-op Diagnosis:  Same    Procedure:   Repeat Low Transverse  Section    Surgery Category at [facility] Time: Confirmed Scheduled, Non-urgent    Surgeon:    * Gerry Dash - Primary    Resident/Fellow/Other Assistant:  Surgeons and Role:     * Stephy Hernandez MD - Resident - Assisting    Anesthesia:  Type: spinal    Anesthesiologist: Bautista Rodriguez MD  C-AA: TACOS Delaney  STEPH: Lázaro Spangler    Surgical Staff:  Circulator: Deana Howell RN  Scrub Person: Lauren Neumann     Preoperative Antibiotics: Ancef 2 g    Indication for Procedure:   34 y.o.  at 37w0d scheduled for delivery in the setting of hx classical CS and gHTN. On arrival to L&D, diagnosed with preeclampsia with severe features. Pt and fetus stable, proceeded with scheduled CS.    Informed Consent:  The risks, benefits, complications, and alternatives were discussed with the patient. The patient understood that the risks of  section include but are not limited to infection, bleeding, injury to nearby structures or organs, possible need for transfusion, and potential need for more surgery. The patient stated understanding and desired to proceed. All questions were answered. The site of surgery was properly noted and marked. The patient's identity was confirmed, and the procedure verified as a  delivery. A Time Out was held and the above information confirmed.     Findings:   Moderate scarring of subcutaneous tissue. Minimal adhesive disease. Normal appearing gravid uterus, fallopian tubes, and ovaries. Amniotic fluid Clear, Male infant in Vertex     presentation, APGARS 9 , 9 .  Birth Weight 2.66  kg.    Description of Procedure:   Patient was taken to the OR where regional anesthesia was administered and found to be adequate.  She was then placed in the dorsal supine position with a left lateral tilt. A handley catheter was placed, SCDs were applied, and a vaginal prep was performed. A pre-procedure time out was performed. The patient was given a prophylactic dose of IV antibiotics.  She was then prepped and draped in the usual sterile fashion.     A Pfannenstiel skin incision was made with the scalpel through the skin and Bovie was used to dissect the subcutaneous fat to the underlying fascial layer. The fascia was incised in the midline with the scalpel and the incision was extended bilaterally sharply. The superior aspect of the incision was grasped, tented up with Kocher clamps and the rectus muscle was dissected off. The muscles were divided in the midline, the peritoneum was then identified and entered bluntly and incision extended superiorly and inferiorly taking care to avoid underlying viscera. A bladder flap was created by tenting up the serosa and incising with Metzenbaum scissors. The bladder blade was inserted.    Low transverse uterine incision was made with the scalpel, the uterine cavity was entered, and the hysterotomy was extended by cephalocaudal stretch. The infant was delivered atraumatically, the cord was clamped and cut, and infant was handed off to awaiting nursing.  A blood sample was collected. The placenta was then expressed. The uterus was exteriorized and cleared of all clot and debris. The uterine incision was repaired using a running locked stitch of 0-Vicryl in one layer. A figure of 8 stitch of 0-Vicryl was placed over right apex of the incision. Small serosal bleeders were cauterized. Hemabate was given during the hysterotomy closure. Adequate tone and hemostasis were noted.    The uterus was placed back inside the pelvis. The hysterotomy was again evaluated and found to be  hemostatic. The underside of the fascia and bladder and the rectus muscles were inspected, small bleeders were cauterized, and these layers were also found to be hemostatic. The fascia was closed using a running stitch of looped 0-PDS. 10cc of 2% lidocaine was injected at the right apex of the incision for pain control. The subcutaneous layer was irrigated, small bleeders were cauterized. The subcutaneous layer was re-approximated using a running stitch of 2-0 Monocryl. The skin was closed with 4-0 Monocryl.         * Gerry Diaz was present for key portions of the procedure.    EBL: 655cc    Complications:           Anesthesia Record               Intraprocedure I/O Totals          Intake    LR 1000.00 mL    Oxytocin Drip 0.00 mL    The total shown is the total volume documented since Anesthesia Start was filed.    Phenylephrine Drip 0.00 mL    The total shown is the total volume documented since Anesthesia Start was filed.    Total Intake 1000 mL       Output    Urine 250 mL    Total Blood Loss - Surgical Delivery (mL) 655 mL    Total Output 905 mL       Net    Net Volume 95 mL       Other (could not be determined as input or output)    Surgical Delivery Estimated Blood Loss (mL) 655            Blood products: none   Blood Product Administration History       None            Uterotonics/Hemostatic Agent: IV Pitocin 30 units and IM Carboprost 0.25 mg    Specimen:   Placenta  Delivered: 3/5/2025  1:11 PM  Appearance: Intact  Removal: Expressed    Disposition: pathology    Sponge/Instrument/Needle Counts: The sponge, lap and needle counts were correct.    Patient Disposition: Patient recovering on labor and delivery in stable condition.    Additional Procedures:  None    Task Performed by: RN or PA Surgical Assistant: N/A      Mathieu Perez [[id number]]      Labor Events    Rupture date/time: 3/5/2025 1309  Rupture type: Artificial  Fluid color: Clear  Fluid odor: None  Labor type:  Without  Labor  Labor allowed to proceed with plans for an attempted vaginal birth?: No  Complications: None       Labor Length    3rd stage: 0h 02m       Placenta    Placenta delivery date/time: 3/5/2025 1311  Placenta removal: Expressed  Placenta appearance: Intact  Placenta disposition: pathology       Cord    Vessels: 3 vessels  Complications: Nuchal  Nuchal intervention: reduced  Nuchal cord description: loose nuchal cord  Number of loops: 1  Delayed cord clamping?: Yes  Cord clamped date/time: 3/5/2025 13:10:00  Cord blood disposition: Lab  Gases sent?: No  Stem cell collection (by provider): No       Anesthesia    Method: Spinal       [facility] Delivery    Birth date/time: 3/5/2025 13:09:00  Delivery type: , Low Transverse   categorization: repeat   priority: scheduled  Indications for : Repeat   Complications: None       Resuscitation    Method: Suctioning, Tactile stimulation       Apgars    Living status: Living  Apgar Component Scores:  1 min.:  5 min.:  10 min.:  15 min.:  20 min.:    Skin color:  1  1       Heart rate:  2  2       Reflex irritability:  2  2       Muscle tone:  2  2       Respiratory effort:  2  2       Total:  9  9       Apgars assigned by: JOSUE HDZ       Delivery Providers    Delivering clinician: Gerry Dash MD   Provider Role    Deana Howell RN Delivery Nurse    Josue Hdz, RN Nursery Nurse    Stephy Hernandez MD Resident               Stephy Hernandez MD  PGY-4, Obstetrics and Gynecology

## 2025-03-05 NOTE — PROGRESS NOTES
Postpartum Progress Note    Assessment/Plan   Emelyn Perez is a 34 y.o., , who delivered at 37w0d gestation and is now postpartum day 0. S/p repeat CS in s/o sPEC     # sPEC  - Dx by SRBPs requiring IV tx  - Last IV Tx: IR Nifed 10, labetalol 20  - Currently not requiring PO BP med  - normotensive BP   - HELLP labs initially notable for elevated AST, resolved this morning. Repeat set of HELLP labs pending   - Mg 2g/hr until 24hr postpartum, currently no signs/sxs of Mg toxicity   - Currently has a 3/10 HA, will treat with Reglan, and Flexeril and scheduled pain meds   -Lower extremity edema, with 4+ pitting edema, no calf tenderness, low suspicion for DVT at this time. Will administer IV lasix 20mg now and  consider PO lasix x 5day if persistent     # POD#0  - pain well controlled  - fundus firm and nontender  - Acute blood loss anemia, starting Hgb 10.6 >  ml, Hemabate given > 9.7 POD#0, minimal lochia, continue to monitor for sign/sxs of anemia   - continue routine postpartum care  - regular diet, advance as tolerated   - handley in place while receiving Magnesium, appropriate UOP      # Feeding - breast  - Continue to encourage breast feeding  - Lactation consult prn     # Postpartum Birth control  - partner plans to get vasectomy      # DVT prophylaxis  - DVT score: 9  - for ppx lovenox  - SCDs  - Ambulation    Pregnancy notable for:  -gHTN  -Hx of classical CS at 26 wk in s/o sPEC     Dispo:  Anticipate DC PPD3 pending BP control.    D/w Dr. Huitron,  Alka Cartwright MD, PGY3   Assessment & Plan  37 weeks gestation of pregnancy ([facility])    H/O pre-eclampsia in prior pregnancy, currently pregnant, second trimester ([facility])    History of classical  section    Anemia    Pregnancy Problems (from 24 to present)       Problem Noted Diagnosed Resolved    37 weeks gestation of pregnancy ([facility]) 3/5/2025 by Chhaya Robin MD  No    Priority:  Medium       Antepartum mild  preeclampsia ([facility]) 2025 by Soumya Harmon MD  No    Priority:  Medium       Overview Addendum 2025 11:47 AM by Gabi Davenport MD     - dx gHTN in triage for MRBP > 4hrs apart  - HELLP labs neg, P:C 0.23  - weekly HELLP labs ordered  - already has NST scheduled for next week     At 35w2d, she had a P:C 0.33, now mild pree         H/O pre-eclampsia in prior pregnancy, currently pregnant, second trimester ([facility]) 2024 by Radha Abad MD  No    Priority:  Medium       Overview Addendum 2024  9:51 PM by Gabi Davenport MD     -dx at 23.5 wks, managed inpatient until 26.5 wks  -on ASA  -normal baseline labs  -prefers MFM management    Serial growth US:   23w6d US: EFW 751g, 86%, AC 73%         History of classical  section 2024 by Radha Abad MD  No    Priority:  Medium       Overview Addendum 2024  1:03 PM by Radha Abad MD     -classical  section for sPEC, FGR, breech at 26.5  -delivery 36-37 wks by repeat section         35 weeks gestation of pregnancy ([facility]) 2024 by Radha Abad MD  No    Priority:  Medium       Overview Addendum 2025 11:47 AM by Gabi Davenport MD     Desired provider in labor: [] CNM  [x] Physician  [x] Blood Products: [x] Yes, accepts [] No, needs counseling  [x] Initial BMI: 25.68   [x] Prenatal Labs: within normal limits  [x] Cervical Cancer Screening up to date: collected 2024   [x] Rh status: Rh pos  [x] Genetic Screening: see other problem  [x] NT US (11-13 wks): WNL  [x] Baby ASA (if indicated): taking  [x] Pregnancy dated by: US, no LMP     [x] Anatomy US: (19-20 wks): normal   [] Federal Sterilization consent signed (if indicated):  [x] 1hr GCT at 24-28wks: nml  [] Fetal Surveillance (if indicated):  [x] Tdap (27-32 wks, may be given up to 36 wks if initial window missed):   [] RSV (32-36 wks) (Sept. to end ):   [x] Flu Vaccine: 24    [] Breastfeeding:  [x] Postpartum Birth control method:  vasectomy  [x] GBS at 34 - 36 wks: neg  [x] Mode of delivery ( anticipated ): repeat  section at 36-37 wks given h/o classical  section               Subjective   Pt reporting 3/10 HA and nausea. Had an episode of emesis on arrival to floor. Denies change in vision, SOB, CP and RUQ pain. Pain is controlled on pain regimen. Mild lochia. Hasn't passed flatus yet. Has handley in place while on Mag gtt. Attempting to pump.     Objective   Allergies:   Patient has no known allergies.         Last Vitals:  Temp Pulse Resp BP MAP Pulse Ox   36.3 °C (97.3 °F) 73 17 138/87 104 95 %     Vitals Min/Max Last 24 Hours:  Temp  Min: 36.3 °C (97.3 °F)  Max: 36.7 °C (98.1 °F)  Pulse  Min: 70  Max: 100  Resp  Min: 16  Max: 17  BP  Min: 126/83  Max: 177/105  MAP (mmHg)  Min: 92  Max: 135    Intake/Output:     Intake/Output Summary (Last 24 hours) at 3/5/2025 1859  Last data filed at 3/5/2025 1713  Gross per 24 hour   Intake 2611 ml   Output 1325 ml   Net 1286 ml       Physical Exam:  General: no acute distress   HEENT: normocephalic, atraumatic   Cards: RRR   Pulm: lungs CTAB   Abdomen: soft, appropriate tenderness, fundus below umbilcius, no fundal tenderness , incision covered in dressing, no shadowing noted   : normal external anatomy, Handley catheter in place with yellow urine output   Extremities: no lesions, skin discoloration or calf tenderness , 4+ pitting edema bilatearlly   Neuro: no focal deficits , 2+ brachial reflexes bilaterally     Lab Data:  .  Results for orders placed or performed during the hospital encounter of 25 (from the past 24 hours)   Prepare RBC: 1 Units   Result Value Ref Range    PRODUCT CODE [id number]     Unit Number [id number]     Unit ABO O     Unit RH POS     XM INTEP COMP     Dispense Status XM     Blood Expiration Date 2025 11:59:00 PM EDT     PRODUCT BLOOD TYPE 5100     UNIT VOLUME 350    CBC   Result Value Ref Range    WBC 17.2 (H) 4.4 - 11.3 x10*3/uL    nRBC 0.0 0.0 -  0.0 /100 WBCs    RBC 3.65 (L) 4.00 - 5.20 x10*6/uL    Hemoglobin 9.7 (L) 12.0 - 16.0 g/dL    Hematocrit 31.5 (L) 36.0 - 46.0 %    MCV 86 80 - 100 fL    MCH 26.6 26.0 - 34.0 pg    MCHC 30.8 (L) 32.0 - 36.0 g/dL    RDW 13.2 11.5 - 14.5 %    Platelets 211 150 - 450 x10*3/uL   Comprehensive metabolic panel   Result Value Ref Range    Glucose 89 74 - 99 mg/dL    Sodium 134 (L) 136 - 145 mmol/L    Potassium 4.0 3.5 - 5.3 mmol/L    Chloride 105 98 - 107 mmol/L    Bicarbonate 19 (L) 21 - 32 mmol/L    Anion Gap 14 10 - 20 mmol/L    Urea Nitrogen 14 6 - 23 mg/dL    Creatinine 0.65 0.50 - 1.05 mg/dL    eGFR >90 >60 mL/min/1.73m*2    Calcium 8.1 (L) 8.6 - 10.6 mg/dL    Albumin 2.8 (L) 3.4 - 5.0 g/dL    Alkaline Phosphatase 100 33 - 110 U/L    Total Protein 5.5 (L) 6.4 - 8.2 g/dL    AST 29 9 - 39 U/L    Bilirubin, Total 0.2 0.0 - 1.2 mg/dL    ALT 29 7 - 45 U/L     *Note: Due to a large number of results and/or encounters for the requested time period, some results have not been displayed. A complete set of results can be found in Results Review.

## 2025-03-05 NOTE — H&P
OB Admission H&P    Assessment/Plan    Emelyn Perez is a 34 y.o.  at 37w0d, AGUILAR: 3/26/2025, by Ultrasound, who presents for a  delivery.    Scheduled  Section  -NPO, last ate at last night   -T&S, CBC, and Syphilis   -T&C: indicated  -Consent for surgery obtained, discussed risks of procedure including but not limited to: pain, bleeding, infection, damage to internal organs including bowel and bladder, possible blood transfusion.     Fetal Status  -NST reactive, reassuring   -Presentation cephalic based on ultrasound  -EFW 2900 g by US  -GBS negative    Postpartum  Contraception Plan:  partner plans vasectomy    Pregnancy Problems (from 24 to present)       Problem Noted Diagnosed Resolved    37 weeks gestation of pregnancy ([facility]) 3/5/2025 by Chhaya Robin MD  No    Priority:  Medium       Antepartum mild preeclampsia ([facility]) 2025 by Soumya Harmon MD  No    Priority:  Medium       Overview Addendum 2025 11:47 AM by Gabi Davenport MD     - dx gHTN in triage for MRBP > 4hrs apart  - HELLP labs neg, P:C 0.23  - weekly HELLP labs ordered  - already has NST scheduled for next week     At 35w2d, she had a P:C 0.33, now mild pree         H/O pre-eclampsia in prior pregnancy, currently pregnant, second trimester ([facility]) 2024 by Radha Abad MD  No    Priority:  Medium       Overview Addendum 2024  9:51 PM by Gabi Davenport MD     -dx at 23.5 wks, managed inpatient until 26.5 wks  -on ASA  -normal baseline labs  -prefers MFM management    Serial growth US:   23w6d US: EFW 751g, 86%, AC 73%         History of classical  section 2024 by Radha Abad MD  No    Priority:  Medium       Overview Addendum 2024  1:03 PM by Radha Abad MD     -classical  section for sPEC, FGR, breech at 26.5  -delivery 36-37 wks by repeat section         35 weeks gestation of pregnancy ([facility]) 2024 by Radha Abad MD  No    Priority:  Medium        Overview Addendum 2025 11:47 AM by Gabi Davenport MD     Desired provider in labor: [] CNM  [x] Physician  [x] Blood Products: [x] Yes, accepts [] No, needs counseling  [x] Initial BMI: 25.68   [x] Prenatal Labs: within normal limits  [x] Cervical Cancer Screening up to date: collected 2024   [x] Rh status: Rh pos  [x] Genetic Screening: see other problem  [x] NT US (11-13 wks): WNL  [x] Baby ASA (if indicated): taking  [x] Pregnancy dated by: US, no LMP     [x] Anatomy US: (19-20 wks): normal   [] Federal Sterilization consent signed (if indicated):  [x] 1hr GCT at 24-28wks: nml  [] Fetal Surveillance (if indicated):  [x] Tdap (27-32 wks, may be given up to 36 wks if initial window missed):   [] RSV (32-36 wks) (Sept. to end ):   [x] Flu Vaccine: 24    [] Breastfeeding:  [x] Postpartum Birth control method: vasectomy  [x] GBS at 34 - 36 wks: neg  [x] Mode of delivery ( anticipated ): repeat  section at 36-37 wks given h/o classical  section                 Subjective   Good fetal movement.  Denies vaginal bleeding.    Prenatal Provider Abad    OB History    Para Term  AB Living   4 1 0 1 2 1   SAB IAB Ectopic Multiple Live Births   2 0 0 0 1      # Outcome Date GA Lbr Chevy/2nd Weight Sex Type Anes PTL Lv   4 Current            3  24 26w5d  0.66 kg M CS-Classical CSE  SVETA      Complications: Hemorrhage, Uterine Atony      Name: DONALDO GOODBOLIVAR      Apgar1: 2  Apgar5: 4   2 SAB            1 SAB                Past Surgical History:   Procedure Laterality Date     SECTION, CLASSIC         Social History     Tobacco Use    Smoking status: Never     Passive exposure: Never    Smokeless tobacco: Never   Substance Use Topics    Alcohol use: Not Currently       No Known Allergies    Medications Prior to Admission   Medication Sig Dispense Refill Last Dose/Taking    aspirin 81 mg chewable tablet Chew 1 tablet (81 mg) once daily.       prenatal  "vit no.124/iron/folic (PRENATAL VITAMIN ORAL) Take by mouth.        Objective     Last Vitals  Temp Pulse Resp BP MAP O2 Sat   36.7 °C (98.1 °F) 100   (!) 174/106 (labetelol 20mg) 134 (!) 95 %     Blood Pressures         3/5/2025  1101 3/5/2025  1116 3/5/2025  1143       BP: 177/101 177/105 174/106              Physical Exam  General: NAD, mood appropriate  Cardiopulmonary: warm and well perfused, breathing comfortably on room air  Abdomen: Gravid, non-tender  Extremities: Symmetric  Speculum Exam: deferred  Cervix:   /  /         Fetal Monitoring  Baseline: 125 bpm, Variability: moderate moderate,  Accelerations: present and Decelerations: none  Uterine Activity: q4-5 minutes  Interpretation: Reactive    Bedside ultrasound: Yes    Labs in chart were reviewed.  CBC   Recent Labs     03/04/25  1423   WBC 15.8*   HGB 10.6*   HCT 33.6*        CMP   Recent Labs     03/04/25  1423   *   K 4.2      CO2 22   ANIONGAP 12   BUN 14   CREATININE 0.77   EGFR >90   CALCIUM 8.8   ALBUMIN 3.2*   PROT 5.8*   ALKPHOS 100   ALT 34   AST 40*   BILITOT 0.3      Results from last 7 days   Lab Units 03/04/25  1423   WBC AUTO x10*3/uL 15.8*   HEMOGLOBIN g/dL 10.6*   HEMATOCRIT % 33.6*   PLATELETS AUTO x10*3/uL 210   AST U/L 40*   ALT U/L 34   CREATININE mg/dL 0.77        Prenatal labs reviewed, not remarkable.    Prenatals:  Blood Type:   ABO TYPE   Date/Time Value Ref Range Status   03/04/2025 02:34 PM O  Final      Antibody:  No results found for: \"ABORH\"   Ab screen:   ANTIBODY SCREEN   Date/Time Value Ref Range Status   03/04/2025 02:34 PM NEG  Final      Hgb:   Hemoglobin   Date/Time Value Ref Range Status   03/04/2025 02:23 PM 10.6 (L) 12.0 - 16.0 g/dL Final     HEMOGLOBIN   Date/Time Value Ref Range Status   02/18/2025 02:35 PM 10.8 (L) 11.7 - 15.5 g/dL Final      Platelets:   Platelets   Date/Time Value Ref Range Status   03/04/2025 02:23  150 - 450 x10*3/uL Final     PLATELET COUNT   Date/Time Value Ref " "Range Status   2025 02:35  140 - 400 Thousand/uL Final      Rubella: No results found for: \"RUBELLAIGG\"  Varicella:  No results found for: \"VZVG\"   Syphilis:    Syphilis Total Ab   Date/Time Value Ref Range Status   2025 10:45 AM Nonreactive Nonreactive Final     Comment:     No significant level of Treponema pallidum antibody detected.   Repeat testing in 2 to 4 weeks may be considered if early   infection or incubating syphilis infection is suspected.   2024 08:20 AM Nonreactive Nonreactive Final     Comment:     No significant level of Treponema pallidum antibody detected.   Repeat testing in 2 to 4 weeks may be considered if early   infection or incubating syphilis infection is suspected.   2024 09:17 PM Nonreactive Nonreactive Final     Comment:     No significant level of Treponema pallidum antibody detected.   Repeat testing in 2 to 4 weeks may be considered if early   infection or incubating syphilis infection is suspected.        HIV:   HIV 1/2 Antigen/Antibody Screen with Reflex to Confirmation   Date/Time Value Ref Range Status   2024 08:20 AM Nonreactive Nonreactive Final      HBsAg: No results found for: \"HBSAG\"   Hep C:    Hepatitis C AB   Date/Time Value Ref Range Status   2024 08:20 AM Nonreactive Nonreactive Final     Comment:     Results from patients taking biotin supplements or receiving high-dose biotin therapy should be interpreted with caution due to possible interference with this test. Providers may contact their local laboratory for further information.      Gonorrhea:   Neisseria gonorrhea,Amplified   Date/Time Value Ref Range Status   2024 10:46 AM Negative Negative Final      Chlamydia: No results found for: \"CHLAMYDIADNA\"  GBS: No results found for: \"GBS\"     A/P:  Patient is a 34 y.o.  who presents at 37w0d for RCS for GHTN in the setting of prior classical , subsequently found to have sustained severe range blood " pressures and now meeting diagnostic criteria for sPEC.    1) sPEC  -Dx based on sustained severe range blood pressures requiring treatment with short-acting antihypertensives  -s/p 10 mg PO nifedipine and 20 mg IV labetalol  -Continue to treat all severe range Bps  -Will obtain HELLP labs on admission  -Start MgS04 and continue for 24 hours post-partum    2) RCS  -H/o 1 prior classical  in the setting of sPEC at 26 weeks  -T&C X 1 unit  -Partner plans vasectomy    Dispo: To OR when available    Juve Dash MD

## 2025-03-05 NOTE — SIGNIFICANT EVENT
Patient is s/p recovery on L&D. VS wnl and Bps normotensive. Patient is stable for transfer to  to continue MgS04 infusion for 24 hours PP. Will monitor Bps closely and start PO agent if initiated.    Juve Dash MD

## 2025-03-05 NOTE — ANESTHESIA PREPROCEDURE EVALUATION
Patient: Emelyn Perez    Evaluation Method: In-person visit    Procedure Information       Date/Time: 25 1230    Procedure:  DELIVERY - Classical rcs x2 at 37w0d 3/5/25 1230    Location: [facility] / [facility]    Surgeons: Gerry Dash MD            Relevant Problems   Cardiac   (+) Antepartum mild preeclampsia (HHS-HCC)      Pulmonary (within normal limits)      Neuro (within normal limits)      GI (within normal limits)      /Renal (within normal limits)      Liver (within normal limits)      Endocrine (within normal limits)      Hematology   (+) Anemia      GYN   (+) 35 weeks gestation of pregnancy (HHS-HCC)   (+) 37 weeks gestation of pregnancy (HHS-HCC)       Clinical information reviewed:   Tobacco  Allergies  Meds   Med Hx  Surg Hx   Fam Hx  Soc Hx        NPO Detail:  No data recorded     OB/Gyn Evaluation    Present Pregnancy    Patient is pregnant now.  (+) , previous  section, hypertensive disorder of pregnancy - preeclampsia   Obstetric History    (+)  section          Physical Exam    Airway  Mallampati: I  TM distance: >3 FB  Neck ROM: full     Cardiovascular    Dental    Pulmonary    Abdominal          Anesthesia Plan    History of general anesthesia?: no  History of complications of general anesthesia?: no    ASA 3     spinal     Anesthetic plan and risks discussed with patient.  Use of blood products discussed with patient who consented to blood products.    Plan discussed with CAA and attending.

## 2025-03-05 NOTE — ANESTHESIA PROCEDURE NOTES
Spinal Block    Patient location during procedure: OR  Start time: 3/5/2025 12:30 PM  End time: 3/5/2025 12:37 PM  Reason for block: primary anesthetic  Staffing  Performed: TACOS and NIESHA   Authorized by: Bautista Rodriguez MD    Performed by: TACOS Delaney    Preanesthetic Checklist  Completed: patient identified, IV checked, risks and benefits discussed, surgical consent, monitors and equipment checked, pre-op evaluation, timeout performed and sterile techniques followed  Block Timeout  RN/Licensed healthcare professional reads aloud to the Anesthesia provider and entire team: Patient identity, procedure with side and site, patient position, and as applicable the availability of implants/special equipment/special requirements.  Patient on coagulant treatment: no  Timeout performed at: 3/5/2025 12:30 PM  Spinal Block  Patient position: sitting  Prep: ChloraPrep  Sterility prep: cap, drape, gloves, hand hygiene and mask  Sedation level: light sedation  Patient monitoring: blood pressure, continuous pulse oximetry and heart rate  Approach: midline  Vertebral space: L3-4  Injection technique: single-shot  Needle  Needle type: pencil-point   Needle gauge: 25 G  Needle length: 3.5 in  Free flowing CSF: yes    Assessment  Block outcome: block to be assessed in the OR  Procedure assessment: patient tolerated procedure well with no immediate complications

## 2025-03-05 NOTE — CARE PLAN
Problem: Vaginal Birth or  Section  Goal: Fetal and maternal status remain reassuring during the birth process  Outcome: Met  Goal: Tolerate CRB for IOL placement maintenance until dislodgement/removal 12hrs after placement  Outcome: Met  Goal: Prevention of malpresentation/labor dystocia through delivery  Outcome: Met  Goal: Demonstrates labor coping techniques through delivery  Outcome: Met  Goal: Minimal s/sx of HDP and BP<160/110  Outcome: Met  Goal: No s/sx of infection through recovery  Outcome: Met  Goal: No s/sx of hemorrhage through recovery  Outcome: Met     Problem: Hypertensive Disorder of Pregnancy (HDP)  Goal: Minimal s/sx of HDP and BP<160/110  Outcome: Progressing  Goal: Adequate urine output (0.5 ml/kg/hr)  Outcome: Progressing     Problem: Pain - Adult  Goal: Verbalizes/displays adequate comfort level or baseline comfort level  Outcome: Met     Problem: Safety - Adult  Goal: Free from fall injury  Outcome: Met

## 2025-03-05 NOTE — ANESTHESIA POSTPROCEDURE EVALUATION
Patient: Emeyln Perez    Procedure Summary       Date: 25 Room / Location: [facility] / [facility]    Anesthesia Start: 1226 Anesthesia Stop: 1405    Procedure:  DELIVERY Diagnosis:       H/O pre-eclampsia in prior pregnancy, currently pregnant, second trimester ([facility])      Delivery by classical  section ([facility])      (H/O pre-eclampsia in prior pregnancy, currently pregnant, second trimester ([facility]) [O09.292])      (Delivery by classical  section ([facility]) [O82])    Surgeons: Gerry Dash MD Responsible Provider: Bautista Rodriguez MD    Anesthesia Type: CSE ASA Status: 3            Anesthesia Type: CSE    Vitals Value Taken Time   /69 25 1401   Temp 36.5 °C (97.7 °F) 25 1401   Pulse 88 25 1401   Resp 18 25 1405   SpO2 97 % 25 1401       Anesthesia Post Evaluation    Patient location during evaluation: bedside  Patient participation: complete - patient participated  Level of consciousness: awake and alert  Pain management: satisfactory to patient  Multimodal analgesia pain management approach  Airway patency: patent  Cardiovascular status: acceptable  Respiratory status: acceptable  Hydration status: acceptable  Postoperative Nausea and Vomiting: mild      No notable events documented.

## 2025-03-06 LAB
ERYTHROCYTE [DISTWIDTH] IN BLOOD BY AUTOMATED COUNT: 13.4 % (ref 11.5–14.5)
HCT VFR BLD AUTO: 27.7 % (ref 36–46)
HGB BLD-MCNC: 8.8 G/DL (ref 12–16)
MCH RBC QN AUTO: 26.7 PG (ref 26–34)
MCHC RBC AUTO-ENTMCNC: 31.8 G/DL (ref 32–36)
MCV RBC AUTO: 84 FL (ref 80–100)
NRBC BLD-RTO: 0 /100 WBCS (ref 0–0)
PLATELET # BLD AUTO: 214 X10*3/UL (ref 150–450)
RBC # BLD AUTO: 3.29 X10*6/UL (ref 4–5.2)
WBC # BLD AUTO: 18.6 X10*3/UL (ref 4.4–11.3)

## 2025-03-06 PROCEDURE — 36415 COLL VENOUS BLD VENIPUNCTURE: CPT

## 2025-03-06 PROCEDURE — 2500000004 HC RX 250 GENERAL PHARMACY W/ HCPCS (ALT 636 FOR OP/ED)

## 2025-03-06 PROCEDURE — 99199 UNLISTED SPECIAL SVC PX/RPRT: CPT

## 2025-03-06 PROCEDURE — 2500000001 HC RX 250 WO HCPCS SELF ADMINISTERED DRUGS (ALT 637 FOR MEDICARE OP)

## 2025-03-06 PROCEDURE — 2500000002 HC RX 250 W HCPCS SELF ADMINISTERED DRUGS (ALT 637 FOR MEDICARE OP, ALT 636 FOR OP/ED): Performed by: NURSE PRACTITIONER

## 2025-03-06 PROCEDURE — 1100000001 HC PRIVATE ROOM DAILY

## 2025-03-06 PROCEDURE — 2500000002 HC RX 250 W HCPCS SELF ADMINISTERED DRUGS (ALT 637 FOR MEDICARE OP, ALT 636 FOR OP/ED)

## 2025-03-06 PROCEDURE — 85027 COMPLETE CBC AUTOMATED: CPT

## 2025-03-06 RX ORDER — NIFEDIPINE 30 MG/1
30 TABLET, FILM COATED, EXTENDED RELEASE ORAL ONCE
Status: COMPLETED | OUTPATIENT
Start: 2025-03-06 | End: 2025-03-06

## 2025-03-06 RX ORDER — NIFEDIPINE 30 MG/1
30 TABLET, FILM COATED, EXTENDED RELEASE ORAL EVERY 24 HOURS
Status: DISCONTINUED | OUTPATIENT
Start: 2025-03-06 | End: 2025-03-06

## 2025-03-06 RX ORDER — NIFEDIPINE 60 MG/1
60 TABLET, FILM COATED, EXTENDED RELEASE ORAL
Status: DISCONTINUED | OUTPATIENT
Start: 2025-03-07 | End: 2025-03-07

## 2025-03-06 RX ADMIN — ACETAMINOPHEN 975 MG: 325 TABLET ORAL at 02:39

## 2025-03-06 RX ADMIN — ACETAMINOPHEN 975 MG: 325 TABLET ORAL at 20:40

## 2025-03-06 RX ADMIN — KETOROLAC TROMETHAMINE 30 MG: 30 INJECTION, SOLUTION INTRAMUSCULAR; INTRAVENOUS at 02:39

## 2025-03-06 RX ADMIN — IBUPROFEN 600 MG: 600 TABLET, FILM COATED ORAL at 14:50

## 2025-03-06 RX ADMIN — SIMETHICONE 80 MG: 80 TABLET, CHEWABLE ORAL at 14:50

## 2025-03-06 RX ADMIN — ACETAMINOPHEN 975 MG: 325 TABLET ORAL at 14:50

## 2025-03-06 RX ADMIN — NIFEDIPINE 30 MG: 30 TABLET, FILM COATED, EXTENDED RELEASE ORAL at 16:06

## 2025-03-06 RX ADMIN — KETOROLAC TROMETHAMINE 30 MG: 30 INJECTION, SOLUTION INTRAMUSCULAR; INTRAVENOUS at 08:55

## 2025-03-06 RX ADMIN — MAGNESIUM SULFATE HEPTAHYDRATE 2 G/HR: 40 INJECTION, SOLUTION INTRAVENOUS at 04:24

## 2025-03-06 RX ADMIN — ACETAMINOPHEN 975 MG: 325 TABLET ORAL at 08:55

## 2025-03-06 RX ADMIN — IBUPROFEN 600 MG: 600 TABLET, FILM COATED ORAL at 20:40

## 2025-03-06 RX ADMIN — NIFEDIPINE 30 MG: 30 TABLET, FILM COATED, EXTENDED RELEASE ORAL at 09:30

## 2025-03-06 RX ADMIN — OXYCODONE 5 MG: 5 TABLET ORAL at 16:09

## 2025-03-06 ASSESSMENT — PAIN SCALES - GENERAL
PAINLEVEL_OUTOF10: 2
PAINLEVEL_OUTOF10: 6
PAINLEVEL_OUTOF10: 0 - NO PAIN
PAINLEVEL_OUTOF10: 5 - MODERATE PAIN
PAINLEVEL_OUTOF10: 8
PAINLEVEL_OUTOF10: 6

## 2025-03-06 NOTE — ANESTHESIA POSTPROCEDURE EVALUATION
Patient: Emelyn Perez    Procedure Summary       Date: 25 Room / Location: [facility] / [facility]    Anesthesia Start: 1226 Anesthesia Stop: 1405    Procedure:  DELIVERY Diagnosis:       H/O pre-eclampsia in prior pregnancy, currently pregnant, second trimester ([facility])      Delivery by classical  section ([facility])      (H/O pre-eclampsia in prior pregnancy, currently pregnant, second trimester ([facility]) [O09.292])      (Delivery by classical  section ([facility]) [O82])    Surgeons: Gerry Dash MD Responsible Provider: Bautista Rodriguez MD    Anesthesia Type: spinal ASA Status: 3            Anesthesia Type: spinal        Anesthesia Post Evaluation    Patient location during evaluation: bedside  Patient participation: complete - patient participated  Level of consciousness: awake and alert  Pain management: satisfactory to patient  Airway patency: patent  Cardiovascular status: blood pressure returned to baseline and acceptable  Respiratory status: acceptable  Hydration status: acceptable  Postoperative Nausea and Vomiting: none      Emelyn Perez is a 34 y.o., , who had a , Low Transverse delivery on 3/5/2025 at 37w0d and is now POD1.    She had Neuraxial Anesthesia without immediate complications noted.       Pain well controlled    Visit Vitals  BP (!) 141/92   Pulse 78   Temp 36.6 °C (97.9 °F) (Temporal)   Resp 18        Neuraxial site assessed. No visible redness or swelling or drainage. Patient ais on bed rest for Magnesium infusion and able to move all extremities without difficulty. No complaints of nausea/vomiting. Tolerating PO intake well. No s/sx of PDPH.     Anesthesia will sign off     Brennan Christiansen MD    No notable events documented.

## 2025-03-06 NOTE — LACTATION NOTE
Lactation Consultant Note  Lactation Consultation  Reason for Consult: Follow-up assessment, Difficult latch, Infant weight loss  Consultant Name: Sally Solomon RN, IBCLC    Maternal Information  Has mother  before?: Yes  Previous Maternal Breastfeeding Challenges: Exclusive pump and bottle fed, Low milk supply  Infant to breast within first 2 hours of birth?: Yes    Maternal Assessment  Breast Assessment: Medium, Soft, Warm, Compressible  Nipple Assessment: Intact, Short, Red/inflamed (semi-erect with stimulation; with compression nipple flattens)  Alterations in Nipple Condition: Stage I - pain or irritation with no skin break down  Areola Assessment: Normal    Infant Assessment  Infant Behavior: Light sleep, Sleepy, Feeding cues observed, Rooting response, Suckles on and off, needs stimulation  Infant Assessment: Good cupping of tongue, Tongue protrudes over alveolar ridge    Feeding Assessment  Nutrition Source: Breastmilk, Formula (medically indicated), Formula (per mother’s request)  Feeding Method: Nursing at the breast, Feeding expressed breastmilk, Supplemental nursing system  Feeding Position: Cross - cradle, Skin to skin, C - hold, Mother needs assistance with latch/positioning, One side per feeding  Suck/Feeding: Sustained, Tactile stimulation needed, Audible swallowing with stimulaton, Nipple shield used, Supplemented breast  Latch Assessment: Moderate assistance is needed, Areolar attachment, Bursts of sucking, swallowing, and rest, Comfortable with no pain, Optimal angle of mouth opening, Sucks with long jaw movement, Sucking and swallowing, Comfortable latch, Flanged lips, Eagerly grasped on to latch    LATCH TOOL  Latch: Repeated attempts, hold nipple in mouth, stimulate to suck  Audible Swallowing: Spontaneous and intermittent (24 hours old)  Type of Nipple: Everted (After stimulation)  Comfort (Breast/Nipple): Soft/non-tender  Hold (Positioning): Minimal assist, teach one side,  mother does other, staff holds  LATCH Score: 8    Breast Pump  Pump: Hospital grade electric pump  Frequency:  (encouraged to pump both breasts after each attempt/feed at the breast)  Breast Shield Size and Type: 21 mm    Other OB Lactation Tools  Lactation Tools: Nipple shields, Flanges, Other (Comment) (supplemental nursing system (SNS))    Patient Follow-up  Inpatient Lactation Follow-up Needed : Yes  Outpatient Lactation Follow-up: Recommended    Other OB Lactation Documentation  Maternal Risk Factors: Previous low supply,  delivery, Preeclampsia  Infant Risk Factors: Early term birth 37-39 weeks    Recommendations/Summary  Per bedside RN, infant lost weight to now be down 5.9% (corrected with discrepancy) from birth weight and parents agreeable to supplementing with formula. Infant just 100 g over being considered small for gestational age at birth. Per bedside RN, infant not staying at breast for long, sleepy with feed, had discussed possibility of supplemental nursing system (SNS) with parents who are willing to try. I came in room with bedside RN to educated on use of SNS, set up and do feed with them. Mother and father agreeable.     First performed assessment on mother's right breast which revealed short nipple that everts with stimulation but also flattens with compression. Infant also assessed revealing good cupping on tongue and extension of tongue past alveolar ridge. I placed infant with mother in skin to skin the moved him to cross-cradle hold at right breast. Infant showing some interest in latching however could not get infant to latch to nipple. Previous lactation consultant had used nipple shield to achieve a latch with infant and mother and so I asked mother if we could try that again; mother agreeable. Also discussed SNS benefits and use and parents agreeable to trying. Small nipple shield placed on right breast/nipple with SNS under filled with 20 ml of parents choice of Enfamil  "formula for supplement.    Infant latched readily to nipple shield with SNS with areolar attachment, nose and chin touching breast, lips flanged after I helped to manually flange lower lip, sucks with long jaw movement and audible swallows initially on his own then with stimulation. Mother states latch is comfortable and seems reassured that infant is \"actually eating\" and is able to latch. I educated mother on feeding infant on first breast until he unlatches or until tactile stimulation is not keeping him sucking/swallowing at breast. I then recommended burping infant and then trying to latch/feed infant on other breast. After feeding infants at breast, encouraged to pump both breasts using initiate program. I also encouraged mother to call for assistance with set up of SNS for future feeds.  "

## 2025-03-06 NOTE — PROGRESS NOTES
[id number]     Tracy Shelley MRN: [id number]  AGE: 71 year old  ADMIT DATE: 4/13/2022    CODE STATUS: Full Resuscitation  ISOLATION STATUS: No active isolations   DIET: Liquid Clear Diet    ALLERGIES:  Azithromycin, Cephalexin, Erythromycin, Amlodipine, Bupropion, Lisinopril, Methimazole, Nitrofurantoin, Prednisone, Prozac, Sertraline, Sulfamethoxazole-trimethoprim, and Tetracycline     DX:Hematochezia  (primary encounter diagnosis)     Att: Howard Faustin MD  PCP: Ruba Escamilla DO  IP Consult Orders (From admission, onward)             Start     Ordered    04/13/22 0643  Inpatient consult to GI  ONE TIME        Provider:  Harry Carter MD    04/13/22 0643                    BP: (!) 184/75  Temp: 97.7 °F (36.5 °C)  Temp src: Oral  Heart Rate: (!) 59  Resp: 18  SpO2: 96 %  Height: 5' 7\" (170.2 cm)  Weight: 72.3 kg (159 lb 6.3 oz)   Weight change:      No results available in last 24 hours     Creatinine (mg/dL)   Date Value   04/14/2022 1.30 (H)   04/13/2022 1.34 (H)     PTT (seconds)   Date Value   10/29/2018 33 (H)     INR (no units)   Date Value   04/14/2022 2.8     WBC (K/mcL)   Date Value   04/14/2022 5.2   04/13/2022 7.1        I/O last 3 completed shifts:  In: -   Out: 850 [Urine:850]                         IMPORTANT EVENTS THIS SHIFT:  Pt continues to have elevated B/P Diltiazem given,  Dr Faustin aware.  Pt doesn't want any other B/P since she is very allergic to meds.   IMPORTANT EVENTS COMING UP/GOALS (PLEASE INCLUDE WHITE BOARD AND DISCHARGE BOARD UPDATES):       PATIENT SPECIAL NEEDS/ACCOMMODATIONS:                                Postpartum Progress Note    Assessment/Plan   Emelyn Perez is a 34 y.o., , who delivered at 37w0d gestation and is now postpartum day 1. S/p repeat CS in s/o sPEC.     # sPEC  - Dx by SRBPs requiring IV tx  - will start Nifed 30 daily given high mild range Bps most recently  - HELLP labs initially notable for elevated AST, normal on repeat  - Currently asymptomatic  - Lower extremity edema, improved with IV lasix 20mg, will consider PO lasix x 5day if persistent   - cont Mg 2g/hr until 24hr postpartum at 1300, currently no signs/sxs of Mg toxicity     # POD1  - pain well controlled  - fundus firm and nontender  - Acute blood loss anemia, starting Hgb 10.6 >  ml, Hemabate given >8.8 this AM  - minimal lochia, continue to monitor for sign/sxs of anemia   - continue routine postpartum care  - regular diet, advance as tolerated   - handley in place while receiving Magnesium, appropriate UOP    # Feeding - breast  - Continue to encourage breast feeding  - Lactation consult prn     # Postpartum Birth control  - partner plans to get vasectomy      # DVT prophylaxis  - DVT score: 9  - for ppx lovenox  - SCDs  - Ambulation    Pregnancy notable for:  -gHTN  -Hx of classical CS at 26 wk in s/o sPEC     Dispo: Anticipate DC PPD3 pending BP control.    Seen and discussed with Dr. Rory Barriga MD, PGY-1    Assessment & Plan  37 weeks gestation of pregnancy ([facility])    H/O pre-eclampsia in prior pregnancy, currently pregnant, second trimester ([facility])    History of classical  section    Anemia    Pregnancy Problems (from 24 to present)       Problem Noted Diagnosed Resolved    37 weeks gestation of pregnancy ([facility]) 3/5/2025 by Chhaya Robin MD  No    Priority:  Medium       Antepartum mild preeclampsia ([facility]) 2025 by Soumya Harmon MD  No    Priority:  Medium       Overview Addendum 2025 11:47 AM by Gabi Davenport MD     - dx gHTN in triage for MRBP > 4hrs apart  -  HELLP labs neg, P:C 0.23  - weekly HELLP labs ordered  - already has NST scheduled for next week     At 35w2d, she had a P:C 0.33, now mild pree         H/O pre-eclampsia in prior pregnancy, currently pregnant, second trimester ([facility]) 2024 by Radha Abad MD  No    Priority:  Medium       Overview Addendum 2024  9:51 PM by Gabi Davenport MD     -dx at 23.5 wks, managed inpatient until 26.5 wks  -on ASA  -normal baseline labs  -prefers MFM management    Serial growth US:   23w6d US: EFW 751g, 86%, AC 73%         History of classical  section 2024 by Radha Abad MD  No    Priority:  Medium       Overview Addendum 2024  1:03 PM by Radha Abad MD     -classical  section for sPEC, FGR, breech at 26.5  -delivery 36-37 wks by repeat section         35 weeks gestation of pregnancy ([facility]) 2024 by Radha Abad MD  No    Priority:  Medium       Overview Addendum 2025 11:47 AM by Gabi Davenport MD     Desired provider in labor: [] CNM  [x] Physician  [x] Blood Products: [x] Yes, accepts [] No, needs counseling  [x] Initial BMI: 25.68   [x] Prenatal Labs: within normal limits  [x] Cervical Cancer Screening up to date: collected 2024   [x] Rh status: Rh pos  [x] Genetic Screening: see other problem  [x] NT US (11-13 wks): WNL  [x] Baby ASA (if indicated): taking  [x] Pregnancy dated by: US, no LMP     [x] Anatomy US: (19-20 wks): normal   [] Federal Sterilization consent signed (if indicated):  [x] 1hr GCT at 24-28wks: nml  [] Fetal Surveillance (if indicated):  [x] Tdap (27-32 wks, may be given up to 36 wks if initial window missed):   [] RSV (32-36 wks) (Sept. to end of ):   [x] Flu Vaccine: 24    [] Breastfeeding:  [x] Postpartum Birth control method: vasectomy  [x] GBS at 34 - 36 wks: neg  [x] Mode of delivery ( anticipated ): repeat  section at 36-37 wks given h/o classical  section               Subjective   Pt reporting 3/10 HA and  nausea. Had an episode of emesis on arrival to floor. Denies change in vision, SOB, CP and RUQ pain. Pain is controlled on pain regimen. Mild lochia. Hasn't passed flatus yet. Has handley in place while on Mag gtt. Attempting to pump.     Objective   Allergies:   Patient has no known allergies.         Last Vitals:  Temp Pulse Resp BP MAP Pulse Ox   36.7 °C (98.1 °F) 82 18 (!) 145/96 112 98 %     Vitals Min/Max Last 24 Hours:  Temp  Min: 36.3 °C (97.3 °F)  Max: 36.9 °C (98.4 °F)  Pulse  Min: 64  Max: 100  Resp  Min: 12  Max: 18  BP  Min: 124/81  Max: 177/105  MAP (mmHg)  Min: 92  Max: 135    Intake/Output:     Intake/Output Summary (Last 24 hours) at 3/6/2025 0824  Last data filed at 3/6/2025 0635  Gross per 24 hour   Intake 3972 ml   Output 4453 ml   Net -481 ml       Physical Exam:  General: no acute distress   HEENT: normocephalic, atraumatic   Cards: warm and well perfused   Pulm: normal work of breathing on RA  Abdomen: soft, appropriate tenderness, fundus below umbilcius, no fundal tenderness, incision c/d/i  : Handley catheter in place with yellow urine output   Extremities: no lesions, skin discoloration or calf tenderness, trace pitting edema  Neuro: no focal deficits, 2+ brachial reflexes bilaterally     Lab Data:  Admission on 03/05/2025   Component Date Value Ref Range Status    Syphilis Total Ab 03/05/2025 Nonreactive  Nonreactive Final    No significant level of Treponema pallidum antibody detected.   Repeat testing in 2 to 4 weeks may be considered if early   infection or incubating syphilis infection is suspected.    WBC 03/05/2025 17.2 (H)  4.4 - 11.3 x10*3/uL Final    nRBC 03/05/2025 0.0  0.0 - 0.0 /100 WBCs Final    RBC 03/05/2025 3.65 (L)  4.00 - 5.20 x10*6/uL Final    Hemoglobin 03/05/2025 9.7 (L)  12.0 - 16.0 g/dL Final    Hematocrit 03/05/2025 31.5 (L)  36.0 - 46.0 % Final    MCV 03/05/2025 86  80 - 100 fL Final    MCH 03/05/2025 26.6  26.0 - 34.0 pg Final    MCHC 03/05/2025 30.8 (L)  32.0 -  36.0 g/dL Final    RDW 03/05/2025 13.2  11.5 - 14.5 % Final    Platelets 03/05/2025 211  150 - 450 x10*3/uL Final    Glucose 03/05/2025 89  74 - 99 mg/dL Final    Sodium 03/05/2025 134 (L)  136 - 145 mmol/L Final    Potassium 03/05/2025 4.0  3.5 - 5.3 mmol/L Final    Chloride 03/05/2025 105  98 - 107 mmol/L Final    Bicarbonate 03/05/2025 19 (L)  21 - 32 mmol/L Final    Anion Gap 03/05/2025 14  10 - 20 mmol/L Final    Urea Nitrogen 03/05/2025 14  6 - 23 mg/dL Final    Creatinine 03/05/2025 0.65  0.50 - 1.05 mg/dL Final    eGFR 03/05/2025 >90  >60 mL/min/1.73m*2 Final    Calculations of estimated GFR are performed using the 2021 CKD-EPI Study Refit equation without the race variable for the IDMS-Traceable creatinine methods.  https://jasn.asnjournals.org/content/early/2021/09/22/ASN.6108824388    Calcium 03/05/2025 8.1 (L)  8.6 - 10.6 mg/dL Final    Albumin 03/05/2025 2.8 (L)  3.4 - 5.0 g/dL Final    Alkaline Phosphatase 03/05/2025 100  33 - 110 U/L Final    Total Protein 03/05/2025 5.5 (L)  6.4 - 8.2 g/dL Final    AST 03/05/2025 29  9 - 39 U/L Final    Bilirubin, Total 03/05/2025 0.2  0.0 - 1.2 mg/dL Final    ALT 03/05/2025 29  7 - 45 U/L Final    Patients treated with Sulfasalazine may generate falsely decreased results for ALT.    PRODUCT CODE 03/05/2025 [id number]   Final    Unit Number 03/05/2025 [id number]   Final    Unit ABO 03/05/2025 O   Final    Unit RH 03/05/2025 POS   Final    XM INTEP 03/05/2025 COMP   Final    Dispense Status 03/05/2025 XM   Final    Blood Expiration Date 03/05/2025 4/2/2025 11:59:00 PM EDT   Final    PRODUCT BLOOD TYPE 03/05/2025 5100   Final    UNIT VOLUME 03/05/2025 350   Final    WBC 03/05/2025 23.3 (H)  4.4 - 11.3 x10*3/uL Final    nRBC 03/05/2025 0.0  0.0 - 0.0 /100 WBCs Final    RBC 03/05/2025 3.17 (L)  4.00 - 5.20 x10*6/uL Final    Hemoglobin 03/05/2025 8.5 (L)  12.0 - 16.0 g/dL Final    Hematocrit 03/05/2025 27.6 (L)  36.0 - 46.0 % Final    MCV 03/05/2025 87  80 - 100 fL  Final    MCH 03/05/2025 26.8  26.0 - 34.0 pg Final    MCHC 03/05/2025 30.8 (L)  32.0 - 36.0 g/dL Final    RDW 03/05/2025 13.2  11.5 - 14.5 % Final    Platelets 03/05/2025 206  150 - 450 x10*3/uL Final    Glucose 03/05/2025 96  74 - 99 mg/dL Final    Sodium 03/05/2025 131 (L)  136 - 145 mmol/L Final    Potassium 03/05/2025 4.3  3.5 - 5.3 mmol/L Final    Chloride 03/05/2025 103  98 - 107 mmol/L Final    Bicarbonate 03/05/2025 20 (L)  21 - 32 mmol/L Final    Anion Gap 03/05/2025 12  10 - 20 mmol/L Final    Urea Nitrogen 03/05/2025 16  6 - 23 mg/dL Final    Creatinine 03/05/2025 0.62  0.50 - 1.05 mg/dL Final    eGFR 03/05/2025 >90  >60 mL/min/1.73m*2 Final    Calculations of estimated GFR are performed using the 2021 CKD-EPI Study Refit equation without the race variable for the IDMS-Traceable creatinine methods.  https://jasn.asnjournals.org/content/early/2021/09/22/ASN.6559417474    Calcium 03/05/2025 7.5 (L)  8.6 - 10.6 mg/dL Final    Albumin 03/05/2025 2.7 (L)  3.4 - 5.0 g/dL Final    Alkaline Phosphatase 03/05/2025 92  33 - 110 U/L Final    Total Protein 03/05/2025 4.9 (L)  6.4 - 8.2 g/dL Final    AST 03/05/2025 26  9 - 39 U/L Final    Bilirubin, Total 03/05/2025 0.2  0.0 - 1.2 mg/dL Final    ALT 03/05/2025 26  7 - 45 U/L Final    Patients treated with Sulfasalazine may generate falsely decreased results for ALT.    WBC 03/06/2025 18.6 (H)  4.4 - 11.3 x10*3/uL Final    nRBC 03/06/2025 0.0  0.0 - 0.0 /100 WBCs Final    RBC 03/06/2025 3.29 (L)  4.00 - 5.20 x10*6/uL Final    Hemoglobin 03/06/2025 8.8 (L)  12.0 - 16.0 g/dL Final    Hematocrit 03/06/2025 27.7 (L)  36.0 - 46.0 % Final    MCV 03/06/2025 84  80 - 100 fL Final    MCH 03/06/2025 26.7  26.0 - 34.0 pg Final    MCHC 03/06/2025 31.8 (L)  32.0 - 36.0 g/dL Final    RDW 03/06/2025 13.4  11.5 - 14.5 % Final    Platelets 03/06/2025 214  150 - 450 x10*3/uL Final    Magnesium 03/05/2025 5.33 (HH)  1.60 - 2.40 mg/dL Final

## 2025-03-06 NOTE — LACTATION NOTE
Lactation Consultant Note  Lactation Consultation  Reason for Consult: Initial assessment  Consultant Name: Ifeoma Rivera RN IBCLC    Maternal Information  Has mother  before?: Yes  How long did the mother previously breastfeed?: pumped exclusively for 2 months with her first child who was born at 27 weeks and in NICU  Previous Maternal Breastfeeding Challenges: Low milk supply (stated the most she pumped was 1.5 oz at a time)  Infant to breast within first 2 hours of birth?: Yes (attempted, pumped)    Maternal Assessment  Breast Assessment: Medium, Symmetrical, Compressible (expressible bilat)  Nipple Assessment: Erect with stimulation, Short  Areola Assessment: Normal    Infant Assessment  Infant Behavior: Sleepy, Feeding cues observed, Content after feeding  Infant Assessment: Good cupping of tongue, Tongue protrudes over alveolar ridge    Feeding Assessment  Nutrition Source: Breastmilk  Feeding Method: Syringe feeding, Nursing at the breast, Feeding expressed breastmilk  Feeding Position: Mother needs assistance with latch/positioning, Skin to skin, Cross - cradle, C - hold, Breast sandwich  Suck/Feeding: Sustained, Unsustained, Tactile stimulation needed (sustained with nipple shield)  Latch Assessment: Instructed on deep latch, Maximum assistance is needed, Deep latch obtained, Sucks with long jaw movement, Comfortable latch, Flanged lips, Bursts of sucking, swallowing, and rest    LATCH TOOL  Latch: Repeated attempts, hold nipple in mouth, stimulate to suck  Audible Swallowing: A few with stimulation  Type of Nipple: Everted (After stimulation)  Comfort (Breast/Nipple): Soft/non-tender  Hold (Positioning): Full assist, staff holds infant at breast  LATCH Score: 6    Breast Pump  Pump: Hospital grade electric pump  Frequency:  (pumping after each breastfeed/attempted breastfeed)  Duration: Initiate phase  Breast Shield Size and Type: 21 mm (measured mother's nipples at 19mm bilat)    Other OB Lactation  Tools  Lactation Tools: Flanges, Nipple shields    Patient Follow-up  Inpatient Lactation Follow-up Needed : Yes  Outpatient Lactation Follow-up: Recommended    Other OB Lactation Documentation  Maternal Risk Factors: Preeclampsia, Age over 30, primiparity, Hypertension,  delivery  Infant Risk Factors: Early term birth 37-39 weeks    Recommendations/Summary  Mother had asked to see lactation for assistance with latching infant. Mother verbalized infant has been latching okay but will suck a couple times before falling asleep or coming off the breast. Upon assessment mother's nipples are short/flat but erect with stimulation, more so on the R side. Mother stated she has been pumping after attempted feedings and has produced from 1-4ml of colostrum. Mother's breast are expressible bilat with hand expression. Infant last fed at 0540 and is swaddled and sleeping at time of visit. I offered mother assistance with breastfeeding at this time, mother agreeable.    I unswaddled infant. With suck assessment infant cupped tongue well, infant requiring stimulation to his palate to sustain a rhythmic sucking pattern. I assisted mother to position infant in a cross cradle hold at the L breast. We attempted multiple times and while infant was opening his mouth and eager to latch, when infant latched infant required stimulation to suck, would suck a couple times and then come off the breast. I tried keeping mother's breast tissue compressed but infant was unable to get into a rhythmic sucking pattern. We briefly attempted a nipple shield on this breast but were unable to keep it on well due to being knocked off but infant's puls ox. Infant getting a little frustrated at the breast so I suggested we try the R breast at this time. We first attempted to latch without the nipple shield with the same results. Using a size 24mm nipple shield (that was already in the room-mother had attempted with this a couple times overnight but  infant did not sustain a latch). Infant latched well with areolar attachment, nose and chin touching breast, lips flanged, sustained his latch and sucked with long jaw movements. Mother stated latch felt a little pinchy but with a minor adjustment of infant's lower jaw mother stated this improved and latch was comfortable. Infant still sluggish at the breast and requiring stimulation, but with stimulation was able to get into a rhythmic sucking pattern and swallows noted. Infant fed for about 10 min before tiring, no longer responding with sucking with stimulation. We broke infant's latch and infant was sleepy and content next to mother. Colostrum noted in nipple shield. Mother planning to pump at this time.     I educated mother on various breastfeeding topics including typical  infant feeding cues, feeding infant on demand based on feeding cues, and typical feeding patterns in the first 24 hours of life. We discussed feeding infant frequently with a goal of 8-12 feedings in a 24 hour period. If infant not showing feeding cues and it has been 3 hours since infant's last feed or attempted feed,  I encouraged mother to place infant skin to skin.  I also reviewed the benefits of skin to skin. I reviewed the positioning of baby at the breast, hand placement on baby and on breast, expression of colostrum to the nipple prior to latching, and latching technique. We discussed the characteristics and benefits of a deep and proper latch. I encouraged mother to use pillow support, and reviewed the use of breast compression and stimulation to keep the baby feeding at the breast longer. I encouraged mother to let expressed colostrum air dry on her nipple after feedings. I reviewed application and cleaning of nipple shield, as well as the need to pump after each breastfeed the nipple shield is used.    I reviewed the use of symphony pump initiate program, cleaning and care of pump parts, use of heat and massage prior to  pumping and milk storage guidelines. Gave mother PI sheet 123 and Care and Cleaning of Pump Parts handout for her reference. I explained to parents that infants born at 37 weeks gestational age can sometimes feed like late  infants and have less stamina for full feedings at the breast. For this reason, I recommended mother continue to pump after feedings that the nipple shield is used, or if infant is sleepy and does not feed effectively at the breast.     Mother has a breast pump for home use and I reviewed outpatient lactation resources available to her. I encouraged her to call for any questions, concerns and assistance as needed.

## 2025-03-07 DIAGNOSIS — O13.3 GESTATIONAL HYPERTENSION, THIRD TRIMESTER (HHS-HCC): ICD-10-CM

## 2025-03-07 PROCEDURE — 2500000002 HC RX 250 W HCPCS SELF ADMINISTERED DRUGS (ALT 637 FOR MEDICARE OP, ALT 636 FOR OP/ED): Performed by: NURSE PRACTITIONER

## 2025-03-07 PROCEDURE — 2500000004 HC RX 250 GENERAL PHARMACY W/ HCPCS (ALT 636 FOR OP/ED)

## 2025-03-07 PROCEDURE — 2500000001 HC RX 250 WO HCPCS SELF ADMINISTERED DRUGS (ALT 637 FOR MEDICARE OP)

## 2025-03-07 PROCEDURE — 1100000001 HC PRIVATE ROOM DAILY

## 2025-03-07 RX ORDER — NIFEDIPINE 30 MG/1
30 TABLET, FILM COATED, EXTENDED RELEASE ORAL ONCE
Status: COMPLETED | OUTPATIENT
Start: 2025-03-07 | End: 2025-03-07

## 2025-03-07 RX ORDER — NIFEDIPINE 90 MG/1
90 TABLET, EXTENDED RELEASE ORAL
Status: DISCONTINUED | OUTPATIENT
Start: 2025-03-08 | End: 2025-03-07

## 2025-03-07 RX ORDER — NIFEDIPINE 60 MG/1
60 TABLET, FILM COATED, EXTENDED RELEASE ORAL EVERY 12 HOURS
Status: DISCONTINUED | OUTPATIENT
Start: 2025-03-08 | End: 2025-03-08 | Stop reason: HOSPADM

## 2025-03-07 RX ORDER — LABETALOL 200 MG/1
200 TABLET, FILM COATED ORAL 2 TIMES DAILY
Status: DISCONTINUED | OUTPATIENT
Start: 2025-03-07 | End: 2025-03-08

## 2025-03-07 RX ADMIN — IBUPROFEN 600 MG: 600 TABLET, FILM COATED ORAL at 02:19

## 2025-03-07 RX ADMIN — ACETAMINOPHEN 975 MG: 325 TABLET ORAL at 20:44

## 2025-03-07 RX ADMIN — NIFEDIPINE 30 MG: 30 TABLET, FILM COATED, EXTENDED RELEASE ORAL at 13:56

## 2025-03-07 RX ADMIN — IBUPROFEN 600 MG: 600 TABLET, FILM COATED ORAL at 20:44

## 2025-03-07 RX ADMIN — NIFEDIPINE 30 MG: 30 TABLET, FILM COATED, EXTENDED RELEASE ORAL at 19:12

## 2025-03-07 RX ADMIN — ACETAMINOPHEN 975 MG: 325 TABLET ORAL at 08:39

## 2025-03-07 RX ADMIN — OXYCODONE 5 MG: 5 TABLET ORAL at 03:06

## 2025-03-07 RX ADMIN — IBUPROFEN 600 MG: 600 TABLET, FILM COATED ORAL at 08:39

## 2025-03-07 RX ADMIN — LABETALOL HYDROCHLORIDE 200 MG: 200 TABLET, FILM COATED ORAL at 21:23

## 2025-03-07 RX ADMIN — ACETAMINOPHEN 975 MG: 325 TABLET ORAL at 02:19

## 2025-03-07 RX ADMIN — ACETAMINOPHEN 975 MG: 325 TABLET ORAL at 14:58

## 2025-03-07 RX ADMIN — POLYETHYLENE GLYCOL 3350 17 G: 17 POWDER, FOR SOLUTION ORAL at 11:15

## 2025-03-07 RX ADMIN — NIFEDIPINE 60 MG: 60 TABLET, FILM COATED, EXTENDED RELEASE ORAL at 07:01

## 2025-03-07 RX ADMIN — IBUPROFEN 600 MG: 600 TABLET, FILM COATED ORAL at 14:59

## 2025-03-07 ASSESSMENT — PAIN SCALES - GENERAL
PAINLEVEL_OUTOF10: 0 - NO PAIN
PAINLEVEL_OUTOF10: 4
PAINLEVEL_OUTOF10: 7
PAINLEVEL_OUTOF10: 0 - NO PAIN

## 2025-03-07 ASSESSMENT — PAIN DESCRIPTION - DESCRIPTORS: DESCRIPTORS: TINGLING

## 2025-03-07 NOTE — PROGRESS NOTES
Postpartum Progress Note    Assessment/Plan   Emelyn Perez is a 34 y.o., , who delivered at 37w0d gestation    Now PPD#2 s/p , Low Transverse on 3/5/2025  - Continue routine postpartum care  - Pain well controlled on po medications  - DVT risk score DVT Score (IF A SCORE IS NOT CALCULATING, MUST SELECT A BMI TO COMPLETE): 9 , ppx with SCDs, ambulation, and lovenox  - RH positive, rhogam not indicated      - Hgb:   Results from last 7 days   Lab Units 25  0712 25  1407   HEMOGLOBIN g/dL 8.8* 8.5* 9.7*      Acute blood loss anemia  -  -Trend as noted above  -Asymptomatic   -Will plan for PO iron on discharge    PEC w/ SF  -s/p mag  - dx by severe range blood pressure   - HELLP labs notable for elevated AST 40  > 29> 26   - Mild range blood pressures today 150's/ 90's  -Current regimen nifedipine 60 mg increased to 90 mg  -Lasix IV 20 mg x1 on 3/5  - asymptomatic   - discussed 3 day stay and pt states understanding   - BP cuff for home at discharge, to monitor BID      Maternal Well-Being  - Vitals stable  - All questions and concerns address    [place] [facility]  - Breastfeeding/pumping encouraged  - Lactation consult prn    Contraception  - vasectomy  - Education provided    Dispo  - Anticipate d/c on PPD #3 if meeting all postpartum milestones  - Follow-up in 2-5 days for BP check  - Follow-up in 1-2wks for incision check  - Follow-up in 4-6wks with primary EMILIANA Bond-CNP     Assessment & Plan  37 weeks gestation of pregnancy ([facility])    H/O pre-eclampsia in prior pregnancy, currently pregnant, second trimester ([facility])    History of classical  section    Anemia    Pregnancy Problems (from 24 to present)       Problem Noted Diagnosed Resolved    37 weeks gestation of pregnancy ([facility]) 3/5/2025 by Chhaya Robin MD  No    Priority:  Medium       Antepartum mild preeclampsia ([facility]) 2025 by Soumya Harmon MD  No     Priority:  Medium       Overview Addendum 2025 11:47 AM by Gabi Davenport MD     - dx gHTN in triage for MRBP > 4hrs apart  - HELLP labs neg, P:C 0.23  - weekly HELLP labs ordered  - already has NST scheduled for next week     At 35w2d, she had a P:C 0.33, now mild pree         H/O pre-eclampsia in prior pregnancy, currently pregnant, second trimester ([facility]) 2024 by Radha Abad MD  No    Priority:  Medium       Overview Addendum 2024  9:51 PM by Gabi Davenport MD     -dx at 23.5 wks, managed inpatient until 26.5 wks  -on ASA  -normal baseline labs  -prefers MFM management    Serial growth US:   23w6d US: EFW 751g, 86%, AC 73%         History of classical  section 2024 by Radha Abad MD  No    Priority:  Medium       Overview Addendum 2024  1:03 PM by Radha Abad MD     -classical  section for sPEC, FGR, breech at 26.5  -delivery 36-37 wks by repeat section         35 weeks gestation of pregnancy ([facility]) 2024 by Radha Abad MD  No    Priority:  Medium       Overview Addendum 2025 11:47 AM by Gabi Davenport MD     Desired provider in labor: [] CNM  [x] Physician  [x] Blood Products: [x] Yes, accepts [] No, needs counseling  [x] Initial BMI: 25.68   [x] Prenatal Labs: within normal limits  [x] Cervical Cancer Screening up to date: collected 2024   [x] Rh status: Rh pos  [x] Genetic Screening: see other problem  [x] NT US (11-13 wks): WNL  [x] Baby ASA (if indicated): taking  [x] Pregnancy dated by: US, no LMP     [x] Anatomy US: (19-20 wks): normal   [] Federal Sterilization consent signed (if indicated):  [x] 1hr GCT at 24-28wks: nml  [] Fetal Surveillance (if indicated):  [x] Tdap (27-32 wks, may be given up to 36 wks if initial window missed):   [] RSV (32-36 wks) (Sept. to end ):   [x] Flu Vaccine: 24    [] Breastfeeding:  [x] Postpartum Birth control method: vasectomy  [x] GBS at 34 - 36 wks: neg  [x] Mode of delivery (  anticipated ): repeat  section at 36-37 wks given h/o classical  section                 Subjective     Emelyn Perez is PPD#2 s/p  who reports feeling overall well.  No acute events overnight.  Voiding spontaneously, passing flatus.  Pain well controlled on PO meds.  Light lochia. Tolerating diet.  Denies HA, SOB, RUQ pain, vision changes.  Denies dizziness/lightheadedness, SOB, palpitations, extreme fatigue, heavy bleeding      Objective   Allergies:   Patient has no known allergies.         Last Vitals:  Temp Pulse Resp BP MAP Pulse Ox   36.9 °C (98.4 °F) 89 16 (!) 152/86 (RN Blas notified of BP)   96 %     Vitals Min/Max Last 24 Hours:  Temp  Min: 36.9 °C (98.4 °F)  Max: 37.4 °C (99.3 °F)  Pulse  Min: 69  Max: 91  Resp  Min: 16  Max: 20  BP  Min: 137/83  Max: 155/93    Intake/Output:     Intake/Output Summary (Last 24 hours) at 3/7/2025 1458  Last data filed at 3/6/2025 1800  Gross per 24 hour   Intake --   Output 900 ml   Net -900 ml       Physical Exam:  General: examination reveals a well developed, well nourished, female, in no acute distress. She is alert and cooperative.  HEENT: external ears normal. Nose normal, no erythema or discharge.  Neck: supple, no significant adenopathy  Lungs: breathing even and unlabored, lungs clear  Cardiac: warm and well perfused, heart rate regular  Fundus: firm and below umbilicus, lochia light  Abdominal: soft, non-tender, non-distended, bowel sounds active  Extremities: no redness or tenderness in the calves or thighs, edema: 1+ pitting BLE  Neurological: alert, oriented, normal speech, no focal findings or movement disorder noted.  Psychological: awake and alert; oriented to person, place, and time.  Skin: incision clean, dry, intact, well approximated, no redness, drainage, or warmth     Lab Data:  Labs in chart were reviewed.

## 2025-03-07 NOTE — CARE PLAN
Problem: Hypertensive Disorder of Pregnancy (HDP)  Goal: Minimal s/sx of HDP and BP<160/110  Outcome: Progressing     Problem: Postpartum  Goal: Experiences normal postpartum course  Outcome: Progressing  Goal: Incisions, wounds, or drain sites healing without S/S of infection  Outcome: Progressing  Goal: No s/sx infection  Outcome: Progressing     The patient's goals for the shift include getting rest and latching baby consistently    The clinical goals for the shift include VSS, bleeding and swelling minimal, pain controlled with medications, breastfeeding.    Over the shift the patient made progress to goals above. Patient maintained VS WNL, bleeding and swelling minimal, and pain controlled with medications. Patient is motivated to continue learning about breastfeeding and latching infant.

## 2025-03-07 NOTE — CARE PLAN
Problem: Hypertensive Disorder of Pregnancy (HDP)  Goal: Minimal s/sx of HDP and BP<160/110  Outcome: Progressing     Problem: Postpartum  Goal: Experiences normal postpartum course  Outcome: Progressing  Goal: Incisions, wounds, or drain sites healing without S/S of infection  Outcome: Progressing  Goal: No s/sx infection  Outcome: Progressing     Patient with some mild range BP during this shift, so nifedipine was increased this afternoon. Patient asymptomatic. VS and assessment otherwise stable. Minimal bloody drainage from incision this evening, steri strips placed and incision no longer draining. Breastfeeding and pumping. Will continue to monitor.

## 2025-03-07 NOTE — LACTATION NOTE
Lactation Consultant Note  Lactation Consultation  Reason for Consult: Follow-up assessment  Consultant Name: Neema Garay RN, IBCLC    Maternal Information  Has mother  before?: Yes    Maternal Assessment  Breast Assessment: Medium, Filling, Compressible  Nipple Assessment: Intact, Short, Erect  Areola Assessment: Normal    Infant Assessment  Infant Behavior: Awake, Active alert, Rooting response, Feeding cues observed  Infant Assessment: Jaundice, Premature, Tongue protrudes over alveolar ridge, Good cupping of tongue    Feeding Assessment  Nutrition Source: Breastmilk, Formula (per mother’s request), Formula (medically indicated)  Feeding Method: Nursing at the breast, Paced bottle, Supplemental nursing system  Feeding Position: Football/seated, One side per feeding, Nipple to nose, Mother needs assistance with latch/positioning, Chin tucked into breast, Nose lightly touching breast  Suck/Feeding: Tactile stimulation needed, Supplemented breast, Content after feeding  Latch Assessment: Maximum assistance is needed, Areolar attachment, Deep latch obtained, Optimal angle of mouth opening, Bursts of sucking, swallowing, and rest, Chin and lower lip contact breast first, Flanged lips, Correct tongue position    LATCH TOOL  Latch: Repeated attempts, hold nipple in mouth, stimulate to suck  Audible Swallowing: A few with stimulation  Type of Nipple: Everted (After stimulation)  Comfort (Breast/Nipple): Soft/non-tender  Hold (Positioning): Full assist, staff holds infant at breast  LATCH Score: 6    Breast Pump  Pump: Hospital grade electric pump  Frequency: 8-10 times per day  Duration: Initiate phase  Breast Shield Size and Type: 21 mm  Units of Volume: Drops, 5ml with session after feeding.    Other OB Lactation Tools       Patient Follow-up  Inpatient Lactation Follow-up Needed : Yes  Outpatient Lactation Follow-up: Recommended    Other OB Lactation Documentation  Maternal Risk Factors: Previous low supply,   delivery, Preeclampsia  Infant Risk Factors: Early term birth 37-39 weeks    Recommendations/Summary  Worked with mom to get infand latched. Baby was showing signs of being gassy, arching back, difficulty staying latched. Baby was burped a few times and passed some gas, then brought back to mom's breast.     Baby was well aligned, football hold, skin to skin, on right breast. Baby's ear-shoulder-hip in line and baby's belly in towards mom.  With baby's arms on either side of mom's breast, showed mom how to sandwich her areola, tap her nipple under baby's nose and wait for baby's mouth to open before pulling baby in for a deep latch. Eventually after several attempts, expressing colostrum, pressing mom's nipple to the roof of baby's mouth, baby was able to get a nice deep latch. Deep latch, areolar attatchment, lips flanged, nose and chin touching the breast. Baby sucked rhythmically for short bursts. Tactile stimulation and breast compression shown and mom was able to return demonstrate this techniques. A couple swallows were noted, but baby was slowing down quickly. Decision was made to add in the SNS, which parents has some experience with yesterday, but struggled with overnight.      Baby stayed latched well and took a total of 10ml of formula via SNS. Mom then pumped and got out 5ml.     Plan is to supplement with mom's pumped milk at next feed before formula is given. We will attempt to latch baby without SNS for about 5-10mins, then, if baby is minimally active again, we will add in 5ml of pumped milk into the SNS, and reassess baby's feeding. If baby stays latched and rhythmically sucking, we will allow baby to continue feeding, doing tactile stimulation and breast compression to encourage as much swallowing as possible. If baby slows, or gives minimal effort, but still acting hungry, then 15ml of formula will be offered via SNS.    And mom will pump after feeding.      Will work with parents through  this process for next feed and parents are encouraged to call for further support as needed throughout their stay.     Strongly recommend follow up with outpatient lactation after discharge, especially once milk is fully in and she is pumping comparable amounts to what baby is being supplemented with.

## 2025-03-08 VITALS
BODY MASS INDEX: 34.39 KG/M2 | WEIGHT: 219.58 LBS | HEART RATE: 100 BPM | TEMPERATURE: 97.9 F | DIASTOLIC BLOOD PRESSURE: 81 MMHG | OXYGEN SATURATION: 95 % | RESPIRATION RATE: 18 BRPM | SYSTOLIC BLOOD PRESSURE: 133 MMHG

## 2025-03-08 PROCEDURE — 2500000001 HC RX 250 WO HCPCS SELF ADMINISTERED DRUGS (ALT 637 FOR MEDICARE OP)

## 2025-03-08 PROCEDURE — 99238 HOSP IP/OBS DSCHRG MGMT 30/<: CPT

## 2025-03-08 PROCEDURE — 2500000002 HC RX 250 W HCPCS SELF ADMINISTERED DRUGS (ALT 637 FOR MEDICARE OP, ALT 636 FOR OP/ED): Performed by: NURSE PRACTITIONER

## 2025-03-08 RX ORDER — IBUPROFEN 600 MG/1
600 TABLET ORAL EVERY 6 HOURS PRN
Qty: 60 TABLET | Refills: 0 | Status: SHIPPED | OUTPATIENT
Start: 2025-03-08

## 2025-03-08 RX ORDER — LABETALOL 200 MG/1
200 TABLET, FILM COATED ORAL ONCE
Status: COMPLETED | OUTPATIENT
Start: 2025-03-08 | End: 2025-03-08

## 2025-03-08 RX ORDER — ACETAMINOPHEN 325 MG/1
975 TABLET ORAL EVERY 6 HOURS PRN
Qty: 120 TABLET | Refills: 0 | Status: SHIPPED | OUTPATIENT
Start: 2025-03-08

## 2025-03-08 RX ORDER — LABETALOL 200 MG/1
400 TABLET, FILM COATED ORAL 2 TIMES DAILY
Status: DISCONTINUED | OUTPATIENT
Start: 2025-03-08 | End: 2025-03-08 | Stop reason: HOSPADM

## 2025-03-08 RX ORDER — NIFEDIPINE 60 MG/1
60 TABLET, FILM COATED, EXTENDED RELEASE ORAL EVERY 12 HOURS
Qty: 80 TABLET | Refills: 0 | Status: SHIPPED | OUTPATIENT
Start: 2025-03-08

## 2025-03-08 RX ORDER — OXYCODONE HYDROCHLORIDE 5 MG/1
5 TABLET ORAL EVERY 4 HOURS PRN
Qty: 5 TABLET | Refills: 0 | Status: SHIPPED | OUTPATIENT
Start: 2025-03-08

## 2025-03-08 RX ORDER — NALOXONE HYDROCHLORIDE 4 MG/.1ML
1 SPRAY NASAL AS NEEDED
Qty: 1 EACH | Refills: 0 | Status: SHIPPED | OUTPATIENT
Start: 2025-03-08

## 2025-03-08 RX ORDER — FERROUS SULFATE 325(65) MG
325 TABLET ORAL
Qty: 30 TABLET | Refills: 0 | Status: SHIPPED | OUTPATIENT
Start: 2025-03-08

## 2025-03-08 RX ORDER — LABETALOL 200 MG/1
400 TABLET, FILM COATED ORAL 2 TIMES DAILY
Qty: 160 TABLET | Refills: 0 | Status: SHIPPED | OUTPATIENT
Start: 2025-03-08

## 2025-03-08 RX ADMIN — ACETAMINOPHEN 975 MG: 325 TABLET ORAL at 08:31

## 2025-03-08 RX ADMIN — ACETAMINOPHEN 975 MG: 325 TABLET ORAL at 14:52

## 2025-03-08 RX ADMIN — IBUPROFEN 600 MG: 600 TABLET, FILM COATED ORAL at 08:31

## 2025-03-08 RX ADMIN — NIFEDIPINE 60 MG: 60 TABLET, FILM COATED, EXTENDED RELEASE ORAL at 06:39

## 2025-03-08 RX ADMIN — IBUPROFEN 600 MG: 600 TABLET, FILM COATED ORAL at 14:52

## 2025-03-08 RX ADMIN — ACETAMINOPHEN 975 MG: 325 TABLET ORAL at 02:41

## 2025-03-08 RX ADMIN — LABETALOL HYDROCHLORIDE 200 MG: 200 TABLET, FILM COATED ORAL at 09:09

## 2025-03-08 RX ADMIN — IBUPROFEN 600 MG: 600 TABLET, FILM COATED ORAL at 02:41

## 2025-03-08 RX ADMIN — LABETALOL HYDROCHLORIDE 200 MG: 200 TABLET, FILM COATED ORAL at 10:23

## 2025-03-08 ASSESSMENT — PAIN SCALES - GENERAL
PAINLEVEL_OUTOF10: 2
PAINLEVEL_OUTOF10: 0 - NO PAIN
PAINLEVEL_OUTOF10: 6
PAINLEVEL_OUTOF10: 0 - NO PAIN

## 2025-03-08 ASSESSMENT — PAIN DESCRIPTION - DESCRIPTORS: DESCRIPTORS: DISCOMFORT;TINGLING

## 2025-03-08 NOTE — DISCHARGE SUMMARY
Discharge Summary    Emelyn Perez is a 34 y.o. year old female , who delivered at 37w0d gestation via repeat , Low Transverse in s/o gHTN and h/o classical CS, now PPD#3.    Admission Date: 3/5/2025  Discharge Date: 25       Discharge Diagnosis  , Low Transverse    Hospital Course  Delivery Date: 3/5/2025 1:09 PM  Delivery type: , Low Transverse   GA at delivery: 37w0d   Outcome: Living  Intrapartum complications: None  Feeding method: Breastfeeding Status: Yes     Procedures: none  Contraception at discharge: Partner vasectomy. We discussed pregnancy spacing of at least one year, abstaining from intercourse for 6wks, and the ability to become pregnant in the absence of regular menses. Pt verbalized understanding.    PEC w/ SF  - dx by severe range blood pressure requiring IV tx  - HELLP labs initially n/f elevated AST, repeat WNL  - Asymptomatic  - BPs now WNL on BP regimen: Nifedipine 60 mg BID + Labetalol 400 BID started this AM  - High mild range BP around 2000 on POD2  - s/p 20 mg lasix x1 dose on 3/5  - s/p mag  - The signs and symptoms of PEC were reviewed with the patient, including unrelenting headache, vision changes/blurred vision, and pain underneath the right breast.   - BP cuff for home for checking BP BID. Pt instructed to call primary OB if SBP > 160 or DBP > 110.   - I have reviewed with the patient the recommendation of admission until BP is well controlled and the risks of discharge prior to adequate BP control, including death, stroke, seizure, and readmission. I strongly recommended that the patient stay until POD4. She verbalizes understanding of risks. She verbalizes understanding of symptoms and BPs that warrant immediate medical attention, and desires discharge home today.      Acute on chronic blood loss anemia  -hgb 9.7-> -> 8.5-> 8.8 POD1  -Asymptomatic   -PO iron on discharge    Meeting all postpartum milestones. OK for DC today and follow  up as below.   - Follow-up in 2-5 days for BP check  - Follow-up in 1-2wks for incision check  - Follow-up in 4-6wks with primary OGYN    Pertinent Physical Exam At Time of Discharge    General: Examination reveals a well developed, well nourished, female, in no acute distress. She is alert and cooperative.  Lungs: symmetrical, non-labored breathing.  Cardiac: warm, well-perfused.  Abdomen: soft, non-tender.  Incision: Well approximated, without erythema/edema/drainage, steri-strips intact  Fundus: firm, below umbilicus, appropriately tender  Extremities: no redness or tenderness in the calves or thighs.  Neurological: alert, oriented, normal speech, no focal findings or movement disorder noted.     Vitals  /81   Pulse 100   Temp 36.6 °C (97.9 °F) (Temporal)   Resp 18   Wt 99.6 kg (219 lb 9.3 oz)   SpO2 95%   Breastfeeding Yes   BMI 34.39 kg/m²      Discharge Meds     Your medication list        START taking these medications        Instructions Last Dose Given Next Dose Due   acetaminophen 325 mg tablet  Commonly known as: Tylenol      Take 3 tablets (975 mg) by mouth every 6 hours if needed for mild pain (1 - 3) or moderate pain (4 - 6).       ferrous sulfate tablet  Commonly known as: Iron (ferrous sulfate)      Take 1 tablet (325 mg) by mouth once daily in the morning. Take before meals. Take on an empty stomach with vitamin C supplement or vitamin C containing juice       ibuprofen 600 mg tablet      Take 1 tablet (600 mg) by mouth every 6 hours if needed for mild pain (1 - 3) or moderate pain (4 - 6).       labetalol 200 mg tablet  Commonly known as: Normodyne      Take 2 tablets (400 mg) by mouth 2 times a day.       naloxone 4 mg/0.1 mL nasal spray  Commonly known as: Narcan      Administer 1 spray (4 mg) into affected nostril(s) if needed for opioid reversal or respiratory depression. May repeat every 2-3 minutes if needed, alternating nostrils, until medical assistance becomes available.        NIFEdipine ER 60 mg 24 hr tablet  Commonly known as: Adalat CC      Take 1 tablet (60 mg) by mouth every 12 hours. Do not crush, chew, or split.       oxyCODONE 5 mg immediate release tablet  Commonly known as: Roxicodone      Take 1 tablet (5 mg) by mouth every 4 hours if needed for severe pain (7 - 10).              CONTINUE taking these medications        Instructions Last Dose Given Next Dose Due   PRENATAL VITAMIN ORAL                  STOP taking these medications      aspirin 81 mg chewable tablet                  Where to Get Your Medications        These medications were sent to [place] [id number] [facility] - [place], [place] - [address]  [address], [place] [address]      Phone: [phone]   acetaminophen 325 mg tablet  ferrous sulfate tablet  ibuprofen 600 mg tablet  labetalol 200 mg tablet  naloxone 4 mg/0.1 mL nasal spray  NIFEdipine ER 60 mg 24 hr tablet  oxyCODONE 5 mg immediate release tablet          Complications Requiring Follow-Up  PEC w/ SF  Acute on chronic anemia    Test Results Pending At Discharge  Pending Labs       Order Current Status    Surgical Pathology Exam In process            Outpatient Follow-Up    I spent 25 minutes in the professional and overall care of this patient.      Milady Escoto, APRN-CNP

## 2025-03-08 NOTE — CARE PLAN
Problem: Hypertensive Disorder of Pregnancy (HDP)  Goal: Minimal s/sx of HDP and BP<160/110  Outcome: Adequate for Discharge     Problem: Postpartum  Goal: Experiences normal postpartum course  Outcome: Adequate for Discharge  Goal: Incisions, wounds, or drain sites healing without S/S of infection  Outcome: Adequate for Discharge  Goal: No s/sx infection  Outcome: Adequate for Discharge     The patient's goals for the shift include rest    The clinical goals for the shift include VSS, bleeding and swelling minimal, pain controlled with medications, breastfeeding.    Over the shift the patient made progress to goals above. Patient maintained VS WNL, bleeding and swelling minimal, and pain controlled with medications. Patient is motivated to continue learning about breastfeeding and latching infant.

## 2025-03-08 NOTE — LACTATION NOTE
"Lactation Consultant Note  Lactation Consultation  Reason for Consult: Follow-up assessment  Consultant Name: Sally Solomon RN, IBCLC    Maternal Information  Has mother  before?: Yes  How long did the mother previously breastfeed?: first child born at 26 weeks was in NICU for 4 months and exclusively pumped for two months  Previous Maternal Breastfeeding Challenges: Exclusive pump and bottle fed, Infant separation, Low milk supply  Infant to breast within first 2 hours of birth?: Yes    Maternal Assessment  Breast Assessment: Medium, Filling, Warm, Compressible  Nipple Assessment: Intact, Short, Erect with stimulation, Red/inflamed, Sore  Alterations in Nipple Condition: Stage I - pain or irritation with no skin break down  [place] Assessment: Normal    Infant Assessment  Infant Behavior: Deep sleep  Infant Assessment:  (deferred at this time)    Feeding Assessment  Nutrition Source: Breastmilk, Formula (per mother’s request), Formula (medically indicated)  Feeding Method: Nursing at the breast, Feeding expressed breastmilk, Paced bottle  Unable to assess infant feeding at this time: Other (Comment) (infant recently fed at 0900)    LATCH TOOL       Breast Pump  Pump: Hospital grade electric pump  Frequency: 8-10 times per day  Duration: Initiate phase  Breast Shield Size and Type: 21 mm  Units of Volume: mL per session    Other OB Lactation Tools  Lactation Tools: Comfort gels, Lanolin    Patient Follow-up  Inpatient Lactation Follow-up Needed : Yes  Outpatient Lactation Follow-up: Recommended    Other OB Lactation Documentation  Maternal Risk Factors: Preeclampsia,  delivery, Previous low supply  Infant Risk Factors: Early term birth 37-39 weeks, Poor or painful latch / restricted feedings    Recommendations/Summary  Mother states most recently infant has been \"biting\" when feeding at breast. She was breast feeding with and without SNS yesterday and overnight either bringing infant to " breast or formula feeding/feeding expressed breast milk. Upon assessment, mother's nipples bilaterally short, erect (more with stimulation) and reddened. Mother states latch has become uncomfortable to the point she almost bottle fed instead of breast fed infant with last feed. Suggested re-introducing nipple shield to see if infant would latch more comfortably with next feed. Encouraged mother to call with next feed. Mother states she hopes to feed infant once more before circumcision today and likely discharge later today. Brought mother comfort gels to use in between feeds/pump sessions. Mother continues to pump after feeding infant at breast.    Update:    Called back to room at 1035 to assist/assess feed. Upon assessment of mother, breasts firmer/filling. I assisted mother to bring infant to right breast in football hold with pillow support. Infant was showing feeding cues, appeared to be trying to latch and would latch briefly, which was painful to mother, then unlatch soon after. After several attempts, suggested to mother to move infant to cross-cradle hold at right breast. Infant readily latched to right breast in cross-cradle hold. Initial latch was a bit pinchy/painful then subsided a bit as I assisted to manually flange upper and lower lips a bit more. Infant remained latched well with areolar attachment, nose and chin touching breast, and lips flanged for about 10 minutes. Initially infant was sucking rhythmically with long jaw movement and audible swallows then after about 5 minutes or so required tactile stimulation to continue to suck/swallow. After about 10 minutes infant got sleepy, began to unlatch. Mother took infant off breast, attempted to burp, then brought infant to left breast in football hold with pillow support. Infant began to wake and show feeding cues, began rooting. I assisted to latch infant with c hold on breast and lining infant up nose to nipple. Infant, after a few attempts, able  to latch deeply to left breast. Infant latched well with areolar attachment, nose and chin touching breast, lips flanged, sucks with long jaw movement and audible swallows. After about 10 minutes infant unlatched and mother attempted to burp and then infant fell asleep. Infant then taken for circumcision.     Mother states comfort gels did help from earlier with pain but still had a bit of pain/discomfort on right breast with infant feeding. Left breast had no discomfort with feeding. Discussed option to feed infant expressed breast milk (or formula) and just pump to let nipples heal. Also discussed option to re-introduce nipple shield.

## 2025-03-08 NOTE — CARE PLAN
The patient's goals for the shift include rest    The clinical goals for the shift include maintain BP <160/110, be free from s/s PEC  BP remained <160/110, Patient with high MRBP- started labetalol 200 BID. No s/s HDP. Lochia scant, voiding, passing gas/stool. Pain well controlled with scheduled pain medication and rest. Breastfeeding and bonding with infant.     Problem: Hypertensive Disorder of Pregnancy (HDP)  Goal: Minimal s/sx of HDP and BP<160/110  Outcome: Progressing     Problem: Postpartum  Goal: Experiences normal postpartum course  Outcome: Progressing  Goal: Incisions, wounds, or drain sites healing without S/S of infection  Outcome: Progressing  Goal: No s/sx infection  Outcome: Progressing

## 2025-03-10 ENCOUNTER — TELEPHONE (OUTPATIENT)
Dept: OBSTETRICS AND GYNECOLOGY | Facility: CLINIC | Age: 35
End: 2025-03-10
Payer: COMMERCIAL

## 2025-03-10 NOTE — TELEPHONE ENCOUNTER
5 day postpartum patient called ob line with some concerns with her BP since being discharged home.    Patient is currently taking Labetalol 400 mg BID and Nifedipine 60 mg BID.  She admits to not keep a log as she thought she had to only worry about BP readings over 140's/90's.  At 3 am this morning she woke up super dizzy/lightheaded so she checked her BP and it was 106/63, she repeated 30 minutes later and it was 109/72 and then at 3 am it was 109/74.  At 8 am her BP was 134/84.  Patient has not taken any BP medication this morning as she is nervous that her BP will drop low again.    BP Log:    3-8 discharged home from [facility] late, no readings    3-9:  142/90 am  Took Labetalol 400 mg and Nifedipine 60  mg at 8:45 am  122/86 at 1 pm  Took Labetalol 400 mg and Nifedipine 60 devin at 8:45 pm  137/87 pm at 9:30 pm    3-10  106/63 at 2 am  109/72 at 2:30 am  109/74 at 3 am  134/84 at 8 am  Has not taken any BP medication yet.  Unsure how much to take at this point.    Please advise

## 2025-03-12 ENCOUNTER — APPOINTMENT (OUTPATIENT)
Dept: OBSTETRICS AND GYNECOLOGY | Facility: CLINIC | Age: 35
End: 2025-03-12
Payer: COMMERCIAL

## 2025-03-12 VITALS
WEIGHT: 189 LBS | HEIGHT: 67 IN | BODY MASS INDEX: 29.66 KG/M2 | SYSTOLIC BLOOD PRESSURE: 151 MMHG | HEART RATE: 104 BPM | OXYGEN SATURATION: 100 % | DIASTOLIC BLOOD PRESSURE: 95 MMHG

## 2025-03-12 PROBLEM — Z98.891 HISTORY OF CLASSICAL CESAREAN SECTION: Status: RESOLVED | Noted: 2024-08-19 | Resolved: 2025-03-12

## 2025-03-12 PROBLEM — Z3A.35 35 WEEKS GESTATION OF PREGNANCY (HHS-HCC): Status: RESOLVED | Noted: 2024-08-19 | Resolved: 2025-03-12

## 2025-03-12 PROBLEM — O14.00: Status: RESOLVED | Noted: 2025-02-11 | Resolved: 2025-03-12

## 2025-03-12 PROBLEM — O09.292: Status: RESOLVED | Noted: 2024-08-19 | Resolved: 2025-03-12

## 2025-03-12 PROBLEM — Z3A.37 37 WEEKS GESTATION OF PREGNANCY (HHS-HCC): Status: RESOLVED | Noted: 2025-03-05 | Resolved: 2025-03-12

## 2025-03-12 ASSESSMENT — EDINBURGH POSTNATAL DEPRESSION SCALE (EPDS)
TOTAL SCORE: 1
I HAVE LOOKED FORWARD WITH ENJOYMENT TO THINGS: AS MUCH AS I EVER DID
THE THOUGHT OF HARMING MYSELF HAS OCCURRED TO ME: NEVER
THINGS HAVE BEEN GETTING ON TOP OF ME: NO, MOST OF THE TIME I HAVE COPED QUITE WELL
I HAVE BEEN SO UNHAPPY THAT I HAVE BEEN CRYING: NO, NEVER
I HAVE BEEN SO UNHAPPY THAT I HAVE HAD DIFFICULTY SLEEPING: NOT AT ALL
I HAVE BEEN ABLE TO LAUGH AND SEE THE FUNNY SIDE OF THINGS: AS MUCH AS I ALWAYS COULD
I HAVE FELT SCARED OR PANICKY FOR NO GOOD REASON: NO, NOT AT ALL
I HAVE BEEN ANXIOUS OR WORRIED FOR NO GOOD REASON: NO, NOT AT ALL
I HAVE BLAMED MYSELF UNNECESSARILY WHEN THINGS WENT WRONG: NO, NEVER
I HAVE FELT SAD OR MISERABLE: NO, NOT AT ALL

## 2025-03-12 NOTE — PROGRESS NOTES
"Patient presents for a 1 week postpartum visit   Delivered by  on 3/05/2025    BRYANNA Harrison    Postpartum Visit    HPI:  Pt presents for her incision check  and blood pressure check  s/p repeat  section on 3/5/25 at 37 wks for h/o classical  section c/b sPEC.  She had a baby girl.  She is Breast and bottle feeding. Overall doing well.  Pain well controlled.  Normal bowel/bladder function.  Discharged home on procardia xl 60 mg BID and labetalol 400 mg BID.  Was 90s/60s yesterday AM so labetalol held.  Has been getting lower bp values at home than what we got here, feel like coming to office makes her nervous given everything she has been through.  Home bp values since stopping labetalol 130-140s/80s.      ROS:  Denies the following: Complains of the following:    - episiotomy site pain   - incisional drainage  - heavy bleeding  - irregular bleeding  - breast pain  - cracked nipples  - breastfeeding problems  - abdominal pain  - vaginal discharge  - shortness of breath  - chest pain  - back pain  - leg pain  - depression  - suicidal ideation  - nausea  - vomiting  - fever  - pain with urination  - tiredness or fatigue  - headache - incisional pain       O:  BP (!) 151/95 (BP Location: Right arm, Patient Position: Sitting, BP Cuff Size: Adult)   Pulse 104   Ht 1.702 m (5' 7\")   Wt 85.7 kg (189 lb)   SpO2 100%   Breastfeeding Yes   BMI 29.60 kg/m²     General Appearance   - consistent with stated age, well groomed and cooperative    Integumentary  - skin warm and dry without rash    Head and Neck  - normalocephalic and neck supple    Chest and Lung Exam  - normal breathing effort, no respiratory distress    Breast  - symmetry noted, no mass palpable, no skin change and no nipple discharge.    Abdomen  - soft, appropriately tender, incision c/d/I with steri strips in place, steri strips removed    A/P:   Pt is a 34 y.o.  who presents for incision check  and blood pressure " check .  Doing well overall postpartum.  Coping well with new baby at home.  [place]  Depression Scale Total: 1.  Breast and bottlefeeding. Baby doing well.  Given she is getting lower values at home off the labetalol, will continue procardia xl 60 mg BID and add labetalol back in if trending higher at home.  Incision healing well.  Plan RTC in 2 wks for close follow up.  Patient well aware of bps to call for (either low or high).    Radha bAad MD

## 2025-03-13 ENCOUNTER — TELEPHONE (OUTPATIENT)
Dept: OBSTETRICS AND GYNECOLOGY | Facility: CLINIC | Age: 35
End: 2025-03-13
Payer: COMMERCIAL

## 2025-03-13 NOTE — TELEPHONE ENCOUNTER
Patient called in wanting to speak to Merna regarding [facility] paperwork. Patient would like a call back.

## 2025-03-13 NOTE — TELEPHONE ENCOUNTER
Patient forgot to bring FMLA to appointment yesterday. Patient will bring Monday around lunch time to have us fill out and give back to her.

## 2025-03-14 DIAGNOSIS — O13.3 GESTATIONAL HYPERTENSION, THIRD TRIMESTER (HHS-HCC): ICD-10-CM

## 2025-03-21 DIAGNOSIS — O13.3 GESTATIONAL HYPERTENSION, THIRD TRIMESTER (HHS-HCC): ICD-10-CM

## 2025-03-26 ENCOUNTER — APPOINTMENT (OUTPATIENT)
Dept: OBSTETRICS AND GYNECOLOGY | Facility: CLINIC | Age: 35
End: 2025-03-26
Payer: COMMERCIAL

## 2025-03-26 VITALS
OXYGEN SATURATION: 98 % | DIASTOLIC BLOOD PRESSURE: 86 MMHG | HEIGHT: 67 IN | WEIGHT: 181 LBS | HEART RATE: 83 BPM | SYSTOLIC BLOOD PRESSURE: 121 MMHG | BODY MASS INDEX: 28.41 KG/M2

## 2025-03-26 PROCEDURE — 0503F POSTPARTUM CARE VISIT: CPT | Performed by: OBSTETRICS & GYNECOLOGY

## 2025-03-26 ASSESSMENT — EDINBURGH POSTNATAL DEPRESSION SCALE (EPDS)
I HAVE BEEN SO UNHAPPY THAT I HAVE HAD DIFFICULTY SLEEPING: NOT AT ALL
I HAVE BEEN ANXIOUS OR WORRIED FOR NO GOOD REASON: NO, NOT AT ALL
I HAVE LOOKED FORWARD WITH ENJOYMENT TO THINGS: AS MUCH AS I EVER DID
I HAVE BEEN SO UNHAPPY THAT I HAVE BEEN CRYING: NO, NEVER
I HAVE FELT SAD OR MISERABLE: NO, NOT AT ALL
THE THOUGHT OF HARMING MYSELF HAS OCCURRED TO ME: NEVER
I HAVE BEEN ABLE TO LAUGH AND SEE THE FUNNY SIDE OF THINGS: AS MUCH AS I ALWAYS COULD
I HAVE BLAMED MYSELF UNNECESSARILY WHEN THINGS WENT WRONG: NOT VERY OFTEN
TOTAL SCORE: 1
I HAVE FELT SCARED OR PANICKY FOR NO GOOD REASON: NO, NOT AT ALL
THINGS HAVE BEEN GETTING ON TOP OF ME: NO, I HAVE BEEN COPING AS WELL AS EVER

## 2025-03-26 NOTE — PROGRESS NOTES
"Patient presents for a 3 week postpartum visit   Delivered by  3/05/2025    Merna Calvillo SHAHIDA    Postpartum Visit    HPI:  Pt presents for her incision check  and blood pressure check  s/p repeat  section on 3/5/25 at 37 wks for h/o classical  section c/b sPEC.  She had a baby girl.  She is Breast and bottle feeding. Overall doing well.  Pain well controlled.  Normal bowel/bladder function.  Now off all bp meds as of 2 days ago, getting normal bps at home and bp normal here.  Bleeding increased again but has now decreased.     ROS:  Denies the following: Complains of the following:    - episiotomy site pain   - incisional drainage  - heavy bleeding  - irregular bleeding  - breast pain  - cracked nipples  - breastfeeding problems  - abdominal pain  - vaginal discharge  - shortness of breath  - chest pain  - back pain  - leg pain  - depression  - suicidal ideation  - nausea  - vomiting  - fever  - pain with urination  - tiredness or fatigue  - headache - incisional pain       O:  /86 (BP Location: Left arm, Patient Position: Sitting, BP Cuff Size: Adult)   Pulse 83   Ht 1.702 m (5' 7\")   Wt 82.1 kg (181 lb)   SpO2 98%   Breastfeeding Yes   BMI 28.35 kg/m²     General Appearance   - consistent with stated age, well groomed and cooperative    Integumentary  - skin warm and dry without rash    Abdomen  - soft, nontender, incision healing well    A/P:   Pt is a 34 y.o.  who presents for incision check  and blood pressure check .  Doing well overall postpartum.  Coping well with new baby at home.  [place]  Depression Scale Total: 1.  Breast and bottlefeeding. Baby doing well.  Now off bp medications and bps normal.  RTC for 6 wk postpartum visit or earlier with any concerns.  Will need postaprtum colpo.    Radha Abad MD  "

## 2025-03-28 DIAGNOSIS — O13.3 GESTATIONAL HYPERTENSION, THIRD TRIMESTER (HHS-HCC): ICD-10-CM

## 2025-04-04 DIAGNOSIS — O13.3 GESTATIONAL HYPERTENSION, THIRD TRIMESTER (HHS-HCC): ICD-10-CM

## 2025-04-11 DIAGNOSIS — O13.3 GESTATIONAL HYPERTENSION, THIRD TRIMESTER (HHS-HCC): ICD-10-CM

## 2025-04-18 ENCOUNTER — APPOINTMENT (OUTPATIENT)
Dept: OBSTETRICS AND GYNECOLOGY | Facility: CLINIC | Age: 35
End: 2025-04-18
Payer: COMMERCIAL

## 2025-04-18 VITALS
HEIGHT: 67 IN | SYSTOLIC BLOOD PRESSURE: 122 MMHG | BODY MASS INDEX: 27.62 KG/M2 | DIASTOLIC BLOOD PRESSURE: 86 MMHG | WEIGHT: 176 LBS | HEART RATE: 75 BPM | OXYGEN SATURATION: 100 %

## 2025-04-18 DIAGNOSIS — O13.3 GESTATIONAL HYPERTENSION, THIRD TRIMESTER (HHS-HCC): ICD-10-CM

## 2025-04-18 DIAGNOSIS — R87.610 ASCUS WITH POSITIVE HIGH RISK HPV CERVICAL: ICD-10-CM

## 2025-04-18 DIAGNOSIS — R87.810 ASCUS WITH POSITIVE HIGH RISK HPV CERVICAL: ICD-10-CM

## 2025-04-18 LAB — PREGNANCY TEST URINE, POC: NEGATIVE

## 2025-04-18 NOTE — PROGRESS NOTES
"Patient presents for a 6 week postpartum visit   Delivered  3/05/2025  Last PAP 2024 ASCUS HPV+  Colposcopy today     Merna Calvillo SHAHIDA    Postpartum Visit    HPI:  Pt presents for her 6  wk postpartum visit and colposcopy s/p repeat  section on 3/5/25 at 37 wks for h/o classical  section c/b sPEC.  She had a baby girl.  She is Breast and bottle feeding. Off all bp meds and feeling well.  Colpo for ASC-US, hpv pos pap.  PPBC plan  vasectomy.    ROS:  Denies the following: Complains of the following:    - episiotomy site pain   - incisional drainage  - heavy bleeding  - irregular bleeding  - breast pain  - cracked nipples  - breastfeeding problems  - abdominal pain  - vaginal discharge  - shortness of breath  - chest pain  - back pain  - leg pain  - depression  - suicidal ideation  - nausea  - vomiting  - fever  - pain with urination  - tiredness or fatigue  - headache -none       O:  /86 (BP Location: Left arm, Patient Position: Sitting, BP Cuff Size: Adult)   Pulse 75   Ht 1.702 m (5' 7\")   Wt 79.8 kg (176 lb)   SpO2 100%   Breastfeeding Yes   BMI 27.57 kg/m²     General Appearance   - consistent with stated age, well groomed and cooperative    Integumentary  - skin warm and dry without rash    Abdomen  - soft, nontender, incision well healed    A/P:   Pt is a 34 y.o.  who presents for 6 wk postpartum exam and colpo for ASC-US, hpv pos pap in pregnancy.  Doing well overall postpartum.  Coping well with new baby at home.   .  Breast and bottlefeeding. Baby doing well.  Off all bp meds and bps normal. PPBC plan  vasectomy, declines interval contraception.  RTC for annual exam or earlier as needed.     Radha Abad MD    "

## 2025-04-18 NOTE — PROGRESS NOTES
Patient ID: Emelyn Perez is a 34 y.o. female.    Colposcopy    Date/Time: 4/18/2025 2:57 PM    Performed by: Radha Abad MD  Authorized by: Radha Abad MD    Procedure location: cervix    Consent:     Patient questions answered: yes      Risks and benefits of the procedure and its alternatives discussed: yes      Consent obtained:  Written    Consent given by:  Patient  Indication:     Cervical indication(s): high-risk HPV positive and ASCUS    Pre-procedure:     Speculum was placed in the vagina: yes      Prep solution(s): acetic acid    Procedure:     Colposcopy with: cervical biopsy and endocervical curettage      Cervix visibility: fully visualized      SCJ visibility: fully visualized      Acetowhite lesion(s): cervix    Post-procedure:     Patient tolerance of procedure:  Patient tolerated the procedure well with no immediate complications    Uncomplicated colposcopy for ASC-US, hpv pos pap.  Colpo adequate, impression CIN1.  Cervical biopsy an ECC done. Will call patient with results.      Radha Abad MD

## 2025-04-25 DIAGNOSIS — O13.3 GESTATIONAL HYPERTENSION, THIRD TRIMESTER (HHS-HCC): ICD-10-CM

## 2025-05-02 DIAGNOSIS — O13.3 GESTATIONAL HYPERTENSION, THIRD TRIMESTER (HHS-HCC): ICD-10-CM

## 2025-05-09 DIAGNOSIS — O13.3 GESTATIONAL HYPERTENSION, THIRD TRIMESTER (HHS-HCC): ICD-10-CM

## 2025-05-16 DIAGNOSIS — O13.3 GESTATIONAL HYPERTENSION, THIRD TRIMESTER (HHS-HCC): ICD-10-CM

## 2025-05-23 DIAGNOSIS — O13.3 GESTATIONAL HYPERTENSION, THIRD TRIMESTER (HHS-HCC): ICD-10-CM

## 2025-05-30 DIAGNOSIS — O13.3 GESTATIONAL HYPERTENSION, THIRD TRIMESTER (HHS-HCC): ICD-10-CM

## 2025-06-06 DIAGNOSIS — O13.3 GESTATIONAL HYPERTENSION, THIRD TRIMESTER (HHS-HCC): ICD-10-CM

## 2025-06-13 DIAGNOSIS — O13.3 GESTATIONAL HYPERTENSION, THIRD TRIMESTER (HHS-HCC): ICD-10-CM

## 2025-06-20 DIAGNOSIS — O13.3 GESTATIONAL HYPERTENSION, THIRD TRIMESTER (HHS-HCC): ICD-10-CM

## (undated) DEVICE — SUTURE, MONOCRYL, 2-0, 36 IN, CTX, VIOLET

## (undated) DEVICE — TOWEL PACK, STERILE, 4/PACK, BLUE

## (undated) DEVICE — SUTURE, VICRYL, 3-0, 27 IN, SH, VIOLET

## (undated) DEVICE — SUTURE, VICRYL 0, 36 IN, CT-1, VIOLET

## (undated) DEVICE — SUTURE, PDS II, 0 36 IN, CT-1, VIOLET

## (undated) DEVICE — SUTURE, MONOCRYL, 2-0, 36 IN, CT-1, UNDYED

## (undated) DEVICE — SUTURE, MONOCRYL, 0, 36 IN, CTX, VIOLET

## (undated) DEVICE — SUTURE, VICRYL, 3-0, 27 IN, CT-1, UNDYED

## (undated) DEVICE — DRAPE PACK, CESAREAN SECTION, CUSTOM, UHC

## (undated) DEVICE — SUTURE, MONOCRYL PLUS, 4-0, PS-2 UD 27IN

## (undated) DEVICE — SUTURE, VICRYL, 0, 36 IN, CT, UNDYED